# Patient Record
Sex: MALE | Race: WHITE | NOT HISPANIC OR LATINO | Employment: OTHER | URBAN - METROPOLITAN AREA
[De-identification: names, ages, dates, MRNs, and addresses within clinical notes are randomized per-mention and may not be internally consistent; named-entity substitution may affect disease eponyms.]

---

## 2017-01-09 ENCOUNTER — OFFICE VISIT (OUTPATIENT)
Dept: FAMILY MEDICINE | Facility: CLINIC | Age: 62
End: 2017-01-09
Payer: COMMERCIAL

## 2017-01-09 VITALS
WEIGHT: 152 LBS | HEART RATE: 71 BPM | TEMPERATURE: 96.6 F | HEIGHT: 69 IN | DIASTOLIC BLOOD PRESSURE: 85 MMHG | BODY MASS INDEX: 22.51 KG/M2 | SYSTOLIC BLOOD PRESSURE: 145 MMHG | OXYGEN SATURATION: 95 %

## 2017-01-09 DIAGNOSIS — R35.1 NOCTURIA: ICD-10-CM

## 2017-01-09 DIAGNOSIS — R73.9 ELEVATED BLOOD SUGAR: ICD-10-CM

## 2017-01-09 DIAGNOSIS — E06.3 CHRONIC LYMPHOCYTIC THYROIDITIS: ICD-10-CM

## 2017-01-09 DIAGNOSIS — Z13.6 CARDIOVASCULAR SCREENING; LDL GOAL LESS THAN 160: ICD-10-CM

## 2017-01-09 DIAGNOSIS — E06.3 HYPOTHYROIDISM DUE TO HASHIMOTO'S THYROIDITIS: ICD-10-CM

## 2017-01-09 DIAGNOSIS — Z11.59 NEED FOR HEPATITIS C SCREENING TEST: ICD-10-CM

## 2017-01-09 DIAGNOSIS — M72.0 DUPUYTREN'S CONTRACTURE OF BOTH HANDS: ICD-10-CM

## 2017-01-09 DIAGNOSIS — E06.3 HYPOTHYROIDISM DUE TO HASHIMOTO'S THYROIDITIS: Primary | ICD-10-CM

## 2017-01-09 DIAGNOSIS — Z00.00 ROUTINE HISTORY AND PHYSICAL EXAMINATION OF ADULT: Primary | ICD-10-CM

## 2017-01-09 LAB
ALBUMIN SERPL-MCNC: 3.9 G/DL (ref 3.4–5)
ALBUMIN UR-MCNC: NEGATIVE MG/DL
ANION GAP SERPL CALCULATED.3IONS-SCNC: 9 MMOL/L (ref 3–14)
APPEARANCE UR: CLEAR
BILIRUB UR QL STRIP: NEGATIVE
BUN SERPL-MCNC: 17 MG/DL (ref 7–30)
CALCIUM SERPL-MCNC: 9.4 MG/DL (ref 8.5–10.1)
CHLORIDE SERPL-SCNC: 103 MMOL/L (ref 94–109)
CHOLEST SERPL-MCNC: 207 MG/DL
CO2 SERPL-SCNC: 29 MMOL/L (ref 20–32)
COLOR UR AUTO: YELLOW
CREAT SERPL-MCNC: 1.07 MG/DL (ref 0.66–1.25)
CREAT UR-MCNC: 186 MG/DL
ERYTHROCYTE [DISTWIDTH] IN BLOOD BY AUTOMATED COUNT: 13.5 % (ref 10–15)
GFR SERPL CREATININE-BSD FRML MDRD: 70 ML/MIN/1.7M2
GLUCOSE SERPL-MCNC: 137 MG/DL (ref 70–99)
GLUCOSE UR STRIP-MCNC: NEGATIVE MG/DL
HBA1C MFR BLD: 5.9 % (ref 4.3–6)
HCT VFR BLD AUTO: 48 % (ref 40–53)
HCV AB SERPL QL IA: NORMAL
HDLC SERPL-MCNC: 63 MG/DL
HGB BLD-MCNC: 16 G/DL (ref 13.3–17.7)
HGB UR QL STRIP: ABNORMAL
KETONES UR STRIP-MCNC: NEGATIVE MG/DL
LDLC SERPL CALC-MCNC: 128 MG/DL
LEUKOCYTE ESTERASE UR QL STRIP: NEGATIVE
MCH RBC QN AUTO: 31.5 PG (ref 26.5–33)
MCHC RBC AUTO-ENTMCNC: 33.3 G/DL (ref 31.5–36.5)
MCV RBC AUTO: 95 FL (ref 78–100)
MICROALBUMIN UR-MCNC: 7 MG/L
MICROALBUMIN/CREAT UR: 3.51 MG/G CR (ref 0–17)
NITRATE UR QL: NEGATIVE
NONHDLC SERPL-MCNC: 144 MG/DL
PH UR STRIP: 6.5 PH (ref 5–7)
PHOSPHATE SERPL-MCNC: 2.7 MG/DL (ref 2.5–4.5)
PLATELET # BLD AUTO: 220 10E9/L (ref 150–450)
POTASSIUM SERPL-SCNC: 4.3 MMOL/L (ref 3.4–5.3)
PSA SERPL-ACNC: 1.12 UG/L (ref 0–4)
RBC # BLD AUTO: 5.08 10E12/L (ref 4.4–5.9)
RBC #/AREA URNS AUTO: NORMAL /HPF (ref 0–2)
SODIUM SERPL-SCNC: 141 MMOL/L (ref 133–144)
SP GR UR STRIP: 1.01 (ref 1–1.03)
T4 FREE SERPL-MCNC: 1.18 NG/DL (ref 0.76–1.46)
TRIGL SERPL-MCNC: 78 MG/DL
TSH SERPL DL<=0.005 MIU/L-ACNC: 6.03 MU/L (ref 0.4–4)
URN SPEC COLLECT METH UR: ABNORMAL
UROBILINOGEN UR STRIP-ACNC: 0.2 EU/DL (ref 0.2–1)
WBC # BLD AUTO: 8.7 10E9/L (ref 4–11)
WBC #/AREA URNS AUTO: NORMAL /HPF (ref 0–2)

## 2017-01-09 PROCEDURE — 84439 ASSAY OF FREE THYROXINE: CPT | Performed by: FAMILY MEDICINE

## 2017-01-09 PROCEDURE — 99000 SPECIMEN HANDLING OFFICE-LAB: CPT | Performed by: FAMILY MEDICINE

## 2017-01-09 PROCEDURE — 84443 ASSAY THYROID STIM HORMONE: CPT | Performed by: FAMILY MEDICINE

## 2017-01-09 PROCEDURE — 81001 URINALYSIS AUTO W/SCOPE: CPT | Performed by: FAMILY MEDICINE

## 2017-01-09 PROCEDURE — 80069 RENAL FUNCTION PANEL: CPT | Performed by: FAMILY MEDICINE

## 2017-01-09 PROCEDURE — 82043 UR ALBUMIN QUANTITATIVE: CPT | Performed by: FAMILY MEDICINE

## 2017-01-09 PROCEDURE — 80061 LIPID PANEL: CPT | Performed by: FAMILY MEDICINE

## 2017-01-09 PROCEDURE — 83036 HEMOGLOBIN GLYCOSYLATED A1C: CPT | Performed by: FAMILY MEDICINE

## 2017-01-09 PROCEDURE — 85027 COMPLETE CBC AUTOMATED: CPT | Performed by: FAMILY MEDICINE

## 2017-01-09 PROCEDURE — 36415 COLL VENOUS BLD VENIPUNCTURE: CPT | Performed by: FAMILY MEDICINE

## 2017-01-09 PROCEDURE — G0103 PSA SCREENING: HCPCS | Performed by: FAMILY MEDICINE

## 2017-01-09 PROCEDURE — 86803 HEPATITIS C AB TEST: CPT | Mod: 90 | Performed by: FAMILY MEDICINE

## 2017-01-09 PROCEDURE — 99396 PREV VISIT EST AGE 40-64: CPT | Performed by: FAMILY MEDICINE

## 2017-01-09 RX ORDER — LEVOTHYROXINE SODIUM 88 UG/1
88 TABLET ORAL DAILY
Qty: 90 TABLET | Refills: 3 | Status: SHIPPED | OUTPATIENT
Start: 2017-01-09 | End: 2018-01-21

## 2017-01-09 ASSESSMENT — PAIN SCALES - GENERAL: PAINLEVEL: NO PAIN (0)

## 2017-01-09 NOTE — MR AVS SNAPSHOT
After Visit Summary   1/9/2017    Robert Carolina    MRN: [id number]           Patient Information     Date Of Birth          1955        Visit Information        Provider Department      1/9/2017 8:20 AM Renee Byrd MD [facility]        Today's Diagnoses     Routine history and physical examination of adult    -  1     Chronic lymphocytic thyroiditis         Hypothyroidism due to Hashimoto's thyroiditis         Need for hepatitis C screening test         CARDIOVASCULAR SCREENING; LDL GOAL LESS THAN 160         Elevated blood sugar         Nocturia           Care Instructions      Preventive Health Recommendations  Male Ages 50 - 64    Yearly exam:             See your health care provider every year in order to  o   Review health changes.   o   Discuss preventive care.    o   Review your medicines if your doctor has prescribed any.     Have a cholesterol test every 5 years, or more frequently if you are at risk for high cholesterol/heart disease.     Have a diabetes test (fasting glucose) every three years. If you are at risk for diabetes, you should have this test more often.     Have a colonoscopy at age 50, or have a yearly FIT test (stool test). These exams will check for colon cancer.      Talk with your health care provider about whether or not a prostate cancer screening test (PSA) is right for you.    You should be tested each year for STDs (sexually transmitted diseases), if you re at risk.     Shots: Get a flu shot each year. Get a tetanus shot every 10 years.     Nutrition:    Eat at least 5 servings of fruits and vegetables daily.     Eat whole-grain bread, whole-wheat pasta and brown rice instead of white grains and rice.     Talk to your provider about Calcium and Vitamin D.     Lifestyle    Exercise for at least 150 minutes a week (30 minutes a day, 5 days a week). This will help you control your weight and prevent disease.     Limit alcohol to one  drink per day.     No smoking.     Wear sunscreen to prevent skin cancer.     See your dentist every six months for an exam and cleaning.     See your eye doctor every 1 to 2 years.      How to contact your care team: [phone] Pharmacy [phone]   EDENILSON MOTA MD KATYA GEORGIEV, PA-C CHRIS JONES, PA-C NAM HO, MD JONATHAN BATES, MD ARVIN VOCAL, MD    Clinic hours M-Th 7am-7pm Fri 7am-5pm.   Urgent care M-F 11am-9pm  Sat/Sun 9am-5pm.   Pharmacy   Mon-Th:  8:00am-8pm   Fri:  8:00am-6:00pm  Sat/Sun  8:00am-5:00 pm       Hypothyroidism       You have hypothyroidism. This means your thyroid gland is not making enough thyroid hormone. This hormone is vital to body growth and metabolism. If you don t make enough, many body processes slow down. This can cause symptoms throughout the body. Hypothyroidism can range from mild to severe. The most severe form is called myxedema.  There are a number of causes of hypothyroidism. A common cause is Hashimoto s disease. This disease causes the body s own immune system to attack the thyroid gland. When you have certain treatments, such as surgery to remove the thyroid gland, this can also cause hypothyroidism.  Symptoms of hypothyroidism can include:    Fatigue    Trouble concentrating or thinking clearly; forgetfulness    Dry skin    Hair loss    Weight gain    Low tolerance to cold    Constipation    Depression    Personality changes    Tingling or prickling of the hands or feet    Heavy, absent, or irregular periods (women only)  Older adults may sometimes have other symptoms. These can include:    Muscle aches and weakness    Confusion    Incontinence (unable to control urine or stool)    Trouble moving around    Falling  Treatment for hypothyroidism involves taking thyroid hormone pills daily. These pills replace the hormone your thyroid doesn t make. You will likely need to take a daily pill for the rest of your life. Tips for taking this  medicine are given below.  Home care  Tips for taking your medicine    Take your thyroid hormone pills as prescribed by your healthcare provider. This is most often 1 pill a day on an empty stomach. Use a pillbox labeled with the days of the week. This will help you remember to take your pill each day.    Don t take products that contain iron and calcium or antacids within 4 hours of taking your thyroid hormone pills.    Don t take other medicines with your thyroid hormone pill without checking with your provider first.    Tell your provider if you have any side effects from your medicines that bother you.    Never change the dosage or stop taking your thyroid pills without talking to your provider first.  General care    Always talk with your provider before trying other medicines or treatments for your thyroid problem.    If you see other healthcare providers, be sure to let them know about your thyroid problem.  Follow-up care  See your healthcare provider for checkups as advised. You may need regular tests to check the level of thyroid hormone in your blood.  When to seek medical advice  Call your healthcare provider right away if any of these occur:    New symptoms develop    Symptoms return, continue, or worsen even after treatment    Extreme fatigue    Puffy hands, face, or feet    Fast or irregular heartbeat    Confusion  Call 911  Call 911 right away if any of these occur:    Fainting    Chest pain    Shortness of breath or trouble breathing    [id number] [employer]. [address] [place] [address]. All rights reserved. This information is not intended as a substitute for professional medical care. Always follow your healthcare professional's instructions.              Follow-ups after your visit        Your next 10 appointments already scheduled     Aug 22, 2017  1:30 PM   [facility]     "[address] [facility] [address]   [phone]            Dec 15, 2017 10:45 AM   Return Visit with Alisha Blackwood MD   [facility]    [address] [place] [address] [phone]              Who to contact     If you have questions or need follow up information about today's clinic visit or your schedule please contact [facility] directly at [phone].  Normal or non-critical lab and imaging results will be communicated to you by Wordseyehart, letter or phone within 4 business days after the clinic has received the results. If you do not hear from us within 7 days, please contact the clinic through Audiamt or phone. If you have a critical or abnormal lab result, we will notify you by phone as soon as possible.  Submit refill requests through [employer] or call your pharmacy and they will forward the refill request to us. Please allow 3 business days for your refill to be completed.          Additional Information About Your Visit        MyChart Information     [employer] gives you secure access to your electronic health record. If you see a primary care provider, you can also send messages to your care team and make appointments. If you have questions, please call your primary care clinic.  If you do not have a primary care provider, please call [phone] and they will assist you.        Care EveryWhere ID     This is your Care EveryWhere ID. This could be used by other organizations to access your [place] medical records  [id number]        Your Vitals Were     Pulse Temperature Height BMI (Body Mass Index) Pulse Oximetry       71 96.6  F (35.9  C) (Oral) 5' 9.25\" (1.759 m) 22.28 kg/m2 95%        Blood Pressure from Last 3 Encounters:   01/09/17 145/85   01/11/16 146/82   11/23/15 135/86    Weight from Last 3 Encounters:   01/09/17 152 lb (68.947 kg)   11/22/16 150 lb (68.04 kg)   05/24/16 155 lb (70.308 kg)    "           We Performed the Following     Albumin Random Urine Quantitative     CBC with platelets     Hemoglobin A1c     Hepatitis C Screen Reflex to HCV RNA Quant and Genotype     Lipid panel reflex to direct LDL     PSA, screen     Renal panel     TSH with free T4 reflex     UA reflex to Microscopic and Culture          Where to get your medicines      These medications were sent to [id number]  [place] - [address] AT [facility] [address]  [address] [place] [address]     Phone:  [phone]    - levothyroxine 88 MCG tablet       Primary Care Provider Office Phone # Fax #    Renee Neeta Byrd [phone]       [facility] [address]  [facility] [place] [address]        Thank you!     Thank you for choosing [facility]  for your care. Our goal is always to provide you with excellent care. Hearing back from our patients is one way we can continue to improve our services. Please take a few minutes to complete the written survey that you may receive in the mail after your visit with us. Thank you!             Your Updated Medication List - Protect others around you: Learn how to safely use, store and throw away your medicines at www.disposemymeds.org.          This list is accurate as of: 1/9/17  8:50 AM.  Always use your most recent med list.                   Brand Name Dispense Instructions for use    ALAVERT PO      takes about 1-2 times per week as needed       BABY ASPIRIN PO          COMPOUND - PHARMACY TO MIX COMPOUNDED MEDICATION    CMPD RX     Spray in nostril 2 times daily Gent/Dex/Itra Nasal Irrigation       levothyroxine 88 MCG tablet    SYNTHROID/LEVOTHROID    90 tablet    Take 1 tablet (88 mcg) by mouth daily       OCUVITE PO      once daily       PEPTO-BISMOL PO      Take  by mouth as needed.       TYLENOL 325 MG tablet   Generic drug:  acetaminophen      as needed       ZANTAC 150 MG tablet   Generic  drug:  ranitidine      Take  by mouth. As neded.

## 2017-01-09 NOTE — NURSING NOTE
"Chief Complaint   Patient presents with     Physical     Fasting       Initial /85 mmHg  Pulse 71  Temp(Src) 96.6  F (35.9  C) (Oral)  Ht 5' 9.25\" (1.759 m)  Wt 152 lb (68.947 kg)  BMI 22.28 kg/m2  SpO2 95% Estimated body mass index is 22.28 kg/(m^2) as calculated from the following:    Height as of this encounter: 5' 9.25\" (1.759 m).    Weight as of this encounter: 152 lb (68.947 kg).  BP completed using cuff size: hermila Solo CMA    "

## 2017-01-09 NOTE — PROGRESS NOTES
SUBJECTIVE:     CC: Robert Carolina is an 61 year old male who presents for preventative health visit.     Healthy Habits:    Do you get at least three servings of calcium containing foods daily (dairy, green leafy vegetables, etc.)? yes    Amount of exercise or daily activities, outside of work: Active at part-time work    Problems taking medications regularly No    Medication side effects: No    Have you had an eye exam in the past two years? yes    Do you see a dentist twice per year? yes    Do you have sleep apnea, excessive snoring or daytime drowsiness? yes currently has sleep apnea        Hypothyroidism Follow-up      Since last visit, patient describes the following symptoms: Weight stable, no hair loss, no skin changes, no constipation, no loose stools       Today's PHQ-2 Score:   PHQ-2 ( 1999 [employer]) 1/9/2017 1/11/2016   Q1: Little interest or pleasure in doing things 0 0   Q2: Feeling down, depressed or hopeless 0 0   PHQ-2 Score 0 0       Abuse: Current or Past(Physical, Sexual or Emotional)- No  Do you feel safe in your environment - Yes    Social History   Substance Use Topics     Smoking status: Never Smoker      Smokeless tobacco: Never Used     Alcohol Use: Yes      Comment: 1-2 times a month     The patient does not drink >3 drinks per day nor >7 drinks per week.    Last PSA:   PSA   Date Value Ref Range Status   11/02/2014 1.36 0 - 4 ug/L Final       Recent Labs   Lab Test  08/21/15   0910  11/02/14   0949   CHOL  179  175   HDL  50  59   LDL  115  100   TRIG  70  81   CHOLHDLRATIO  3.6  3.0       Reviewed orders with patient. Reviewed health maintenance and updated orders accordingly - Yes    All Histories reviewed and updated in Epic.      ROS:  C: NEGATIVE for fever, chills, change in weight  I: NEGATIVE for worrisome rashes, moles or lesions  E: NEGATIVE for vision changes or irritation  ENT: NEGATIVE for ear, mouth and throat problems  R: NEGATIVE for significant cough or SOB  CV: NEGATIVE  for chest pain, palpitations or peripheral edema  GI: NEGATIVE for nausea, abdominal pain, heartburn, or change in bowel habits   male: negative for dysuria, hematuria, decreased urinary stream, erectile dysfunction, urethral discharge   male: decreased urinary stream and nocturia x 2  M: NEGATIVE for significant arthralgias or myalgia  N: NEGATIVE for weakness, dizziness or paresthesias  P: NEGATIVE for changes in mood or affect    Problem list, Medication list, Allergies, and Medical/Social/Surgical histories reviewed in [facility] and updated as appropriate.  Labs reviewed in EPIC  BP Readings from Last 3 Encounters:   01/09/17 145/85   01/11/16 146/82   11/23/15 135/86    Wt Readings from Last 3 Encounters:   01/09/17 152 lb (68.947 kg)   11/22/16 150 lb (68.04 kg)   05/24/16 155 lb (70.308 kg)                  Patient Active Problem List   Diagnosis     CARDIOVASCULAR SCREENING; LDL GOAL LESS THAN 160     Nasal polyposis     Chronic sinusitis     Advanced directives, counseling/discussion     SCOTT (obstructive sleep apnea)-Mild (AHI 6)     Hashimoto's thyroiditis     Nevus of multiple sites of trunk     Hypothyroidism due to Hashimoto's thyroiditis     Elevated blood sugar     Nocturia     Past Surgical History   Procedure Laterality Date     Arthroscopy knee rt/lt       LT - Meniscus injury, Dr. Miguel LITTLEJOHN nonspecific procedure       Stapled Stomach - due to ulcer     C oral surgery procedure       [place] teeth     Optical tracking system endoscopic sinus surgery  7/21/2011     Procedure:OPTICAL TRACKING SYSTEM ENDOSCOPIC SINUS SURGERY; image guided endoscopic septoplasty, bilateral ethmoidectomy, bilateral maxillary antrostomy, polypectomy,bilateral sphenoid sinusotomies, bilateral frontal sinusotomies; Surgeon:PAT HOLDER; Location: OR     Sinus surgery  2009       Social History   Substance Use Topics     Smoking status: Never Smoker      Smokeless tobacco: Never Used     Alcohol Use: Yes       "Comment: 1-2 times a month     Family History   Problem Relation Age of Onset     DIABETES Mother          Current Outpatient Prescriptions   Medication Sig Dispense Refill     levothyroxine (SYNTHROID/LEVOTHROID) 88 MCG tablet Take 1 tablet (88 mcg) by mouth daily 90 tablet 3     COMPOUND (CMPD RX) - PHARMACY TO MIX COMPOUNDED MEDICATION Spray in nostril 2 times daily Gent/Dex/Itra Nasal Irrigation       BABY ASPIRIN PO        Bismuth Subsalicylate (PEPTO-BISMOL PO) Take  by mouth as needed.       ZANTAC 150 MG PO TABS Take  by mouth. As neded.        ALAVERT OR takes about 1-2 times per week as needed       OCUVITE OR once daily       TYLENOL 325 MG PO TABS as needed       [DISCONTINUED] levothyroxine (SYNTHROID, LEVOTHROID) 88 MCG tablet Take 1 tablet (88 mcg) by mouth daily 90 tablet 3     Allergies   Allergen Reactions     Cefuroxime      Other reaction(s): Stomach Upset     Ceftin GI Disturbance     Oxycodone-Acetaminophen Rash     Recent Labs   Lab Test  01/11/16   1449  08/21/15   0910  11/02/14   0949   11/01/12   1716  06/14/09   1004   A1C  6.3*   --    --    --    --    --    LDL   --   115  100   --    --   111   HDL   --   50  59   --    --   41   TRIG   --   70  81   --    --   58   ALT   --    --    --    --   32   --    CR  0.92   --    --    --   0.79   --    GFRESTIMATED  83   --    --    --   >90   --    GFRESTBLACK  >90   GFR Calc     --    --    --   >90   --    POTASSIUM  4.2   --    --    --   4.0   --    TSH  3.33   --   3.09   < >  18.50*   --     < > = values in this interval not displayed.      OBJECTIVE:     /85 mmHg  Pulse 71  Temp(Src) 96.6  F (35.9  C) (Oral)  Ht 5' 9.25\" (1.759 m)  Wt 152 lb (68.947 kg)  BMI 22.28 kg/m2  SpO2 95%  EXAM:  GENERAL APPEARANCE: healthy, alert and no distress  EYES: Eyes grossly normal to inspection, PERRL and conjunctivae and sclerae normal  HENT: ear canals and TM's normal, nose and mouth without ulcers or lesions, oropharynx " clear and oral mucous membranes moist  NECK: no adenopathy, no asymmetry, masses, or scars and thyroid normal to palpation  RESP: lungs clear to auscultation - no rales, rhonchi or wheezes  CV: regular rates and rhythm, normal S1 S2, no S3 or S4, no murmur, click or rub, no peripheral edema and peripheral pulses strong  ABDOMEN: soft, nontender, no hepatosplenomegaly, no masses and bowel sounds normal  MS: no musculoskeletal defects are noted and gait is age appropriate without ataxia, heberden's nodes both hands with dupuytren's contractions with nodules both hands middle finger flexor tendons.  SKIN: no suspicious lesions or rashes  NEURO: Normal strength and tone, sensory exam grossly normal, mentation intact and speech normal  PSYCH: mentation appears normal and affect normal/bright     ASSESSMENT/PLAN:         ICD-10-CM    1. Routine history and physical examination of adult Z00.00 Chronic stable problems   2. Chronic lymphocytic thyroiditis E06.3 levothyroxine (SYNTHROID/LEVOTHROID) 88 MCG tablet   3. Hypothyroidism due to Hashimoto's thyroiditis E03.8 TSH with free T4 reflex    E06.3 CBC with platelets   4. Need for hepatitis C screening test Z11.59 Hepatitis C Screen Reflex to HCV RNA Quant and Genotype   5. CARDIOVASCULAR SCREENING; LDL GOAL LESS THAN 160 Z13.6 Lipid panel reflex to direct LDL   6. Elevated blood sugar R73.9 Hemoglobin A1c   7. Nocturia- referral to urology if symptoms worsen or treatment desired.  R35.1 Renal panel     UA reflex to Microscopic and Culture     Albumin Random Urine Quantitative     PSA, screen   8. Dupuytren's contracture of both hands M72.0 Stable and able to function without problems at this time.        COUNSELING:  Reviewed preventive health counseling, as reflected in patient instructions       Regular exercise       Healthy diet/nutrition       Vision screening       Hearing screening    BP Screening:   Last 3 BP Readings:    BP Readings from Last 3 Encounters:  "  01/09/17 145/85   01/11/16 146/82   11/23/15 135/86       The following was recommended to the patient:  Re-screen BP within a year and recommended lifestyle modifications       reports that he has never smoked. He has never used smokeless tobacco.    Estimated body mass index is 22.28 kg/(m^2) as calculated from the following:    Height as of this encounter: 5' 9.25\" (1.759 m).    Weight as of this encounter: 152 lb (68.947 kg).       Counseling Resources:  ATP IV Guidelines  Pooled Cohorts Equation Calculator  FRAX Risk Assessment  ICSI Preventive Guidelines  Dietary Guidelines for Americans, 2010  USDA's MyPlate  ASA Prophylaxis  Lung CA Screening    Renee Byrd MD  [facility]  "

## 2017-01-09 NOTE — Clinical Note
[facility]  [address] [place] [address]  [phone]      January 10, 2017      Robert Carolina  [address] [place] [address]              Dear Robert,      Your test results are attached. I am happy to let you know that they are stable and your medications can stay the same.    The blood sugar is normal and you do not have diabetes. The kidneys are healthy. The test for prostate cancer was normal and shows low risk.     The TSH is a little high but the Free T4 is normal. This is a borderline result and should be rechecked in 3 months. I will put in a future lab order for the thyroid and you can make a lab only appointment for the test.     Please call me if you have any questions about these test results or about your care.      Sincerely,      Renee Byrd MD      Results for orders placed or performed in visit on 01/09/17   Hepatitis C Screen Reflex to HCV RNA Quant and Genotype   Result Value Ref Range    Hepatitis C Antibody  NR     Nonreactive   Assay performance characteristics have not been established for newborns,   infants, and children     Renal panel   Result Value Ref Range    Sodium 141 133 - 144 mmol/L    Potassium 4.3 3.4 - 5.3 mmol/L    Chloride 103 94 - 109 mmol/L    Carbon Dioxide 29 20 - 32 mmol/L    Anion Gap 9 3 - 14 mmol/L    Glucose 137 (H) 70 - 99 mg/dL    Urea Nitrogen 17 7 - 30 mg/dL    Creatinine 1.07 0.66 - 1.25 mg/dL    GFR Estimate 70 >60 mL/min/1.7m2    GFR Estimate If Black 85 >60 mL/min/1.7m2    Calcium 9.4 8.5 - 10.1 mg/dL    Phosphorus 2.7 2.5 - 4.5 mg/dL    Albumin 3.9 3.4 - 5.0 g/dL   TSH with free T4 reflex   Result Value Ref Range    TSH 6.03 (H) 0.40 - 4.00 mU/L   Lipid panel reflex to direct LDL   Result Value Ref Range    Cholesterol 207 (H) <200 mg/dL    Triglycerides 78 <150 mg/dL    HDL Cholesterol 63 >39 mg/dL    LDL Cholesterol Calculated 128 (H) <100 mg/dL    Non HDL Cholesterol 144 (H) <130 mg/dL   Hemoglobin  A1c   Result Value Ref Range    Hemoglobin A1C 5.9 4.3 - 6.0 %   UA reflex to Microscopic and Culture   Result Value Ref Range    Color Urine Yellow     Appearance Urine Clear     Glucose Urine Negative NEG mg/dL    Bilirubin Urine Negative NEG    Ketones Urine Negative NEG mg/dL    Specific Gravity Urine 1.010 1.003 - 1.035    Blood Urine Trace (A) NEG    pH Urine 6.5 5.0 - 7.0 pH    Protein Albumin Urine Negative NEG mg/dL    Urobilinogen Urine 0.2 0.2 - 1.0 EU/dL    Nitrite Urine Negative NEG    Leukocyte Esterase Urine Negative NEG    Source Midstream Urine    Albumin Random Urine Quantitative   Result Value Ref Range    Creatinine Urine 186 mg/dL    Albumin Urine mg/L 7 mg/L    Albumin Urine mg/g Cr 3.51 0 - 17 mg/g Cr   CBC with platelets   Result Value Ref Range    WBC 8.7 4.0 - 11.0 10e9/L    RBC Count 5.08 4.4 - 5.9 10e12/L    Hemoglobin 16.0 13.3 - 17.7 g/dL    Hematocrit 48.0 40.0 - 53.0 %    MCV 95 78 - 100 fl    MCH 31.5 26.5 - 33.0 pg    MCHC 33.3 31.5 - 36.5 g/dL    RDW 13.5 10.0 - 15.0 %    Platelet Count 220 150 - 450 10e9/L   PSA, screen   Result Value Ref Range    PSA 1.12 0 - 4 ug/L   Urine Microscopic   Result Value Ref Range    WBC Urine O - 2 0 - 2 /HPF    RBC Urine O - 2 0 - 2 /HPF   T4 free   Result Value Ref Range    T4 Free 1.18 0.76 - 1.46 ng/dL

## 2017-01-09 NOTE — PATIENT INSTRUCTIONS
Preventive Health Recommendations  Male Ages 50 - 64    Yearly exam:             See your health care provider every year in order to  o   Review health changes.   o   Discuss preventive care.    o   Review your medicines if your doctor has prescribed any.     Have a cholesterol test every 5 years, or more frequently if you are at risk for high cholesterol/heart disease.     Have a diabetes test (fasting glucose) every three years. If you are at risk for diabetes, you should have this test more often.     Have a colonoscopy at age 50, or have a yearly FIT test (stool test). These exams will check for colon cancer.      Talk with your health care provider about whether or not a prostate cancer screening test (PSA) is right for you.    You should be tested each year for STDs (sexually transmitted diseases), if you re at risk.     Shots: Get a flu shot each year. Get a tetanus shot every 10 years.     Nutrition:    Eat at least 5 servings of fruits and vegetables daily.     Eat whole-grain bread, whole-wheat pasta and brown rice instead of white grains and rice.     Talk to your provider about Calcium and Vitamin D.     Lifestyle    Exercise for at least 150 minutes a week (30 minutes a day, 5 days a week). This will help you control your weight and prevent disease.     Limit alcohol to one drink per day.     No smoking.     Wear sunscreen to prevent skin cancer.     See your dentist every six months for an exam and cleaning.     See your eye doctor every 1 to 2 years.      How to contact your care team: [phone] Pharmacy [phone]   EDENILSON MOTA MD KATYA GEORGIEV, PA-C CHRIS JONES, PA-C NAM HO, MD JONATHAN BATES, MD ARVIN VOCAL, MD    Clinic hours M-Th 7am-7pm Fri 7am-5pm.   Urgent care M-F 11am-9pm  Sat/Sun 9am-5pm.   Pharmacy   Mon-Th:  8:00am-8pm   Fri:  8:00am-6:00pm  Sat/Sun  8:00am-5:00 pm       Hypothyroidism       You have hypothyroidism. This means your thyroid gland  is not making enough thyroid hormone. This hormone is vital to body growth and metabolism. If you don t make enough, many body processes slow down. This can cause symptoms throughout the body. Hypothyroidism can range from mild to severe. The most severe form is called myxedema.  There are a number of causes of hypothyroidism. A common cause is Hashimoto s disease. This disease causes the body s own immune system to attack the thyroid gland. When you have certain treatments, such as surgery to remove the thyroid gland, this can also cause hypothyroidism.  Symptoms of hypothyroidism can include:    Fatigue    Trouble concentrating or thinking clearly; forgetfulness    Dry skin    Hair loss    Weight gain    Low tolerance to cold    Constipation    Depression    Personality changes    Tingling or prickling of the hands or feet    Heavy, absent, or irregular periods (women only)  Older adults may sometimes have other symptoms. These can include:    Muscle aches and weakness    Confusion    Incontinence (unable to control urine or stool)    Trouble moving around    Falling  Treatment for hypothyroidism involves taking thyroid hormone pills daily. These pills replace the hormone your thyroid doesn t make. You will likely need to take a daily pill for the rest of your life. Tips for taking this medicine are given below.  Home care  Tips for taking your medicine    Take your thyroid hormone pills as prescribed by your healthcare provider. This is most often 1 pill a day on an empty stomach. Use a pillbox labeled with the days of the week. This will help you remember to take your pill each day.    Don t take products that contain iron and calcium or antacids within 4 hours of taking your thyroid hormone pills.    Don t take other medicines with your thyroid hormone pill without checking with your provider first.    Tell your provider if you have any side effects from your medicines that bother you.    Never change the  dosage or stop taking your thyroid pills without talking to your provider first.  General care    Always talk with your provider before trying other medicines or treatments for your thyroid problem.    If you see other healthcare providers, be sure to let them know about your thyroid problem.  Follow-up care  See your healthcare provider for checkups as advised. You may need regular tests to check the level of thyroid hormone in your blood.  When to seek medical advice  Call your healthcare provider right away if any of these occur:    New symptoms develop    Symptoms return, continue, or worsen even after treatment    Extreme fatigue    Puffy hands, face, or feet    Fast or irregular heartbeat    Confusion  Call 911  Call 911 right away if any of these occur:    Fainting    Chest pain    Shortness of breath or trouble breathing    [id number] [employer]. [address] [place] [address], [place] [address]. All rights reserved. This information is not intended as a substitute for professional medical care. Always follow your healthcare professional's instructions.

## 2017-01-10 NOTE — PROGRESS NOTES
Quick Note:    Dear Robert Carolina,    Your test results are attached. I am happy to let you know that they are stable and your medications can stay the same.    The blood sugar is normal and you do not have diabetes. The kidneys are healthy. The test for prostate cancer was normal and shows low risk. The TSH is a little high but the Free T4 is normal. This is a borderline result and should be rechecked in 3 months. I will put in a future lab order for the thyroid and you can make a lab only appointment for the test.     Please call me if you have any questions about these test results or about your care.    Sincerely,    Renee Byrd MD  ______

## 2017-08-22 ENCOUNTER — OFFICE VISIT (OUTPATIENT)
Dept: OTOLARYNGOLOGY | Facility: CLINIC | Age: 62
End: 2017-08-22
Payer: COMMERCIAL

## 2017-08-22 DIAGNOSIS — R05.3 CHRONIC COUGH: ICD-10-CM

## 2017-08-22 DIAGNOSIS — J32.4 CHRONIC PANSINUSITIS: Primary | ICD-10-CM

## 2017-08-22 DIAGNOSIS — J33.9 NASAL POLYPOSIS: ICD-10-CM

## 2017-08-22 PROCEDURE — 99214 OFFICE O/P EST MOD 30 MIN: CPT | Mod: 25 | Performed by: OTOLARYNGOLOGY

## 2017-08-22 PROCEDURE — 31231 NASAL ENDOSCOPY DX: CPT | Performed by: OTOLARYNGOLOGY

## 2017-08-22 RX ORDER — OMEPRAZOLE 40 MG/1
40 CAPSULE, DELAYED RELEASE ORAL DAILY
Qty: 90 CAPSULE | Refills: 1 | Status: SHIPPED | OUTPATIENT
Start: 2017-08-22 | End: 2018-01-18

## 2017-08-22 NOTE — MR AVS SNAPSHOT
"              After Visit Summary   8/22/2017    Robert Carolina    MRN: [id number]           Patient Information     Date Of Birth          1955        Visit Information        Provider Department      8/22/2017 1:30 PM Colton Wood MD [facility]        Today's Diagnoses     Chronic pansinusitis    -  1    Nasal polyposis        Chronic cough           Follow-ups after your visit        Your next 10 appointments already scheduled     Dec 15, 2017 10:45 AM CST   [facility]    [address] [place] [id number] [phone]            Dec 19, 2017  1:30 PM CST   [facility] Visit with Colton Wood MD   [facility]    [address] [facility] [address] [phone]              Who to contact     If you have questions or need follow up information about today's clinic visit or your schedule please contact [facility] directly at [phone].  Normal or non-critical lab and imaging results will be communicated to you by CrimeReportshart, letter or phone within 4 business days after the clinic has received the results. If you do not hear from us within 7 days, please contact the clinic through CrimeReportshart or phone. If you have a critical or abnormal lab result, we will notify you by phone as soon as possible.  Submit refill requests through Therapydia or call your pharmacy and they will forward the refill request to us. Please allow 3 business days for your refill to be completed.          Additional Information About Your Visit        CrimeReportshart Information     Therapydia lets you send messages to your doctor, view your test results, renew your prescriptions, schedule appointments and more. To sign up, go to [email or web address] . Click on \"Log in\" on the left side of the screen, which will take you to the " "Welcome page. Then click on \"Sign up Now\" on the right side of the page.     You will be asked to enter the access code listed below, as well as some personal information. Please follow the directions to create your username and password.     Your access code is: [email or web address]  Expires: 2017  2:11 PM     Your access code will  in 90 days. If you need help or a new code, please call your [facility] clinic or [phone].        Care EveryWhere ID     This is your Care EveryWhere ID. This could be used by other organizations to access your [facility] medical records  [id number]         Blood Pressure from Last 3 Encounters:   17 145/85   16 146/82   11/23/15 135/86    Weight from Last 3 Encounters:   17 68.9 kg (152 lb)   16 68 kg (150 lb)   16 70.3 kg (155 lb)              We Performed the Following     NASAL ENDOSCOPY, DIAGNOSTIC          Today's Medication Changes          These changes are accurate as of: 17  2:11 PM.  If you have any questions, ask your nurse or doctor.               Start taking these medicines.        Dose/Directions    omeprazole 40 MG capsule   Commonly known as:  priLOSEC   Used for:  Chronic cough, Chronic pansinusitis, Nasal polyposis        Dose:  40 mg   Take 1 capsule (40 mg) by mouth daily Take 30-60 minutes before a meal.   Quantity:  90 capsule   Refills:  1            Where to get your medicines      These medications were sent to [facility] [address] [place] [address] [address] AT [facility] [address]  [address] [address] [place] [address]     Phone:  [phone]     omeprazole 40 MG capsule                Primary Care Provider Office Phone # Fax #    Renee Byrd [phone] [address]  [place] [address]        Equal Access to Services     [facility] ROD AH: Hadii watson ku hadasho Soomaali, waaxda luqadaha, qaybta kaalmada adeegyada, january casey. " So [facility] [phone].    ATENCIÓN: Si petty alicia, tiene a montanez disposición servicios gratuitos de asistencia lingüística. Sd lyons [phone].    We comply with applicable federal civil rights laws and [place] laws. We do not discriminate on the basis of race, color, national origin, age, disability sex, sexual orientation or gender identity.            Thank you!     Thank you for choosing [facility]  for your care. Our goal is always to provide you with excellent care. Hearing back from our patients is one way we can continue to improve our services. Please take a few minutes to complete the written survey that you may receive in the mail after your visit with us. Thank you!             Your Updated Medication List - Protect others around you: Learn how to safely use, store and throw away your medicines at www.disposemymeds.org.          This list is accurate as of: 8/22/17  2:11 PM.  Always use your most recent med list.                   Brand Name Dispense Instructions for use Diagnosis    ALAVERT PO      takes about 1-2 times per week as needed        BABY ASPIRIN PO           COMPOUNDED NON-CONTROLLED SUBSTANCE - PHARMACY TO MIX COMPOUNDED MEDICATION    CMPD RX     Spray in nostril 2 times daily Gent/Dex/Itra Nasal Irrigation    Nasal polyposis       levothyroxine 88 MCG tablet    SYNTHROID/LEVOTHROID    90 tablet    Take 1 tablet (88 mcg) by mouth daily    Chronic lymphocytic thyroiditis       OCUVITE PO      once daily        omeprazole 40 MG capsule    priLOSEC    90 capsule    Take 1 capsule (40 mg) by mouth daily Take 30-60 minutes before a meal.    Chronic cough, Chronic pansinusitis, Nasal polyposis       PEPTO-BISMOL PO      Take  by mouth as needed.        TYLENOL 325 MG tablet   Generic drug:  acetaminophen      as needed        ZANTAC 150 MG tablet   Generic drug:  ranitidine      Take  by mouth. As neded.

## 2017-08-22 NOTE — PROGRESS NOTES
History of Present Illness - Robert Carolina is a 61 year old male last seen on 11/22/2016.    He is status post functinoal endoscopic sinus surgery for polyposis on 7/21/2011.    To review the past year, on exam at the 6/10/2013 visit, things looked good, with some residual disease on the LEFT.  I had him continue the rinses and at the visit on 9/16/13 visit there was still some polypoid disease on the LEFT side.    At the 12/16/13 visit things looked the best I had seen in a year, and I asked him to continue the same irrigation regimen, and also discussed adding surfactant.  He did not try the surfactant at that point, and tells me that unfortunately things had taken a slight turn for the worse at the beginning of 2014.  He could tell because he feels more congested and has a bit of a sore throat from increased post nasal drainage.  And at the 3/17/2014 visit there was clearly endoscopic evidence of purulent disease on the RIGHT side.  I added itraconazole to his Gent and Dex regimen, and also treated with 14 days of bactrim at the 3/17/14 visit.    At the 12/15/14 visit he told me that the only change he had made was adding a drop of baby shampoo to his sinus saline irrigations, and he is happy to report that he thinks it is helping.  His nose is still feeling great, much more open and clear, and there has been no facial pressure.    At the 6/23/2014 visit endoscopy showed his nose looked the best it had in years.    At 12/15/14 visit and the 4/13/15 visit, no changes, still using the rinses with baby shampoo in a saline, as well as medicated rinses 4x/Week.  Unfortunately at the 10/12/15 visit, there was a lot of purulent drainage seen on the LEFT.  SO we doubled the use of the compound irrigations and that got things under control.  So overall in 2015 and 2016, he has maintained himself on really nothing more than saline NeilMed irrigations with a bit of baby shampoo.    He tells me that things have been  steady.  Really no sinus infections, no change in any symptoms, but unfortunately no improvement in his poor sense of smell.  The other thing he brings up is that he seems to have a lot more throat clearing lately.  He does have reflux, and it is not treated.        Past Medical History -   Patient Active Problem List   Diagnosis     CARDIOVASCULAR SCREENING; LDL GOAL LESS THAN 160     Nasal polyposis     Chronic sinusitis     Advanced directives, counseling/discussion     SCOTT (obstructive sleep apnea)-Mild (AHI 6)     Hashimoto's thyroiditis     Nevus of multiple sites of trunk     Hypothyroidism due to Hashimoto's thyroiditis     Elevated blood sugar     Nocturia     Dupuytren's contracture of both hands       Current Medications -   Current Outpatient Prescriptions:      levothyroxine (SYNTHROID/LEVOTHROID) 88 MCG tablet, Take 1 tablet (88 mcg) by mouth daily, Disp: 90 tablet, Rfl: 3     COMPOUND (CMPD RX) - PHARMACY TO MIX COMPOUNDED MEDICATION, Spray in nostril 2 times daily Gent/Dex/Itra Nasal Irrigation, Disp: , Rfl:      BABY ASPIRIN PO, , Disp: , Rfl:      Bismuth Subsalicylate (PEPTO-BISMOL PO), Take  by mouth as needed., Disp: , Rfl:      ZANTAC 150 MG PO TABS, Take  by mouth. As neded. , Disp: , Rfl:      ALAVERT OR, takes about 1-2 times per week as needed, Disp: , Rfl:      OCUVITE OR, once daily, Disp: , Rfl:      TYLENOL 325 MG PO TABS, as needed, Disp: , Rfl:     Allergies -   Allergies   Allergen Reactions     Cefuroxime      Other reaction(s): Stomach Upset     Ceftin GI Disturbance     Oxycodone-Acetaminophen Rash       Social History -   Social History     Social History     Marital Status:      Spouse Name: N/A     Number of Children: N/A     Years of Education: N/A     Social History Main Topics     Smoking status: Never Smoker      Smokeless tobacco: Never Used     Alcohol Use: Yes      Comment: 1-2 times a month     Drug Use: No     Sexual Activity:     Partners: Female     Other  Topics Concern     Not on file     Social History Narrative       Family History -   Family History   Problem Relation Age of Onset     DIABETES Mother        Review of Systems - As per HPI and PMHx, otherwise 10+ system review of the head and neck, and general constitution is negative.    Physical Exam  There were no vitals taken for this visit.    General - The patient is well nourished and well developed, and appears to have good nutritional status.  Alert and oriented to person and place, answers questions and cooperates with examination appropriately.   Head and Face - Normocephalic and atraumatic, with no gross asymmetry noted of the contour of the facial features.  The facial nerve is intact, with strong symmetric movements.  Voice and Breathing - The patient was breathing comfortably without the use of accessory muscles. There was no wheezing, stridor, or stertor.  The patients voice was clear and strong, and had appropriate pitch and quality.  Ears - The tympanic membranes are normal in appearance, bony landmarks are intact.  No retraction, perforation, or masses.  Normal mobility on valsalva maneuver, with no reports of dizziness on insuflation.  No fluid or purulence was seen in the external canal or the middle ear. No evidence of infection of the middle ear or external canal, cerumen was normal in appearance.  Eyes - Extraocular movements intact, and the pupils were reactive to light.  Sclera were not icteric or injected, conjunctiva were pink and moist.  Mouth - Examination of the oral cavity showed pink, healthy oral mucosa. No lesions or ulcerations noted.  The tongue was mobile and midline, and the dentition were in good condition.    Throat - The walls of the oropharynx were smooth, pink, moist, symmetric, and had no lesions or ulcerations.  The tonsillar pillars and soft palate were symmetric.  The uvula was midline on elevation.    Neck - Normal midline excursion of the laryngotracheal complex  during swallowing.      To evaluate the nose in the postoperative state I performed rigid nasal endoscopy.  I first sprayed with lidocaine and neosynephrine.  I began with the LEFT side using a 2.7mm 30 degree rigid nasal endoscope, and color photographs were taken for the medical record.    The middle meatus was open, and I was able to pass the scope through.  Of note, I immediately noted a small 2mm polyp growing from the axilla fo the LEFT middle turbinate that was not there before.  The LEFT maxillary antrostomy is open (and of note is larger and more patent than the last visit), and looking down and into the LEFT maxillary sinus, the mucosa looks healthy, no abnormal secretions.  Going further back, the ethmoid roof is nicely re-mucosalized, and there is no abnormal secretions or polypoid degeneration.    The right side was then examined.  The middle meatus was open and I visualized the RIGHT antrostomy was open.  Looking down into the RIGHT maxillary sinus, the mucosa was flat and healthy in appearance.  Going further back the ethmoid system looks good.  The mucosa is healthy, and there were polyps or polypoid degenerations.        A/P - Robert Carolina is a 61 year old male  (J33.9) Nasal polyposis  (primary encounter diagnosis)  (J32.4) Chronic pansinusitis    I am very pleased, things look the best they have ever been, and at this point I am comfortable stretching out the follow up visits to annually. Of course, daniel can call me any time.    Continue the saline irrigations with a drop of baby shampoo.    Because of the nascent polyp found in the LEFT middle meatus, I will see him back before the end of the year for another endoscopy, to make sure it is not growing.    (R05) Chronic cough  Comment: The remainder of today's visit was spent discussing non-medical measures that can help in the management of acid reflux.  Specifically, avoidance of eating before bed, elevating the head of the bed, avoiding large  meals, minimizing fatty foods, minimal wine/beer/soda, and eating smaller meals spread out through the day.    Reflux medication will be started today with omeprazole, and we will discuss the results when he returns for the nasal endoscopy polyp follow up.  I have instructed the patient that if the symptoms are not significantly improved, we may also need to refer for an upper endoscopy, as well as changing the reflux medication, or adding a secondary medication such as sucralfate.

## 2017-10-18 ENCOUNTER — TELEPHONE (OUTPATIENT)
Dept: OTOLARYNGOLOGY | Facility: CLINIC | Age: 62
End: 2017-10-18

## 2017-10-18 DIAGNOSIS — J32.4 CHRONIC PANSINUSITIS: Primary | ICD-10-CM

## 2017-10-18 RX ORDER — CLARITHROMYCIN 500 MG
500 TABLET ORAL 2 TIMES DAILY
Qty: 20 TABLET | Refills: 0 | Status: SHIPPED | OUTPATIENT
Start: 2017-10-18 | End: 2018-01-18

## 2017-10-18 NOTE — TELEPHONE ENCOUNTER
Reason for Call:  Other call back    Detailed comments: Pt is experiencing a sinus infection. Pt stated was to call when this happened. Please call pt to advise.    Phone Number Patient can be reached at: Home number on file [phone] (home)    Best Time: any    Can we leave a detailed message on this number? YES    Call taken on 10/18/2017 at 9:16 AM by Katelynn Coon

## 2017-10-18 NOTE — TELEPHONE ENCOUNTER
Called and informed patient that his antibiotic is ready to be pickup from the pharmacy and that if he is not sensing any improvement by Monday to let us know.      Ari Orlando MA

## 2017-12-12 ENCOUNTER — OFFICE VISIT (OUTPATIENT)
Dept: OTOLARYNGOLOGY | Facility: CLINIC | Age: 62
End: 2017-12-12
Payer: COMMERCIAL

## 2017-12-12 VITALS — BODY MASS INDEX: 25.01 KG/M2 | RESPIRATION RATE: 12 BRPM | HEIGHT: 68 IN | WEIGHT: 165 LBS

## 2017-12-12 DIAGNOSIS — J32.4 CHRONIC PANSINUSITIS: ICD-10-CM

## 2017-12-12 DIAGNOSIS — J33.9 NASAL POLYPOSIS: Primary | ICD-10-CM

## 2017-12-12 DIAGNOSIS — R05.3 CHRONIC COUGH: ICD-10-CM

## 2017-12-12 PROCEDURE — 99214 OFFICE O/P EST MOD 30 MIN: CPT | Mod: 25 | Performed by: OTOLARYNGOLOGY

## 2017-12-12 PROCEDURE — 31231 NASAL ENDOSCOPY DX: CPT | Performed by: OTOLARYNGOLOGY

## 2017-12-12 RX ORDER — BUDESONIDE 0.5 MG/2ML
0.5 INHALANT ORAL DAILY
Qty: 30 AMPULE | Refills: 12 | Status: SHIPPED | OUTPATIENT
Start: 2017-12-12 | End: 2019-03-21

## 2017-12-12 NOTE — LETTER
12/12/2017         RE: Robert Carolina  [address] [place] [address]        Dear Colleague,    Thank you for referring your patient, Robert Carolina, to the [facility]. Please see a copy of my visit note below.    History of Present Illness - Robert Carolina is a 61 year old male last seen on 8/22/2017    He is status post functinoal endoscopic sinus surgery for polyposis on 7/21/2011.    To review the past year, on exam at the 6/10/2013 visit, things looked good, with some residual disease on the LEFT.  I had him continue the rinses and at the visit on 9/16/13 visit there was still some polypoid disease on the LEFT side.    At the 12/16/13 visit things looked the best I had seen in a year, and I asked him to continue the same irrigation regimen, and also discussed adding surfactant.  He did not try the surfactant at that point, and tells me that unfortunately things had taken a slight turn for the worse at the beginning of 2014.  He could tell because he feels more congested and has a bit of a sore throat from increased post nasal drainage.  And at the 3/17/2014 visit there was clearly endoscopic evidence of purulent disease on the RIGHT side.  I added itraconazole to his Gent and Dex regimen, and also treated with 14 days of bactrim at the 3/17/14 visit.    At the 12/15/14 visit he told me that the only change he had made was adding a drop of baby shampoo to his sinus saline irrigations, and he is happy to report that he thinks it is helping.  His nose is still feeling great, much more open and clear, and there has been no facial pressure.    At the 6/23/2014 visit endoscopy showed his nose looked the best it had in years.    At 12/15/14 visit and the 4/13/15 visit, no changes, still using the rinses with baby shampoo in a saline, as well as medicated rinses 4x/Week.  Unfortunately at the 10/12/15 visit, there was a lot of purulent drainage seen on the LEFT.  SO we doubled  the use of the compound irrigations and that got things under control.  So overall in 2015 and 2016, he has maintained himself on really nothing more than saline NeilMed irrigations with a bit of baby shampoo.    At the 8//22/2017 exam, I did find a small early polyp on the LEFT side.  We continued medications, but he did end up having a full blown sinusitis in October.      At the 8/22/17 visit, he also had issues with a chronic tickling dry cough.  It seemed to be laryngopharyngeal reflux< so I started reflux medication and he is also here to follow up on that issue as well.  He tells me that the reflux medicaiton didn't seem to help, so he is trying dietary modifications.        Past Medical History -   Patient Active Problem List   Diagnosis     CARDIOVASCULAR SCREENING; LDL GOAL LESS THAN 160     Nasal polyposis     Chronic sinusitis     Advanced directives, counseling/discussion     SCOTT (obstructive sleep apnea)-Mild (AHI 6)     Hashimoto's thyroiditis     Nevus of multiple sites of trunk     Hypothyroidism due to Hashimoto's thyroiditis     Elevated blood sugar     Nocturia     Dupuytren's contracture of both hands       Current Medications -   Current Outpatient Prescriptions:      clarithromycin (BIAXIN) 500 MG tablet, Take 1 tablet (500 mg) by mouth 2 times daily, Disp: 20 tablet, Rfl: 0     omeprazole (PRILOSEC) 40 MG capsule, Take 1 capsule (40 mg) by mouth daily Take 30-60 minutes before a meal., Disp: 90 capsule, Rfl: 1     levothyroxine (SYNTHROID/LEVOTHROID) 88 MCG tablet, Take 1 tablet (88 mcg) by mouth daily, Disp: 90 tablet, Rfl: 3     COMPOUND (CMPD RX) - PHARMACY TO MIX COMPOUNDED MEDICATION, Spray in nostril 2 times daily Gent/Dex/Itra Nasal Irrigation, Disp: , Rfl:      BABY ASPIRIN PO, , Disp: , Rfl:      Bismuth Subsalicylate (PEPTO-BISMOL PO), Take  by mouth as needed., Disp: , Rfl:      ZANTAC 150 MG PO TABS, Take  by mouth. As neded. , Disp: , Rfl:      ALAVERT OR, takes about 1-2 times  "per week as needed, Disp: , Rfl:      OCUVITE OR, once daily, Disp: , Rfl:      TYLENOL 325 MG PO TABS, as needed, Disp: , Rfl:     Allergies -   Allergies   Allergen Reactions     Cefuroxime      Other reaction(s): Stomach Upset     Ceftin GI Disturbance     Oxycodone-Acetaminophen Rash       Social History -   Social History     Social History     Marital Status:      Spouse Name: N/A     Number of Children: N/A     Years of Education: N/A     Social History Main Topics     Smoking status: Never Smoker      Smokeless tobacco: Never Used     Alcohol Use: Yes      Comment: 1-2 times a month     Drug Use: No     Sexual Activity:     Partners: Female     Other Topics Concern     Not on file     Social History Narrative       Family History -   Family History   Problem Relation Age of Onset     DIABETES Mother        Review of Systems - As per HPI and PMHx, otherwise 10+ system review of the head and neck, and general constitution is negative.    Physical Exam  Resp 12  Ht 1.727 m (5' 8\")  Wt 74.8 kg (165 lb)  BMI 25.09 kg/m2    General - The patient is well nourished and well developed, and appears to have good nutritional status.  Alert and oriented to person and place, answers questions and cooperates with examination appropriately.   Head and Face - Normocephalic and atraumatic, with no gross asymmetry noted of the contour of the facial features.  The facial nerve is intact, with strong symmetric movements.  Voice and Breathing - The patient was breathing comfortably without the use of accessory muscles. There was no wheezing, stridor, or stertor.  The patients voice was clear and strong, and had appropriate pitch and quality.  Ears - The tympanic membranes are normal in appearance, bony landmarks are intact.  No retraction, perforation, or masses.  Normal mobility on valsalva maneuver, with no reports of dizziness on insuflation.  No fluid or purulence was seen in the external canal or the middle ear. No " evidence of infection of the middle ear or external canal, cerumen was normal in appearance.  Eyes - Extraocular movements intact, and the pupils were reactive to light.  Sclera were not icteric or injected, conjunctiva were pink and moist.  Mouth - Examination of the oral cavity showed pink, healthy oral mucosa. No lesions or ulcerations noted.  The tongue was mobile and midline, and the dentition were in good condition.    Throat - The walls of the oropharynx were smooth, pink, moist, symmetric, and had no lesions or ulcerations.  The tonsillar pillars and soft palate were symmetric.  The uvula was midline on elevation.    Neck - Normal midline excursion of the laryngotracheal complex during swallowing.      To evaluate the nose in the postoperative state I performed rigid nasal endoscopy.  I first sprayed with lidocaine and neosynephrine.  I began with the LEFT side using a 2.7mm 30 degree rigid nasal endoscope, and color photographs were taken for the medical record.    The middle meatus was open, and I was able to pass the scope through.  Of note, I immediately noted a small 2mm polyp growing from the axilla fo the LEFT middle turbinate that was not there before.  The LEFT maxillary antrostomy is open (and of note is larger and more patent than the last visit), and looking down and into the LEFT maxillary sinus, the mucosa looks healthy, no abnormal secretions.  Going further back, the ethmoid roof is nicely re-mucosalized, and there is no abnormal secretions or polypoid degeneration.    The right side was then examined.  Of note, I immediately noted a small 2mm polyp growing from the axilla fo the RIGHT  middle turbinate that was not there before Looking down into the RIGHT maxillary sinus, the mucosa was flat and healthy in appearance.  Going further back the ethmoid system looks good.  The mucosa is healthy, and there were polyps or polypoid degenerations.        A/P - Robert Carolina is a 62 year old  male  (J33.9) Nasal polyposis  (primary encounter diagnosis)  (J32.4) Chronic pansinusitis    Unfortunately there is polyp growth on the LEFT but now also new on the RIGHT.  I am going to try budesonide rinses in saline, in addition to the Gent Dex.  Then see me back in 2 onths.    (R05) Chronic cough  Comment: The remainder of today's visit was spent reviewing non-medical measures that can help in the management of acid reflux.  Specifically, avoidance of eating before bed, elevating the head of the bed, avoiding large meals, minimizing fatty foods, minimal wine/beer/soda, and eating smaller meals spread out through the day.          Again, thank you for allowing me to participate in the care of your patient.        Sincerely,        Colton Wood MD

## 2017-12-12 NOTE — MR AVS SNAPSHOT
"              After Visit Summary   12/12/2017    Robert Carolina    MRN: [id number]           Patient Information     Date Of Birth          1955        Visit Information        Provider Department      12/12/2017 1:30 PM Colton Wood MD [facility]        Today's Diagnoses     Nasal polyposis    -  1    Chronic pansinusitis        Chronic cough           Follow-ups after your visit        Your next 10 appointments already scheduled     Dec 15, 2017 10:45 AM CST   [facility] with [facility]wood [facility]   [facility]    [id number] [place] [id number] [phone]              Who to contact     If you have questions or need follow up information about today's clinic visit or your schedule please contact [facility] directly at [phone].  Normal or non-critical lab and imaging results will be communicated to you by MyChart, letter or phone within 4 business days after the clinic has received the results. If you do not hear from us within 7 days, please contact the clinic through MyChart or phone. If you have a critical or abnormal lab result, we will notify you by phone as soon as possible.  Submit refill requests through [employer] or call your pharmacy and they will forward the refill request to us. Please allow 3 business days for your refill to be completed.          Additional Information About Your Visit        MyChart Information     [employer] lets you send messages to your doctor, view your test results, renew your prescriptions, schedule appointments and more. To sign up, go to [email or web address] . Click on \"Log in\" on the left side of the screen, which will take you to the Welcome page. Then click on \"Sign up Now\" on the right side of the page.     You will be asked to enter the access code listed below, as well as some personal information. Please follow the directions to create your " "username and password.     Your access code is: [email or web address]  Expires: 3/12/2018  1:55 PM     Your access code will  in 90 days. If you need help or a new code, please call your [facility] or [phone].        Care EveryWhere ID     This is your Care EveryWhere ID. This could be used by other organizations to access your [facility] medical records  [id number]        Your Vitals Were     Respirations Height BMI (Body Mass Index)             12 1.727 m (5' 8\") 25.09 kg/m2          Blood Pressure from Last 3 Encounters:   17 145/85   16 146/82   11/23/15 135/86    Weight from Last 3 Encounters:   17 74.8 kg (165 lb)   17 68.9 kg (152 lb)   16 68 kg (150 lb)              We Performed the Following     Nasal Endoscopy, Diag          Today's Medication Changes          These changes are accurate as of: 17  1:55 PM.  If you have any questions, ask your nurse or doctor.               Start taking these medicines.        Dose/Directions    budesonide 0.5 MG/2ML neb solution   Commonly known as:  PULMICORT   Used for:  Nasal polyposis, Chronic pansinusitis, Chronic cough   Started by:  Colton Wood MD        Dose:  0.5 mg   Spray 2 mLs (0.5 mg) in nostril daily   Quantity:  30 ampule   Refills:  12         These medicines have changed or have updated prescriptions.        Dose/Directions    COMPOUNDED NON-CONTROLLED SUBSTANCE - PHARMACY TO MIX COMPOUNDED MEDICATION   Commonly known as:  CMPD RX   This may have changed:  how much to take   Used for:  Nasal polyposis   Changed by:  Colton Wood MD        Dose:  20 mL   Spray 20 mLs in nostril 2 times daily Gent/Dex/Itra Nasal Irrigation   Quantity:  2000 mL   Refills:  11            Where to get your medicines      These medications were sent to [facility] [address] [facility]  [address] [facility] [address]     Phone:  [phone]     COMPOUNDED NON-CONTROLLED SUBSTANCE - PHARMACY TO " MIX COMPOUNDED MEDICATION         These medications were sent to [facility] [place] - [address] [place] AT [facility] [address]  [address] [place] [address]     Phone:  [phone]     budesonide 0.5 MG/2ML neb solution                Primary Care Provider Office Phone # Fax #    Renee Neeta Byrd [phone] [address] [place]  [place] [facility] [address]        Equal Access to Services     [facility]: Hadii aad ku hadasho Soomaali, waaxda luqadaha, qaybta kaalmada adeegyada, waxay idiin hayaan adeeg kharash la'aacarmelita . So [facility] [phone].    ATENCIÓN: Si habla español, tiene a montanez disposición servicios gratuitos de asistencia lingüística. [facility] [phone].    We comply with applicable federal civil rights laws and [place] laws. We do not discriminate on the basis of race, color, national origin, age, disability, sex, sexual orientation, or gender identity.            Thank you!     Thank you for choosing [facility]  for your care. Our goal is always to provide you with excellent care. Hearing back from our patients is one way we can continue to improve our services. Please take a few minutes to complete the written survey that you may receive in the mail after your visit with us. Thank you!             Your Updated Medication List - Protect others around you: Learn how to safely use, store and throw away your medicines at www.disposemymeds.org.          This list is accurate as of: 12/12/17  1:55 PM.  Always use your most recent med list.                   Brand Name Dispense Instructions for use Diagnosis    ALAVERT PO      takes about 1-2 times per week as needed        BABY ASPIRIN PO           budesonide 0.5 MG/2ML neb solution    PULMICORT    30 ampule    Spray 2 mLs (0.5 mg) in nostril daily    Nasal polyposis, Chronic pansinusitis, Chronic cough       clarithromycin 500 MG tablet    BIAXIN    20 tablet    Take 1  tablet (500 mg) by mouth 2 times daily    Chronic pansinusitis       COMPOUNDED NON-CONTROLLED SUBSTANCE - PHARMACY TO MIX COMPOUNDED MEDICATION    CMPD RX    2000 mL    Spray 20 mLs in nostril 2 times daily Gent/Dex/Itra Nasal Irrigation    Nasal polyposis       levothyroxine 88 MCG tablet    SYNTHROID/LEVOTHROID    90 tablet    Take 1 tablet (88 mcg) by mouth daily    Chronic lymphocytic thyroiditis       OCUVITE PO      once daily        omeprazole 40 MG capsule    priLOSEC    90 capsule    Take 1 capsule (40 mg) by mouth daily Take 30-60 minutes before a meal.    Chronic cough, Chronic pansinusitis, Nasal polyposis       PEPTO-BISMOL PO      Take  by mouth as needed.        TYLENOL 325 MG tablet   Generic drug:  acetaminophen      as needed        ZANTAC 150 MG tablet   Generic drug:  ranitidine      Take  by mouth. As neded.

## 2017-12-12 NOTE — NURSING NOTE
"Chief Complaint   Patient presents with     RECHECK     sinus follow up       Initial Resp 12  Ht 1.727 m (5' 8\")  Wt 74.8 kg (165 lb)  BMI 25.09 kg/m2 Estimated body mass index is 25.09 kg/(m^2) as calculated from the following:    Height as of this encounter: 1.727 m (5' 8\").    Weight as of this encounter: 74.8 kg (165 lb).  Medication Reconciliation: complete     Matt Ann CMA      "

## 2017-12-12 NOTE — PROGRESS NOTES
History of Present Illness - Robert Carolina is a 61 year old male last seen on 8/22/2017    He is status post functinoal endoscopic sinus surgery for polyposis on 7/21/2011.    To review the past year, on exam at the 6/10/2013 visit, things looked good, with some residual disease on the LEFT.  I had him continue the rinses and at the visit on 9/16/13 visit there was still some polypoid disease on the LEFT side.    At the 12/16/13 visit things looked the best I had seen in a year, and I asked him to continue the same irrigation regimen, and also discussed adding surfactant.  He did not try the surfactant at that point, and tells me that unfortunately things had taken a slight turn for the worse at the beginning of 2014.  He could tell because he feels more congested and has a bit of a sore throat from increased post nasal drainage.  And at the 3/17/2014 visit there was clearly endoscopic evidence of purulent disease on the RIGHT side.  I added itraconazole to his Gent and Dex regimen, and also treated with 14 days of bactrim at the 3/17/14 visit.    At the 12/15/14 visit he told me that the only change he had made was adding a drop of baby shampoo to his sinus saline irrigations, and he is happy to report that he thinks it is helping.  His nose is still feeling great, much more open and clear, and there has been no facial pressure.    At the 6/23/2014 visit endoscopy showed his nose looked the best it had in years.    At 12/15/14 visit and the 4/13/15 visit, no changes, still using the rinses with baby shampoo in a saline, as well as medicated rinses 4x/Week.  Unfortunately at the 10/12/15 visit, there was a lot of purulent drainage seen on the LEFT.  SO we doubled the use of the compound irrigations and that got things under control.  So overall in 2015 and 2016, he has maintained himself on really nothing more than saline NeilMed irrigations with a bit of baby shampoo.    At the 8//22/2017 exam, I did find a small  early polyp on the LEFT side.  We continued medications, but he did end up having a full blown sinusitis in October.      At the 8/22/17 visit, he also had issues with a chronic tickling dry cough.  It seemed to be laryngopharyngeal reflux< so I started reflux medication and he is also here to follow up on that issue as well.  He tells me that the reflux medicaiton didn't seem to help, so he is trying dietary modifications.        Past Medical History -   Patient Active Problem List   Diagnosis     CARDIOVASCULAR SCREENING; LDL GOAL LESS THAN 160     Nasal polyposis     Chronic sinusitis     Advanced directives, counseling/discussion     SCOTT (obstructive sleep apnea)-Mild (AHI 6)     Hashimoto's thyroiditis     Nevus of multiple sites of trunk     Hypothyroidism due to Hashimoto's thyroiditis     Elevated blood sugar     Nocturia     Dupuytren's contracture of both hands       Current Medications -   Current Outpatient Prescriptions:      clarithromycin (BIAXIN) 500 MG tablet, Take 1 tablet (500 mg) by mouth 2 times daily, Disp: 20 tablet, Rfl: 0     omeprazole (PRILOSEC) 40 MG capsule, Take 1 capsule (40 mg) by mouth daily Take 30-60 minutes before a meal., Disp: 90 capsule, Rfl: 1     levothyroxine (SYNTHROID/LEVOTHROID) 88 MCG tablet, Take 1 tablet (88 mcg) by mouth daily, Disp: 90 tablet, Rfl: 3     COMPOUND (CMPD RX) - PHARMACY TO MIX COMPOUNDED MEDICATION, Spray in nostril 2 times daily Gent/Dex/Itra Nasal Irrigation, Disp: , Rfl:      BABY ASPIRIN PO, , Disp: , Rfl:      Bismuth Subsalicylate (PEPTO-BISMOL PO), Take  by mouth as needed., Disp: , Rfl:      ZANTAC 150 MG PO TABS, Take  by mouth. As neded. , Disp: , Rfl:      ALAVERT OR, takes about 1-2 times per week as needed, Disp: , Rfl:      OCUVITE OR, once daily, Disp: , Rfl:      TYLENOL 325 MG PO TABS, as needed, Disp: , Rfl:     Allergies -   Allergies   Allergen Reactions     Cefuroxime      Other reaction(s): Stomach Upset     Ceftin GI Disturbance  "    Oxycodone-Acetaminophen Rash       Social History -   Social History     Social History     Marital Status:      Spouse Name: N/A     Number of Children: N/A     Years of Education: N/A     Social History Main Topics     Smoking status: Never Smoker      Smokeless tobacco: Never Used     Alcohol Use: Yes      Comment: 1-2 times a month     Drug Use: No     Sexual Activity:     Partners: Female     Other Topics Concern     Not on file     Social History Narrative       Family History -   Family History   Problem Relation Age of Onset     DIABETES Mother        Review of Systems - As per HPI and PMHx, otherwise 10+ system review of the head and neck, and general constitution is negative.    Physical Exam  Resp 12  Ht 1.727 m (5' 8\")  Wt 74.8 kg (165 lb)  BMI 25.09 kg/m2    General - The patient is well nourished and well developed, and appears to have good nutritional status.  Alert and oriented to person and place, answers questions and cooperates with examination appropriately.   Head and Face - Normocephalic and atraumatic, with no gross asymmetry noted of the contour of the facial features.  The facial nerve is intact, with strong symmetric movements.  Voice and Breathing - The patient was breathing comfortably without the use of accessory muscles. There was no wheezing, stridor, or stertor.  The patients voice was clear and strong, and had appropriate pitch and quality.  Ears - The tympanic membranes are normal in appearance, bony landmarks are intact.  No retraction, perforation, or masses.  Normal mobility on valsalva maneuver, with no reports of dizziness on insuflation.  No fluid or purulence was seen in the external canal or the middle ear. No evidence of infection of the middle ear or external canal, cerumen was normal in appearance.  Eyes - Extraocular movements intact, and the pupils were reactive to light.  Sclera were not icteric or injected, conjunctiva were pink and moist.  Mouth - " Examination of the oral cavity showed pink, healthy oral mucosa. No lesions or ulcerations noted.  The tongue was mobile and midline, and the dentition were in good condition.    Throat - The walls of the oropharynx were smooth, pink, moist, symmetric, and had no lesions or ulcerations.  The tonsillar pillars and soft palate were symmetric.  The uvula was midline on elevation.    Neck - Normal midline excursion of the laryngotracheal complex during swallowing.      To evaluate the nose in the postoperative state I performed rigid nasal endoscopy.  I first sprayed with lidocaine and neosynephrine.  I began with the LEFT side using a 2.7mm 30 degree rigid nasal endoscope, and color photographs were taken for the medical record.    The middle meatus was open, and I was able to pass the scope through.  Of note, I immediately noted a small 2mm polyp growing from the axilla fo the LEFT middle turbinate that was not there before.  The LEFT maxillary antrostomy is open (and of note is larger and more patent than the last visit), and looking down and into the LEFT maxillary sinus, the mucosa looks healthy, no abnormal secretions.  Going further back, the ethmoid roof is nicely re-mucosalized, and there is no abnormal secretions or polypoid degeneration.    The right side was then examined.  Of note, I immediately noted a small 2mm polyp growing from the axilla fo the RIGHT  middle turbinate that was not there before Looking down into the RIGHT maxillary sinus, the mucosa was flat and healthy in appearance.  Going further back the ethmoid system looks good.  The mucosa is healthy, and there were polyps or polypoid degenerations.        A/P - Robert Carolina is a 62 year old male  (J33.9) Nasal polyposis  (primary encounter diagnosis)  (J32.4) Chronic pansinusitis    Unfortunately there is polyp growth on the LEFT but now also new on the RIGHT.  I am going to try budesonide rinses in saline, in addition to the Gent Dex.  Then  see me back in 2 [facility].    (R05) Chronic cough  Comment: The remainder of today's visit was spent reviewing non-medical measures that can help in the management of acid reflux.  Specifically, avoidance of eating before bed, elevating the head of the bed, avoiding large meals, minimizing fatty foods, minimal wine/beer/soda, and eating smaller meals spread out through the day.

## 2017-12-13 ENCOUNTER — TELEPHONE (OUTPATIENT)
Dept: OTOLARYNGOLOGY | Facility: CLINIC | Age: 62
End: 2017-12-13

## 2017-12-13 NOTE — TELEPHONE ENCOUNTER
Reason for call:  Other   Patient called regarding (reason for call): prescription  Additional comments: Pharmacy calling for clarification of ingredients in compounded medication. Please call back to clarify.      Phone number to reach patient:  Other phone number:  [phone] ext [phone]*    Best Time:  ASAP    Can we leave a detailed message on this number?  Not Applicable

## 2017-12-15 ENCOUNTER — OFFICE VISIT (OUTPATIENT)
Dept: DERMATOLOGY | Facility: CLINIC | Age: 62
End: 2017-12-15
Payer: COMMERCIAL

## 2017-12-15 DIAGNOSIS — D48.5 NEOPLASM OF UNCERTAIN BEHAVIOR OF SKIN: ICD-10-CM

## 2017-12-15 DIAGNOSIS — Z86.018 HISTORY OF DYSPLASTIC NEVUS: Primary | ICD-10-CM

## 2017-12-15 DIAGNOSIS — D22.9 MULTIPLE BENIGN NEVI: ICD-10-CM

## 2017-12-15 PROCEDURE — 99213 OFFICE O/P EST LOW 20 MIN: CPT | Mod: 25 | Performed by: DERMATOLOGY

## 2017-12-15 PROCEDURE — 11100 HC BIOPSY SKIN/SUBQ/MUC MEM, SINGLE LESION: CPT | Performed by: DERMATOLOGY

## 2017-12-15 PROCEDURE — 88305 TISSUE EXAM BY PATHOLOGIST: CPT | Performed by: DERMATOLOGY

## 2017-12-15 NOTE — LETTER
12/15/2017       RE: Robert Carolina  [address] [place] [address]     Dear Colleague,    Thank you for referring your patient, Robert Carolina, to the [facility] at [facility]. Please see a copy of my visit note below.    Formerly [facility] Dermatology Note      Dermatology Problem List:  1. Compound nevus with moderate dysplasia, right abdomen  -s/p biospy 8/20/2015  2. NUB, lower back, s/p bx 12/15/17     Encounter Date: Dec 15, 2017    CC:  Chief Complaint   Patient presents with     RECHECK     1 year skin check - Hx of Dysplastic Nevus - spot of concern behind left knee         History of Present Illness:  Mr. Robert Carolina is a 62 year old male who presents as a follow-up for history of dysplastic nevus. The patient was last seen 12/16/2015 when a biopsy was performed on the left chest.  This resulted as a benign nevus. Today, the patient reports a lesion on the back of his left knee he would like evaluated. The patient reports no spots that are bleeding crusting or changing.     No other new or changing lesions reported.     The patient reports no other lesions of concern.      Past Medical History:   Patient Active Problem List   Diagnosis     CARDIOVASCULAR SCREENING; LDL GOAL LESS THAN 160     Nasal polyposis     Chronic sinusitis     Advanced directives, counseling/discussion     SCOTT (obstructive sleep apnea)-Mild (AHI 6)     Hashimoto's thyroiditis     Nevus of multiple sites of trunk     Hypothyroidism due to Hashimoto's thyroiditis     Elevated blood sugar     Nocturia     Dupuytren's contracture of both hands     Past Medical History:   Diagnosis Date     Bleeding ulcer 2008    Duodenal      Chronic sinusitis      Seasonal allergies      Past Surgical History:   Procedure Laterality Date     ARTHROSCOPY KNEE RT/LT      LT - Meniscus injury, Dr. 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 Is working part time at [employer]. He has a 1 child and a granddaughter.     Family History:  The patient reports a family history of nonmelanoma skin cancer in his father.    Medications:  Current Outpatient Prescriptions   Medication Sig Dispense Refill     budesonide (PULMICORT) 0.5 MG/2ML neb solution Spray 2 mLs (0.5 mg) in nostril daily 30 ampule 12     COMPOUNDED NON-CONTROLLED SUBSTANCE (CMPD RX) - PHARMACY TO MIX COMPOUNDED MEDICATION Spray 20 mLs in nostril 2 times daily Gent/Dex/Itra Nasal Irrigation 2000 mL 11     levothyroxine (SYNTHROID/LEVOTHROID) 88 MCG tablet Take 1 tablet (88 mcg) by mouth daily 90 tablet 3     BABY ASPIRIN PO        Bismuth Subsalicylate (PEPTO-BISMOL PO) Take  by mouth as needed.       ZANTAC 150 MG PO TABS Take  by mouth. As neded.        ALAVERT OR takes about 1-2 times per week as needed       OCUVITE OR once daily       TYLENOL 325 MG PO TABS as needed       clarithromycin (BIAXIN) 500 MG tablet Take 1 tablet (500 mg) by mouth 2 times daily (Patient not taking: Reported on 12/15/2017) 20 tablet 0     omeprazole (PRILOSEC) 40 MG capsule Take 1 capsule (40 mg) by mouth daily Take 30-60 minutes before a meal. (Patient not taking: Reported on 12/15/2017) 90 capsule 1     Allergies   Allergen Reactions     Cefuroxime      Other reaction(s): Stomach Upset     Ceftin GI Disturbance     Oxycodone-Acetaminophen Rash     Review of Systems:  -Const: Denies fevers, chills or changes in  weight. Denies changes in medical history.   -Skin: As above in HPI. No additional skin concerns.    Physical exam:  There were no vitals taken for this visit.  GEN: This is a well developed, well-nourished male in no acute distress, in a pleasant mood.    SKIN: Total skin excluding the undergarment areas was performed. The exam included the head/face, neck, both arms, chest, back, abdomen, both legs, digits and/or nails.   - 5 mm stuck on papule lower back   -There is a well healed surgical scar without erythema, nodularity or telangiectasias on the right abdomen.     - There are waxy stuck on tan to brown papules on the trunk and extremities.  - Multiple regular brown pigmented macules and papules are identified on the trunk and extremities.   -No other lesions of concern on areas examined.     Impression/Plan:  1. History of dysplastic nevus, no clincial evidence of recurrence:    ABCDs of melanoma were discussed and self skin checks were advised.     Sun precaution was advised including the use of sun screens of SPF 30 or higher, sun protective clothing, and avoidance of tanning beds.  2. Multiple clinically benign nevi on the trunk and extremities:    No further intervention required. Patient to report changes.   3. Seborrheic keratosis, non irritated, trunk and extremities including left knee:    No further intervention required. Patient to report changes.   4. Stuck on papule on the lower back.  Neoplasm of unknown behavior. Differential diagnosis SK vs other    Shave biopsy:  After discussion of benefits and risks including but not limited to bleeding/bruising, pain/swelling, infection, scar, incomplete removal, nerve damage/numbness, recurrence, and non-diagnostic biopsy, written consent, verbal consent and photographs were obtained. Time-out was performed. The area was cleaned with isopropyl alcohol.  was injected to obtain adequate anesthesia of the lesion on the lower back. 0.5 ml of 1% lidocaine with  epi was injected to obtain adequate anesthesia. A  shave biopsy was performed. Hemostasis was achieved with aluminium chloride. Vaseline and a sterile dressing were applied. The patient tolerated the procedure and no complications were noted. The patient was provided with verbal and written post care instructions.      Follow up in 6 months, earlier for new or changing lesions.     Staff Involved:  Scribe/Staff     Scribe Disclosure:   I, Carmencita Palumbo, am serving as a scribe to document services personally performed by Dr. Alisha Blackwood, based on data collection and the provider's statements to me.       Provider Disclosure:   The documentation recorded by the scribe accurately reflects the services I personally performed and the decisions made by me.    Alisha Blackwood MD    Department of Dermatology  [facility]: Phone: [phone], Fax:[phone]  [facility]: Phone: [phone], Fax: [phone]        Again, thank you for allowing me to participate in the care of your patient.      Sincerely,    Alisha Blackwood MD

## 2017-12-15 NOTE — PROGRESS NOTES
[facility] Dermatology Note      Dermatology Problem List:  1. Compound nevus with moderate dysplasia, right abdomen  -s/p biospy 8/20/2015  2. NUB, lower back, s/p bx 12/15/17     Encounter Date: Dec 15, 2017    CC:  Chief Complaint   Patient presents with     RECHECK     1 year skin check - Hx of Dysplastic Nevus - spot of concern behind left knee         History of Present Illness:  Mr. Robert Carolina is a 62 year old male who presents as a follow-up for history of dysplastic nevus. The patient was last seen 12/16/2015 when a biopsy was performed on the left chest.  This resulted as a benign nevus. Today, the patient reports a lesion on the back of his left knee he would like evaluated. The patient reports no spots that are bleeding crusting or changing.     No other new or changing lesions reported.     The patient reports no other lesions of concern.      Past Medical History:   Patient Active Problem List   Diagnosis     CARDIOVASCULAR SCREENING; LDL GOAL LESS THAN 160     Nasal polyposis     Chronic sinusitis     Advanced directives, counseling/discussion     SCOTT (obstructive sleep apnea)-Mild (AHI 6)     Hashimoto's thyroiditis     Nevus of multiple sites of trunk     Hypothyroidism due to Hashimoto's thyroiditis     Elevated blood sugar     Nocturia     Dupuytren's contracture of both hands     Past Medical History:   Diagnosis Date     Bleeding ulcer 2008    Duodenal      Chronic sinusitis      Seasonal allergies      Past Surgical History:   Procedure Laterality Date     ARTHROSCOPY KNEE RT/LT      LT - Meniscus injury, Dr. 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 He has a 1 child and a granddaughter.     Family History:  The patient reports a family history of nonmelanoma skin cancer in his father.    Medications:  Current Outpatient Prescriptions   Medication Sig Dispense Refill     budesonide (PULMICORT) 0.5 MG/2ML neb solution Spray 2 mLs (0.5 mg) in nostril daily 30 ampule 12     COMPOUNDED NON-CONTROLLED SUBSTANCE (CMPD RX) - PHARMACY TO MIX COMPOUNDED MEDICATION Spray 20 mLs in nostril 2 times daily Gent/Dex/Itra Nasal Irrigation 2000 mL 11     levothyroxine (SYNTHROID/LEVOTHROID) 88 MCG tablet Take 1 tablet (88 mcg) by mouth daily 90 tablet 3     BABY ASPIRIN PO        Bismuth Subsalicylate (PEPTO-BISMOL PO) Take  by mouth as needed.       ZANTAC 150 MG PO TABS Take  by mouth. As neded.        ALAVERT OR takes about 1-2 times per week as needed       OCUVITE OR once daily       TYLENOL 325 MG PO TABS as needed       clarithromycin (BIAXIN) 500 MG tablet Take 1 tablet (500 mg) by mouth 2 times daily (Patient not taking: Reported on 12/15/2017) 20 tablet 0     omeprazole (PRILOSEC) 40 MG capsule Take 1 capsule (40 mg) by mouth daily Take 30-60 minutes before a meal. (Patient not taking: Reported on 12/15/2017) 90 capsule 1     Allergies   Allergen Reactions     Cefuroxime      Other reaction(s): Stomach Upset     Ceftin GI Disturbance     Oxycodone-Acetaminophen Rash     Review of Systems:  -Const: Denies fevers, chills or changes in weight. Denies changes in medical history.   -Skin: As above in HPI. No additional skin concerns.    Physical exam:  There were no vitals taken for this visit.  GEN: This is a well developed, well-nourished male in no acute distress, in a pleasant mood.    SKIN: Total skin excluding the  undergarment areas was performed. The exam included the head/face, neck, both arms, chest, back, abdomen, both legs, digits and/or nails.   - 5 mm stuck on papule lower back   -There is a well healed surgical scar without erythema, nodularity or telangiectasias on the right abdomen.     - There are waxy stuck on tan to brown papules on the trunk and extremities.  - Multiple regular brown pigmented macules and papules are identified on the trunk and extremities.   -No other lesions of concern on areas examined.     Impression/Plan:  1. History of dysplastic nevus, no clincial evidence of recurrence:    ABCDs of melanoma were discussed and self skin checks were advised.     Sun precaution was advised including the use of sun screens of SPF 30 or higher, sun protective clothing, and avoidance of tanning beds.  2. Multiple clinically benign nevi on the trunk and extremities:    No further intervention required. Patient to report changes.   3. Seborrheic keratosis, non irritated, trunk and extremities including left knee:    No further intervention required. Patient to report changes.   4. Stuck on papule on the lower back.  Neoplasm of unknown behavior. Differential diagnosis SK vs other    Shave biopsy:  After discussion of benefits and risks including but not limited to bleeding/bruising, pain/swelling, infection, scar, incomplete removal, nerve damage/numbness, recurrence, and non-diagnostic biopsy, written consent, verbal consent and photographs were obtained. Time-out was performed. The area was cleaned with isopropyl alcohol.  was injected to obtain adequate anesthesia of the lesion on the lower back. 0.5 ml of 1% lidocaine with epi was injected to obtain adequate anesthesia. A  shave biopsy was performed. Hemostasis was achieved with aluminium chloride. Vaseline and a sterile dressing were applied. The patient tolerated the procedure and no complications were noted. The patient was provided with verbal and  written post care instructions.      Follow up in 6 months, earlier for new or changing lesions.     Staff Involved:  Scribe/Staff     Scribe Disclosure:   I, Carmencita Palumbo, am serving as a scribe to document services personally performed by Dr. Alisha Blackwood, based on data collection and the provider's statements to me.       Provider Disclosure:   The documentation recorded by the scribe accurately reflects the services I personally performed and the decisions made by me.    Alisha Blackwood MD    Department of Dermatology  [facility]: Phone: [phone], Fax:[phone]  [facility]: Phone: [phone], Fax: [phone]

## 2017-12-15 NOTE — PATIENT INSTRUCTIONS

## 2017-12-15 NOTE — MR AVS SNAPSHOT
After Visit Summary   12/15/2017    Robert Carolina    MRN: [id number]           Patient Information     Date Of Birth          1955        Visit Information        Provider Department      12/15/2017 10:45 AM Alisha Blackwood MD [facility]        Today's Diagnoses     History of dysplastic nevus    -  1    Multiple benign nevi        Neoplasm of uncertain behavior of skin          Care Instructions            Wound Care After a Biopsy    What is a skin biopsy?  A skin biopsy allows the doctor to examine a very small piece of tissue under the microscope to determine the diagnosis and the best treatment for the skin condition. A local anesthetic (numbing medicine)  is injected with a very small needle into the skin area to be tested. A small piece of skin is taken from the area. Sometimes a suture (stitch) is used.     What are the risks of a skin biopsy?  I will experience scar, bleeding, swelling, pain, crusting and redness. I may experience incomplete removal or recurrence. Risks of this procedure are excessive bleeding, bruising, infection, nerve damage, numbness, thick (hypertrophic or keloidal) scar and non-diagnostic biopsy.    How should I care for my wound for the first 24 hours?    Keep the wound dry and covered for 24 hours    If it bleeds, hold direct pressure on the area for 15 minutes. If bleeding does not stop then go to the emergency room    Avoid strenuous exercise the first 1-2 days or as your doctor instructs you    How should I care for the wound after 24 hours?    After 24 hours, remove the bandage    You may bathe or shower as normal    If you had a scalp biopsy, you can shampoo as usual and can use shower water to clean the biopsy site daily    Clean the wound twice a day with gentle soap and water    Do not scrub, be gentle    Apply white petroleum/Vaseline after cleaning the wound with a cotton swab or a clean finger, and keep the site covered with a  Bandaid /bandage. Bandages are not necessary with a scalp biopsy    If you are unable to cover the site with a Bandaid /bandage, re-apply ointment 2-3 times a day to keep the site moist. Moisture will help with healing    Avoid strenuous activity for first 1-2 days    Avoid lakes, rivers, pools, and oceans until the stitches are removed or the site is healed    How do I clean my wound?    Wash hands thoroughly with soap or use hand  before all wound care    Clean the wound with gentle soap and water    Apply white petroleum/Vaseline  to wound after it is clean    Replace the Bandaid /bandage to keep the wound covered for the first few days or as instructed by your doctor    If you had a scalp biopsy, warm shower water to the area on a daily basis should suffice    What should I use to clean my wound?     Cotton-tipped applicators (Qtips )    White petroleum jelly (Vaseline ). Use a clean new container and use Q-tips to apply.    Bandaids   as needed    Gentle soap     How should I care for my wound long term?    Do not get your wound dirty    Keep up with wound care for one week or until the area is healed.    A small scab will form and fall off by itself when the area is completely healed. The area will be red and will become pink in color as it heals. Sun protection is very important for how your scar will turn out. Sunscreen with an SPF 30 or greater is recommended once the area is healed.    You should have some soreness but it should be mild and slowly go away over several days. Talk to your doctor about using tylenol for pain,    When should I call my doctor?  If you have increased:     Pain or swelling    Pus or drainage (clear or slightly yellow drainage is ok)    Temperature over 100F    Spreading redness or warmth around wound    When will I hear about my results?  The biopsy results can take 2-3 weeks to come back. The clinic will call you with the results, send you a SR[facility] message, or have you  schedule a follow-up clinic or phone time to discuss the results. Contact our clinics if you do not hear from us in 3 weeks.     Who should I call with questions?    [facility]: [phone]     [facility]: [phone]    For urgent needs outside of business hours call the [facility] at [phone] and ask for the dermatology resident on call              Follow-ups after your visit        Follow-up notes from your care team     Return in about 1 year (around 12/15/2018).      Your next 10 appointments already scheduled     Feb 13, 2018  1:00 PM CST   Return Visit with Colton Wood MD   [facility] ([facility])    [address] [address] [phone]            Dec 11, 2018 11:00 AM CST   Return Visit with Alisha Blackwood MD   [facility]    [address] [place] [address] [phone]              Who to contact     If you have questions or need follow up information about today's clinic visit or your schedule please contact [facility] directly at [phone].  Normal or non-critical lab and imaging results will be communicated to you by MyChart, letter or phone within 4 business days after the clinic has received the results. If you do not hear from us within 7 days, please contact the clinic through MyChart or phone. If you have a critical or abnormal lab result, we will notify you by phone as soon as possible.  Submit refill requests through [employer] or call your pharmacy and they will forward the refill request to us. Please allow 3 business days for your refill to be completed.          Additional Information About Your Visit        Social[employer] Information     [employer] is an electronic gateway that provides easy, online access to your medical records. With [employer], you can request a  clinic appointment, read your test results, renew a prescription or communicate with your care team.     To sign up for [employer] visit the website at [email or web address]   You will be asked to enter the access code listed below, as well as some personal information. Please follow the directions to create your username and password.     Your access code is: [email or web address]  Expires: 3/12/2018  1:55 PM     Your access code will  in 90 days. If you need help or a new code, please contact your [facility] or call [phone] for assistance.        Care EveryWhere ID     This is your Care EveryWhere ID. This could be used by other organizations to access your [facility] medical records  [id number]         Blood Pressure from Last 3 Encounters:   17 145/85   16 146/82   11/23/15 135/86    Weight from Last 3 Encounters:   17 74.8 kg (165 lb)   17 68.9 kg (152 lb)   16 68 kg (150 lb)              We Performed the Following     BIOPSY SKIN/SUBQ/MUC MEM, SINGLE LESION     Surgical pathology exam        Primary Care Provider Office Phone # Fax #    Renee Neeta Byrd [phone]       [address] [facility] [address]        Equal Access to Services     EDUARDO VELASCO : Hadii aad ku hadasho Soomaali, waaxda luqadaha, qaybta kaalmada adeegyada, waxay gt webber . So [facility] [phone].    ATENCIÓN: Si habla español, tiene a montanez disposición servicios gratuitos de asistencia lingüística. Llame al [phone].    We comply with applicable federal civil rights laws and [place] laws. We do not discriminate on the basis of race, color, national origin, age, disability, sex, sexual orientation, or gender identity.            Thank you!     Thank you for choosing [facility]  for your care. Our goal is always to provide you with excellent care. Hearing back from our patients is one way we can continue  to improve our services. Please take a few minutes to complete the written survey that you may receive in the mail after your visit with us. Thank you!             Your Updated Medication List - Protect others around you: Learn how to safely use, store and throw away your medicines at www.disposemymeds.org.          This list is accurate as of: 12/15/17 11:16 AM.  Always use your most recent med list.                   Brand Name Dispense Instructions for use Diagnosis    ALAVERT PO      takes about 1-2 times per week as needed        BABY ASPIRIN PO           budesonide 0.5 MG/2ML neb solution    PULMICORT    30 ampule    Spray 2 mLs (0.5 mg) in nostril daily    Nasal polyposis, Chronic pansinusitis, Chronic cough       clarithromycin 500 MG tablet    BIAXIN    20 tablet    Take 1 tablet (500 mg) by mouth 2 times daily    Chronic pansinusitis       COMPOUNDED NON-CONTROLLED SUBSTANCE - PHARMACY TO MIX COMPOUNDED MEDICATION    CMPD RX    2000 mL    Spray 20 mLs in nostril 2 times daily Gent/Dex/Itra Nasal Irrigation    Nasal polyposis       levothyroxine 88 MCG tablet    SYNTHROID/LEVOTHROID    90 tablet    Take 1 tablet (88 mcg) by mouth daily    Chronic lymphocytic thyroiditis       OCUVITE PO      once daily        omeprazole 40 MG capsule    priLOSEC    90 capsule    Take 1 capsule (40 mg) by mouth daily Take 30-60 minutes before a meal.    Chronic cough, Chronic pansinusitis, Nasal polyposis       PEPTO-BISMOL PO      Take  by mouth as needed.        TYLENOL 325 MG tablet   Generic drug:  acetaminophen      as needed        ZANTAC 150 MG tablet   Generic drug:  ranitidine      Take  by mouth. As neded.

## 2017-12-15 NOTE — NURSING NOTE
Dermatology Rooming Note    Robert Carolina's goals for this visit include:   Chief Complaint   Patient presents with     RECHECK     1 year skin check - Hx of Dysplastic Nevus - spot of concern behind left knee       Is a scribe okay for this visit: YES    Are records needed for this visit(If yes, obtain release of information): NO     Vitals: There were no vitals taken for this visit.    Referring Provider:  No referring provider defined for this encounter.    Althea Antonio, CMA

## 2017-12-19 LAB — COPATH REPORT: NORMAL

## 2018-01-18 ENCOUNTER — OFFICE VISIT (OUTPATIENT)
Dept: FAMILY MEDICINE | Facility: CLINIC | Age: 63
End: 2018-01-18
Payer: COMMERCIAL

## 2018-01-18 VITALS
HEIGHT: 69 IN | DIASTOLIC BLOOD PRESSURE: 93 MMHG | BODY MASS INDEX: 23.55 KG/M2 | SYSTOLIC BLOOD PRESSURE: 158 MMHG | WEIGHT: 159 LBS | TEMPERATURE: 98 F | HEART RATE: 69 BPM | RESPIRATION RATE: 18 BRPM | OXYGEN SATURATION: 97 %

## 2018-01-18 DIAGNOSIS — N40.1 NOCTURIA ASSOCIATED WITH BENIGN PROSTATIC HYPERPLASIA: ICD-10-CM

## 2018-01-18 DIAGNOSIS — Z87.11 PERSONAL HISTORY OF GASTRIC ULCER: ICD-10-CM

## 2018-01-18 DIAGNOSIS — R03.0 ELEVATED BLOOD PRESSURE READING WITHOUT DIAGNOSIS OF HYPERTENSION: ICD-10-CM

## 2018-01-18 DIAGNOSIS — Z12.5 SCREENING FOR PROSTATE CANCER: ICD-10-CM

## 2018-01-18 DIAGNOSIS — I10 HYPERTENSION, GOAL BELOW 140/90: ICD-10-CM

## 2018-01-18 DIAGNOSIS — R35.1 NOCTURIA ASSOCIATED WITH BENIGN PROSTATIC HYPERPLASIA: ICD-10-CM

## 2018-01-18 DIAGNOSIS — Z12.11 SPECIAL SCREENING FOR MALIGNANT NEOPLASMS, COLON: ICD-10-CM

## 2018-01-18 DIAGNOSIS — E06.3 CHRONIC LYMPHOCYTIC THYROIDITIS: Primary | ICD-10-CM

## 2018-01-18 DIAGNOSIS — R73.9 ELEVATED BLOOD SUGAR: ICD-10-CM

## 2018-01-18 LAB
ALBUMIN SERPL-MCNC: 3.8 G/DL (ref 3.4–5)
ANION GAP SERPL CALCULATED.3IONS-SCNC: 7 MMOL/L (ref 3–14)
BUN SERPL-MCNC: 20 MG/DL (ref 7–30)
CALCIUM SERPL-MCNC: 9.1 MG/DL (ref 8.5–10.1)
CHLORIDE SERPL-SCNC: 102 MMOL/L (ref 94–109)
CO2 SERPL-SCNC: 30 MMOL/L (ref 20–32)
CREAT SERPL-MCNC: 0.88 MG/DL (ref 0.66–1.25)
ERYTHROCYTE [DISTWIDTH] IN BLOOD BY AUTOMATED COUNT: 13.3 % (ref 10–15)
GFR SERPL CREATININE-BSD FRML MDRD: 88 ML/MIN/1.7M2
GLUCOSE SERPL-MCNC: 126 MG/DL (ref 70–99)
HBA1C MFR BLD: 6 % (ref 4.3–6)
HCT VFR BLD AUTO: 45.2 % (ref 40–53)
HGB BLD-MCNC: 15.2 G/DL (ref 13.3–17.7)
MCH RBC QN AUTO: 32.1 PG (ref 26.5–33)
MCHC RBC AUTO-ENTMCNC: 33.6 G/DL (ref 31.5–36.5)
MCV RBC AUTO: 95 FL (ref 78–100)
PHOSPHATE SERPL-MCNC: 2.6 MG/DL (ref 2.5–4.5)
PLATELET # BLD AUTO: 206 10E9/L (ref 150–450)
POTASSIUM SERPL-SCNC: 3.7 MMOL/L (ref 3.4–5.3)
PSA SERPL-ACNC: 1.25 UG/L (ref 0–4)
RBC # BLD AUTO: 4.74 10E12/L (ref 4.4–5.9)
SODIUM SERPL-SCNC: 139 MMOL/L (ref 133–144)
T4 FREE SERPL-MCNC: 1.19 NG/DL (ref 0.76–1.46)
TSH SERPL DL<=0.005 MIU/L-ACNC: 6.7 MU/L (ref 0.4–4)
WBC # BLD AUTO: 8 10E9/L (ref 4–11)

## 2018-01-18 PROCEDURE — 84443 ASSAY THYROID STIM HORMONE: CPT | Performed by: FAMILY MEDICINE

## 2018-01-18 PROCEDURE — 99213 OFFICE O/P EST LOW 20 MIN: CPT | Mod: 25 | Performed by: FAMILY MEDICINE

## 2018-01-18 PROCEDURE — 83036 HEMOGLOBIN GLYCOSYLATED A1C: CPT | Performed by: FAMILY MEDICINE

## 2018-01-18 PROCEDURE — 80069 RENAL FUNCTION PANEL: CPT | Performed by: FAMILY MEDICINE

## 2018-01-18 PROCEDURE — 85027 COMPLETE CBC AUTOMATED: CPT | Performed by: FAMILY MEDICINE

## 2018-01-18 PROCEDURE — G0103 PSA SCREENING: HCPCS | Performed by: FAMILY MEDICINE

## 2018-01-18 PROCEDURE — 99396 PREV VISIT EST AGE 40-64: CPT | Performed by: FAMILY MEDICINE

## 2018-01-18 PROCEDURE — 36415 COLL VENOUS BLD VENIPUNCTURE: CPT | Performed by: FAMILY MEDICINE

## 2018-01-18 PROCEDURE — 84439 ASSAY OF FREE THYROXINE: CPT | Performed by: FAMILY MEDICINE

## 2018-01-18 RX ORDER — GENTAMICIN 40 MG/ML
INJECTION, SOLUTION INTRAMUSCULAR; INTRAVENOUS
COMMUNITY
Start: 2017-12-19 | End: 2018-01-18

## 2018-01-18 RX ORDER — TERAZOSIN 1 MG/1
CAPSULE ORAL
Qty: 70 CAPSULE | Refills: 1 | Status: SHIPPED | OUTPATIENT
Start: 2018-01-18 | End: 2018-03-12

## 2018-01-18 RX ORDER — LEVOTHYROXINE SODIUM 88 UG/1
88 TABLET ORAL DAILY
Qty: 90 TABLET | Refills: 3 | Status: CANCELLED | OUTPATIENT
Start: 2018-01-18

## 2018-01-18 ASSESSMENT — PAIN SCALES - GENERAL: PAINLEVEL: NO PAIN (0)

## 2018-01-18 NOTE — MR AVS SNAPSHOT
After Visit Summary   1/18/2018    Robert Carolina    MRN: [id number]           Patient Information     Date Of Birth          1955        Visit Information        Provider Department      1/18/2018 7:00 AM Renee Byrd MD [facility]        Today's Diagnoses     Chronic lymphocytic thyroiditis    -  1    Nasal polyposis        Chronic pansinusitis        Chronic cough        Elevated blood sugar        Personal history of gastric ulcer        Screening for prostate cancer        Elevated blood pressure reading without diagnosis of hypertension        Special screening for malignant neoplasms, colon        Nocturia associated with benign prostatic hyperplasia          Care Instructions    At [facility], we strive to deliver an exceptional experience to you, every time we see you.  If you receive a survey in the mail, please send us back your thoughts. We really do value your feedback.    Based on your medical history, these are the current health maintenance/preventive care services that you are due for (some may have been done at this visit.)  Health Maintenance Due   Topic Date Due     INFLUENZA VACCINE (SYSTEM ASSIGNED)  09/01/2017     ADVANCE DIRECTIVE PLANNING Q5 YRS  11/01/2017         Suggested websites for health information:  Www.[employer] : Up to date and easily searchable information on multiple topics.  Www.medlineplus.gov : medication info, interactive tutorials, watch real surgeries online  Www.familydoctor.org : good info from the Academy of Family Physicians  Www.cdc.gov : public health info, travel advisories, epidemics (H1N1)  Www.aap.org : children's health info, normal development, vaccinations  Www.health.[facility].[place].us : [place] dept of health, public health issues in [place], N1N1    Your care team:                            Family Medicine Internal Medicine   MD Kadeem Pruitt MD Shantel Branch-Fleming, MD Katya  Milton Leger MD Pediatrics   EDENILSON Landin, MD Margie Flores CNP, MD Deborah Mielke, MD Kim Thein, APRCARMELITA [facility]      Clinic hours: Monday - Thursday 7 am-7 pm; Fridays 7 am-5 pm.   Urgent care: Monday - Friday 11 am-9 pm; Saturday and Sunday 9 am-5 pm.  Pharmacy : Monday -Thursday 8 am-8 pm; Friday 8 am-6 pm; Saturday and Sunday 9 am-5 pm.     Clinic: [phone]   Pharmacy: [phone]    Preventive Health Recommendations  Male Ages 50 - 64    Yearly exam:             See your health care provider every year in order to  o   Review health changes.   o   Discuss preventive care.    o   Review your medicines if your doctor has prescribed any.     Have a cholesterol test every 5 years, or more frequently if you are at risk for high cholesterol/heart disease.     Have a diabetes test (fasting glucose) every three years. If you are at risk for diabetes, you should have this test more often.     Have a colonoscopy at age 50, or have a yearly FIT test (stool test). These exams will check for colon cancer.      Talk with your health care provider about whether or not a prostate cancer screening test (PSA) is right for you.    You should be tested each year for STDs (sexually transmitted diseases), if you re at risk.     Shots: Get a flu shot each year. Get a tetanus shot every 10 years.     Nutrition:    Eat at least 5 servings of fruits and vegetables daily.     Eat whole-grain bread, whole-wheat pasta and brown rice instead of white grains and rice.     Talk to your provider about Calcium and Vitamin D.     Lifestyle    Exercise for at least 150 minutes a week (30 minutes a day, 5 days a week). This will help you control your weight and prevent disease.     Limit alcohol to one drink per day.     No smoking.     Wear sunscreen to prevent skin cancer.     See your dentist every six months for an exam and cleaning.     See your eye doctor  every 1 to 2 years.    Prostate-Specific Antigen (PSA)  Does this test have other names?  PSA  What is this test?  This test measures the level of prostate-specific antigen (PSA) in your blood.  The cells of the prostate gland make the protein called PSA. Men normally have low levels of PSA. If your PSA levels start to rise, it could mean you have prostate cancer, benign prostate conditions, inflammation, or an infection.  PSA testing is controversial because the U.S. Preventative Services Task Force discourages men who don't have any symptoms of prostate cancer from being screened. The task force says that PSA test results can lead to treating small cancers that would never become life-threatening.  But the American Cancer Society believes men should be told the risks and benefits of PSA testing and allowed to make their own decision about if and when to be screened.  The American Urologic Society says that PSA screening is most important between the ages of 55 and 69. If you have a brother or father with prostate cancer or you are , you should start testing at age 40.   Why do I need this test?  You may have this test if you are 50 or older and your healthcare provider wants to screen you for prostate cancer. Some providers recommend screening at age 40 or 45 if you have a family history of prostate cancer or other risk factors.  You may also have this test if you have already been diagnosed with prostate cancer, so that your healthcare provider can monitor your treatment and see whether your cancer has come back.  What other tests might I have along with this test?  Your healthcare provider may also do a digital rectal exam (SUZETTE). A SUZETTE is a physical exam of the prostate, not a lab test. For the exam, your provider will place a gloved finger in your rectum and feel the prostate to check for any bumps or abnormal areas. The FDA recommends that a SUZETTE be done along with a PSA test.  What do my test  results mean?  Many things may affect your lab test results. These include the method each lab uses to do the test. Even if your test results are different from the normal value, you may not have a problem. To learn what the results mean for you, talk with your healthcare provider.  Results are given in nanograms per milliliter ng/mL. Normal results are below 4.0 ng/mL. A rising PSA may mean that you have cancer. But the PSA results alone won't tell your healthcare provider whether it's cancer or a benign prostate condition. If your provider suspects cancer, he or she will likely suggest that you have a biopsy of the prostate to make the diagnosis.  How is this test done?  The test requires a blood sample, which is drawn through a needle from a vein in your arm.  Does this test pose any risks?  Taking a blood sample with a needle carries risks that include bleeding, infection, bruising, or feeling dizzy. When the needle pricks your arm, you may feel a slight stinging sensation or pain. Afterward, the site may be slightly sore.  What might affect my test results?  An infection can affect your results, as can certain medicines. Riding a bicycle and ejaculation may also affect your results.  How do I get ready for this test?  You may need to abstain from sex and not ride a bicycle within 1 to 2 days of the test. In addition, be sure your healthcare provider knows about all medicines, herbs, vitamins, and supplements you are taking. This includes medicines that don't need a prescription and any illicit drugs you may use.       [id number] The [employer]. [address] [place], [place] [address]. All rights reserved. This information is not intended as a substitute for professional medical care. Always follow your healthcare professional's instructions.        BPH (Enlarged Prostate)  The prostate is a gland at the base of the bladder. As some men get older, the prostate may get bigger in size. This problem is  called benign prostatic hyperplasia (BPH). BPH puts pressure on the urethra. This is the tube that carries urine from the bladder to the penis. It may interfere with the flow of urine. It may also keep the bladder from emptying fully.    Symptoms of BPH include trouble starting urination and feeling as though the bladder isn t emptying all the way. It also includes a weak urine stream, dribbling and leaking of urine, and frequent and urgent urination (especially at night). BPH can increase the risk of urinary infections. It can also block off urine flow completely. If this occurs, a thin tube (catheter) may be passed into the bladder to help drain urine.  If symptoms are mild, no treatment may be needed right now. If symptoms are more severe, treatment is likely needed. The goal of treatment is to improve urine flow and reduce symptoms. Treatments can include medicine and procedures. Your healthcare provider will discuss treatment options with you as needed.  Home care  The following guidelines will help you care for yourself at home:    Urinate as soon as you feel the urge. Don't try to hold your urine.    Don't limit your fluid intake during the day. Drink 6 to 8 glasses of water or liquids a day. This prevents bacteria from building up in the bladder.    Avoid drinking fluids after dinner to help reduce urination during the night.    Avoid medicines that can worsen your symptoms. These include certain cold and allergy medicines and antidepressants. Diuretics used for high blood pressure can also worsen symptoms. Talk to your doctor about the medicines you take. Other choices may work better for you.  Prostate cancer screening  BPH does not increase the risk of prostate cancer. But because prostate cancer is a common cancer in men, screening is sometimes recommended. This may help detect the cancer in its early stages when treatment is most effective. Factors that can increase the risk of prostate cancer include  being -American or having a father or brother who had prostate cancer. A high-fat diet may also increase the risk of prostate cancer. Talk to your healthcare provider to see whether you should be screened for prostate cancer.  Follow-up care  Follow up with your healthcare provider, or as advised  To learn more, go to:    National Kidney & Urologic Diseases Information Clearinghouse  kidney.niddk.nih.gov, 904.687.2106  When to seek medical advice  Call your healthcare provider right away if any of these occur:    Fever of 100.4 F (38.0 C) or higher, or as advised    Unable to pass urine for 8 hours    Increasing pressure or pain in your bladder (lower abdomen)    Blood in the urine    Increasing low back pain, not related to injury    Symptoms of urinary infection (increased urge to urinate, burning when passing urine, foul-smelling urine)  Date Last Reviewed: 7/1/2016 2000-2017 [employer]. [address] [place] [address]. All rights reserved. This information is not intended as a substitute for professional medical care. Always follow your healthcare professional's instructions.                Follow-ups after your visit        Additional Services     UROLOGY ADULT REFERRAL       Your provider has referred you to: [facility]: [facility] [phone]   [email or web address]    Please be aware that coverage of these services is subject to the terms and limitations of your health insurance plan.  Call member services at your health plan with any benefit or coverage questions.      Please bring the following with you to your appointment:    (1) Any X-Rays, CTs or MRIs which have been performed.  Contact the facility where they were done to arrange for  prior to your scheduled appointment.    (2) List of current medications  (3) This referral request   (4) Any documents/labs given to you for this referral                 "  Follow-up notes from your care team     Return in about 4 weeks (around 2/15/2018) for BP Recheck, recheck.      Your next 10 appointments already scheduled     Feb 13, 2018  1:00 PM CST   [facility]    [address] [address]   [phone]            Dec 11, 2018 11:00 AM CST   [facility] Visit with Alisha Blackwood MD   [facility]    [address] [place] [id number] [phone]              Future tests that were ordered for you today     Open Future Orders        Priority Expected Expires Ordered    Fecal colorectal cancer screen (FIT) Routine 2/8/2018 4/12/2018 1/18/2018            Who to contact     If you have questions or need follow up information about today's clinic visit or your schedule please contact [facility] directly at [phone].  Normal or non-critical lab and imaging results will be communicated to you by Moneyspyderhart, letter or phone within 4 business days after the clinic has received the results. If you do not hear from us within 7 days, please contact the clinic through Moneyspyderhart or phone. If you have a critical or abnormal lab result, we will notify you by phone as soon as possible.  Submit refill requests through Tradeshift or call your pharmacy and they will forward the refill request to us. Please allow 3 business days for your refill to be completed.          Additional Information About Your Visit        [facility] Information     Tradeshift lets you send messages to your doctor, view your test results, renew your prescriptions, schedule appointments and more. To sign up, go to [email or web address] . Click on \"Log in\" on the left side of the screen, which will take you to the Welcome page. Then click on \"Sign up Now\" on the right side of the page.     You will be asked to enter the access code " "listed below, as well as some personal information. Please follow the directions to create your username and password.     Your access code is: [email or web address]  Expires: 3/12/2018  1:55 PM     Your access code will  in 90 days. If you need help or a new code, please call your [facility] or [phone].        Care EveryWhere ID     This is your Care EveryWhere ID. This could be used by other organizations to access your [facility] medical records  [id number]        Your Vitals Were     Pulse Temperature Respirations Height Pulse Oximetry BMI (Body Mass Index)    69 98  F (36.7  C) (Oral) 18 5' 9.25\" (1.759 m) 97% 23.31 kg/m2       Blood Pressure from Last 3 Encounters:   18 (!) 158/93   17 145/85   16 146/82    Weight from Last 3 Encounters:   18 159 lb (72.1 kg)   17 165 lb (74.8 kg)   17 152 lb (68.9 kg)              We Performed the Following     CBC with platelets     Hemoglobin A1c     PSA, screen     Renal panel     TSH with free T4 reflex     UROLOGY ADULT REFERRAL          Today's Medication Changes          These changes are accurate as of: 18  8:07 AM.  If you have any questions, ask your nurse or doctor.               Start taking these medicines.        Dose/Directions    terazosin 1 MG capsule   Commonly known as:  HYTRIN   Used for:  Nocturia associated with benign prostatic hyperplasia   Started by:  Renee Bryd MD        Start with 1 tab (1 mg) daily for 1 week, 2 tabs (2 mg) daily for 1 week, 3 tabs (3 mg) daily for 1 week, then 4 tabs (4 mg) daily   Quantity:  70 capsule   Refills:  1            Where to get your medicines      These medications were sent to [id number]  [address]  [address] AT [facility] [address] [place] [address]     Phone:  [phone]     terazosin 1 MG capsule                Primary Care Provider Office Phone # Fax #    Renee Byrd [phone] " [phone]       [address]  [place] [facility] [address]        Equal Access to Services     ROGER VELASCO : Hadii aad ku hadcriso Somitchellali, waaxda luqadaha, qaybta kaalmada adequanda, january gt daisymandie badilloariel devonpeggyshira casey. So [facility] [phone].    ATENCIÓN: Si habla español, tiene a montanez disposición servicios gratuitos de asistencia lingüística. Llame al [phone].    We comply with applicable federal civil rights laws and [place] laws. We do not discriminate on the basis of race, color, national origin, age, disability, sex, sexual orientation, or gender identity.            Thank you!     Thank you for choosing [facility]  for your care. Our goal is always to provide you with excellent care. Hearing back from our patients is one way we can continue to improve our services. Please take a few minutes to complete the written survey that you may receive in the mail after your visit with us. Thank you!             Your Updated Medication List - Protect others around you: Learn how to safely use, store and throw away your medicines at www.disposemymeds.org.          This list is accurate as of: 1/18/18  8:07 AM.  Always use your most recent med list.                   Brand Name Dispense Instructions for use Diagnosis    ALAVERT PO      takes about 1-2 times per week as needed        BABY ASPIRIN PO      Take 81 mg by mouth daily        budesonide 0.5 MG/2ML neb solution    PULMICORT    30 ampule    Spray 2 mLs (0.5 mg) in nostril daily    Nasal polyposis, Chronic pansinusitis, Chronic cough       COMPOUNDED NON-CONTROLLED SUBSTANCE - PHARMACY TO MIX COMPOUNDED MEDICATION    CMPD RX    2000 mL    Spray 20 mLs in nostril 2 times daily Gent/Dex/Itra Nasal Irrigation    Nasal polyposis       levothyroxine 88 MCG tablet    SYNTHROID/LEVOTHROID    90 tablet    Take 1 tablet (88 mcg) by mouth daily    Chronic lymphocytic thyroiditis       OCUVITE PO      once daily        PEPTO-BISMOL PO      Take   by mouth as needed.        terazosin 1 MG capsule    HYTRIN    70 capsule    Start with 1 tab (1 mg) daily for 1 week, 2 tabs (2 mg) daily for 1 week, 3 tabs (3 mg) daily for 1 week, then 4 tabs (4 mg) daily    Nocturia associated with benign prostatic hyperplasia       TYLENOL 325 MG tablet   Generic drug:  acetaminophen      4 time per week

## 2018-01-18 NOTE — PATIENT INSTRUCTIONS
At [facility], we strive to deliver an exceptional experience to you, every time we see you.  If you receive a survey in the mail, please send us back your thoughts. We really do value your feedback.    Based on your medical history, these are the current health maintenance/preventive care services that you are due for (some may have been done at this visit.)  Health Maintenance Due   Topic Date Due     INFLUENZA VACCINE (SYSTEM ASSIGNED)  09/01/2017     ADVANCE DIRECTIVE PLANNING Q5 YRS  11/01/2017         Suggested websites for health information:  Www.Patient Education Systems.Intent HQ : Up to date and easily searchable information on multiple topics.  Www.ModoPayments.gov : medication info, interactive tutorials, watch real surgeries online  Www.familydoctor.org : good info from the Academy of Family Physicians  Www.cdc.gov : public health info, travel advisories, epidemics (H1N1)  Www.aap.org : children's health info, normal development, vaccinations  Www.health.[facility].[place].us : [place] dept of health, public health issues in [place], N1N1    Your care team:                            Family Medicine Internal Medicine   MD Kadeem Pruitt MD Shantel Branch-Fleming, MD Katya Georgiev PA-C Nam Ho, MD Pediatrics   EDENILSON Landin, CNP Gisell Miranda APRN CNP   MD Margie Merritt MD Deborah Mielke, MD Kim Thein, APRN CNP      Clinic hours: Monday - Thursday 7 am-7 pm; Fridays 7 am-5 pm.   Urgent care: Monday - Friday 11 am-9 pm; Saturday and Sunday 9 am-5 pm.  Pharmacy : Monday -Thursday 8 am-8 pm; Friday 8 am-6 pm; Saturday and Sunday 9 am-5 pm.     Clinic: [phone]   Pharmacy: [phone]    Preventive Health Recommendations  Male Ages 50 - 64    Yearly exam:             See your health care provider every year in order to  o   Review health changes.   o   Discuss preventive care.    o   Review your medicines if your doctor has prescribed any.     Have a  cholesterol test every 5 years, or more frequently if you are at risk for high cholesterol/heart disease.     Have a diabetes test (fasting glucose) every three years. If you are at risk for diabetes, you should have this test more often.     Have a colonoscopy at age 50, or have a yearly FIT test (stool test). These exams will check for colon cancer.      Talk with your health care provider about whether or not a prostate cancer screening test (PSA) is right for you.    You should be tested each year for STDs (sexually transmitted diseases), if you re at risk.     Shots: Get a flu shot each year. Get a tetanus shot every 10 years.     Nutrition:    Eat at least 5 servings of fruits and vegetables daily.     Eat whole-grain bread, whole-wheat pasta and brown rice instead of white grains and rice.     Talk to your provider about Calcium and Vitamin D.     Lifestyle    Exercise for at least 150 minutes a week (30 minutes a day, 5 days a week). This will help you control your weight and prevent disease.     Limit alcohol to one drink per day.     No smoking.     Wear sunscreen to prevent skin cancer.     See your dentist every six months for an exam and cleaning.     See your eye doctor every 1 to 2 years.    Prostate-Specific Antigen (PSA)  Does this test have other names?  PSA  What is this test?  This test measures the level of prostate-specific antigen (PSA) in your blood.  The cells of the prostate gland make the protein called PSA. Men normally have low levels of PSA. If your PSA levels start to rise, it could mean you have prostate cancer, benign prostate conditions, inflammation, or an infection.  PSA testing is controversial because the U.S. Preventative Services Task Force discourages men who don't have any symptoms of prostate cancer from being screened. The task force says that PSA test results can lead to treating small cancers that would never become life-threatening.  But the American Cancer Society  believes men should be told the risks and benefits of PSA testing and allowed to make their own decision about if and when to be screened.  The American Urologic Society says that PSA screening is most important between the ages of 55 and 69. If you have a brother or father with prostate cancer or you are , you should start testing at age 40.   Why do I need this test?  You may have this test if you are 50 or older and your healthcare provider wants to screen you for prostate cancer. Some providers recommend screening at age 40 or 45 if you have a family history of prostate cancer or other risk factors.  You may also have this test if you have already been diagnosed with prostate cancer, so that your healthcare provider can monitor your treatment and see whether your cancer has come back.  What other tests might I have along with this test?  Your healthcare provider may also do a digital rectal exam (SUZETTE). A SUZETTE is a physical exam of the prostate, not a lab test. For the exam, your provider will place a gloved finger in your rectum and feel the prostate to check for any bumps or abnormal areas. The FDA recommends that a SUZETTE be done along with a PSA test.  What do my test results mean?  Many things may affect your lab test results. These include the method each lab uses to do the test. Even if your test results are different from the normal value, you may not have a problem. To learn what the results mean for you, talk with your healthcare provider.  Results are given in nanograms per milliliter ng/mL. Normal results are below 4.0 ng/mL. A rising PSA may mean that you have cancer. But the PSA results alone won't tell your healthcare provider whether it's cancer or a benign prostate condition. If your provider suspects cancer, he or she will likely suggest that you have a biopsy of the prostate to make the diagnosis.  How is this test done?  The test requires a blood sample, which is drawn through a  needle from a vein in your arm.  Does this test pose any risks?  Taking a blood sample with a needle carries risks that include bleeding, infection, bruising, or feeling dizzy. When the needle pricks your arm, you may feel a slight stinging sensation or pain. Afterward, the site may be slightly sore.  What might affect my test results?  An infection can affect your results, as can certain medicines. Riding a bicycle and ejaculation may also affect your results.  How do I get ready for this test?  You may need to abstain from sex and not ride a bicycle within 1 to 2 days of the test. In addition, be sure your healthcare provider knows about all medicines, herbs, vitamins, and supplements you are taking. This includes medicines that don't need a prescription and any illicit drugs you may use.       0426-0640 [employer]. [address] [place] [address]. All rights reserved. This information is not intended as a substitute for professional medical care. Always follow your healthcare professional's instructions.        BPH (Enlarged Prostate)  The prostate is a gland at the base of the bladder. As some men get older, the prostate may get bigger in size. This problem is called benign prostatic hyperplasia (BPH). BPH puts pressure on the urethra. This is the tube that carries urine from the bladder to the penis. It may interfere with the flow of urine. It may also keep the bladder from emptying fully.    Symptoms of BPH include trouble starting urination and feeling as though the bladder isn t emptying all the way. It also includes a weak urine stream, dribbling and leaking of urine, and frequent and urgent urination (especially at night). BPH can increase the risk of urinary infections. It can also block off urine flow completely. If this occurs, a thin tube (catheter) may be passed into the bladder to help drain urine.  If symptoms are mild, no treatment may be needed right now. If symptoms are  more severe, treatment is likely needed. The goal of treatment is to improve urine flow and reduce symptoms. Treatments can include medicine and procedures. Your healthcare provider will discuss treatment options with you as needed.  Home care  The following guidelines will help you care for yourself at home:    Urinate as soon as you feel the urge. Don't try to hold your urine.    Don't limit your fluid intake during the day. Drink 6 to 8 glasses of water or liquids a day. This prevents bacteria from building up in the bladder.    Avoid drinking fluids after dinner to help reduce urination during the night.    Avoid medicines that can worsen your symptoms. These include certain cold and allergy medicines and antidepressants. Diuretics used for high blood pressure can also worsen symptoms. Talk to your doctor about the medicines you take. Other choices may work better for you.  Prostate cancer screening  BPH does not increase the risk of prostate cancer. But because prostate cancer is a common cancer in men, screening is sometimes recommended. This may help detect the cancer in its early stages when treatment is most effective. Factors that can increase the risk of prostate cancer include being -American or having a father or brother who had prostate cancer. A high-fat diet may also increase the risk of prostate cancer. Talk to your healthcare provider to see whether you should be screened for prostate cancer.  Follow-up care  Follow up with your healthcare provider, or as advised  To learn more, go to:    National Kidney & Urologic Diseases Information Clearinghouse  kidney.niddk.nih.gov, 632.615.1474  When to seek medical advice  Call your healthcare provider right away if any of these occur:    Fever of 100.4 F (38.0 C) or higher, or as advised    Unable to pass urine for 8 hours    Increasing pressure or pain in your bladder (lower abdomen)    Blood in the urine    Increasing low back pain, not related to  injury    Symptoms of urinary infection (increased urge to urinate, burning when passing urine, foul-smelling urine)  Date Last Reviewed: 7/1/2016 2000-2017 The [employer]. [address] [place] [address], [place] [address]. All rights reserved. This information is not intended as a substitute for professional medical care. Always follow your healthcare professional's instructions.

## 2018-01-21 DIAGNOSIS — E06.3 CHRONIC LYMPHOCYTIC THYROIDITIS: ICD-10-CM

## 2018-01-21 RX ORDER — LEVOTHYROXINE SODIUM 100 UG/1
100 TABLET ORAL DAILY
Qty: 90 TABLET | Refills: 3 | Status: SHIPPED | OUTPATIENT
Start: 2018-01-21 | End: 2019-01-11

## 2018-01-21 NOTE — PROGRESS NOTES
Dear Robert    Your test results are attached. I am happy to let you know that they are stable.    The TSH is still high and this is your thyroid wanting the thyroid medication to be higher. I sent in a prescription for 100 mcg dose to see if this helps keep your level more normal.     The blood sugar is normal and you do not have diabetes. The test for prostate cancer was normal and shows low risk.     Recheck the thyroid test in 1-2 months. Future lab order placed and you can make a lab only appointment for this.     Please contact me by CEED Techt if you have any questions about your labs or management.    Renee Byrd MD

## 2018-01-23 DIAGNOSIS — Z12.11 SPECIAL SCREENING FOR MALIGNANT NEOPLASMS, COLON: ICD-10-CM

## 2018-01-23 LAB — HEMOCCULT STL QL IA: NEGATIVE

## 2018-01-23 PROCEDURE — 82274 ASSAY TEST FOR BLOOD FECAL: CPT | Performed by: FAMILY MEDICINE

## 2018-01-23 NOTE — LETTER
January 24, 2018      Robert Carolina  [address] [place] [address]        Dear ,    We are writing to inform you of your test results.    The lab results from the most recent visit are all normal or in a good range.     There was no blood in the stool. Recheck in 1 year.     Resulted Orders   Fecal colorectal cancer screen (FIT)   Result Value Ref Range    Occult Blood Scn FIT Negative NEG^Negative       If you have any questions or concerns, please call the clinic at the number listed above.       Sincerely,    Dr. Byrd

## 2018-01-24 NOTE — PROGRESS NOTES
Dear Robert Carolina,    The lab results from the most recent visit are all normal or in a good range.     There was no blood in the stool. Recheck in 1 year.    Please call me if you have any questions about your condition or care.     Sincerely,    Renee Byrd MD

## 2018-02-13 ENCOUNTER — OFFICE VISIT (OUTPATIENT)
Dept: OTOLARYNGOLOGY | Facility: CLINIC | Age: 63
End: 2018-02-13
Payer: COMMERCIAL

## 2018-02-13 VITALS — TEMPERATURE: 97 F | BODY MASS INDEX: 23.7 KG/M2 | HEIGHT: 69 IN | WEIGHT: 160 LBS

## 2018-02-13 DIAGNOSIS — J32.4 CHRONIC PANSINUSITIS: Primary | ICD-10-CM

## 2018-02-13 DIAGNOSIS — J33.9 NASAL POLYPOSIS: ICD-10-CM

## 2018-02-13 PROCEDURE — 99214 OFFICE O/P EST MOD 30 MIN: CPT | Mod: 25 | Performed by: OTOLARYNGOLOGY

## 2018-02-13 PROCEDURE — 31231 NASAL ENDOSCOPY DX: CPT | Performed by: OTOLARYNGOLOGY

## 2018-02-13 NOTE — MR AVS SNAPSHOT
After Visit Summary   2/13/2018    Robert Carolina    MRN: [id number]           Patient Information     Date Of Birth          1955        Visit Information        Provider Department      2/13/2018 1:00 PM Colton Wood MD [facility]        Today's Diagnoses     Chronic pansinusitis    -  1    Nasal polyposis          Care Instructions    Scheduling Information  To schedule your CT/MRI scan, please contact [facility] at [phone] OR [facility] at [phone]    To schedule your Surgery, please contact our Specialty Schedulers at [phone]      ENT Clinic Locations Clinic Hours Telephone Number     [facility] [place]  [address]  [place] [address]   Monday:           1:00pm -- 5:00pm    Friday:              8:00am - 12:00pm   To schedule/reschedule an appointment with   Dr. Wood,   please contact our   Specialty Scheduling Department at:     [phone]       [facility]  [address] [place] [address] Tuesday:          8:00am -- 2:00pm         Urgent Care Locations Clinic Hours Telephone Numbers     [facility]  [address] [place] [address]     Monday-Friday:     11:00am - 9:00pm    Saturday-Sunday:  9:00am - 5:00pm   [phone]     [facility]  [address] [place] [address]     Monday-Friday:      5:00pm - 9:00pm     Saturday-Sunday:  9:00am - 5:00pm   [phone]                 Follow-ups after your visit        Your next 10 appointments already scheduled     Mar 12, 2018  1:00 PM CDT   Office Visit with Renee Byrd MD   [facility] ([facility])    [address] [id number] [phone]           Bring a current list of meds and any records pertaining to this visit. For Physicals, please bring immunization records and any forms needing to be filled out. Please arrive 10 minutes early to  "complete paperwork.            Aug 14, 2018  1:00 PM CDT   [facility]   [facility]    [address] [address] [phone]            Dec 11, 2018 11:00 AM CST   Return Visit with Alisha Blackwood MD   [facility]    [address] [place] [id number] [phone]              Who to contact     If you have questions or need follow up information about today's clinic visit or your schedule please contact [facility] directly at [phone].  Normal or non-critical lab and imaging results will be communicated to you by MyChart, letter or phone within 4 business days after the clinic has received the results. If you do not hear from us within 7 days, please contact the clinic through MyChart or phone. If you have a critical or abnormal lab result, we will notify you by phone as soon as possible.  Submit refill requests through [employer] or call your pharmacy and they will forward the refill request to us. Please allow 3 business days for your refill to be completed.          Additional Information About Your Visit        Prior [employer] Information     [employer] gives you secure access to your electronic health record. If you see a primary care provider, you can also send messages to your care team and make appointments. If you have questions, please call your primary care clinic.  If you do not have a primary care provider, please call [phone] and they will assist you.        Care EveryWhere ID     This is your Care EveryWhere ID. This could be used by other organizations to access your [place] medical records  [id number]        Your Vitals Were     Temperature Height BMI (Body Mass Index)             97  F (36.1  C) (Tympanic) 1.753 m (5' 9\") 23.63 kg/m2          Blood Pressure from Last 3 Encounters:   01/18/18 (!) 158/93 "   01/09/17 145/85   01/11/16 146/82    Weight from Last 3 Encounters:   02/13/18 72.6 kg (160 lb)   01/18/18 72.1 kg (159 lb)   12/12/17 74.8 kg (165 lb)              We Performed the Following     Nasal Endoscopy, Diag        Primary Care Provider Office Phone # Fax #    Renee Neeta Byrd [phone]       [address]  [facility] [address]        Equal Access to Services     EDUARDO VELASCO : Hadii aad ku hadasho Soomaali, waaxda luqadaha, qaybta kaalmada adeegyada, waxay idiin hayaan adeeg kharash akbar . So [facility] [phone].    ATENCIÓN: Si habla español, tiene a montanez disposición servicios gratuitos de asistencia lingüística. [facility] [phone].    We comply with applicable federal civil rights laws and [place] laws. We do not discriminate on the basis of race, color, national origin, age, disability, sex, sexual orientation, or gender identity.            Thank you!     Thank you for choosing [facility]  for your care. Our goal is always to provide you with excellent care. Hearing back from our patients is one way we can continue to improve our services. Please take a few minutes to complete the written survey that you may receive in the mail after your visit with us. Thank you!             Your Updated Medication List - Protect others around you: Learn how to safely use, store and throw away your medicines at www.disposemymeds.org.          This list is accurate as of 2/13/18  1:18 PM.  Always use your most recent med list.                   Brand Name Dispense Instructions for use Diagnosis    ALAVERT PO      takes about 1-2 times per week as needed        BABY ASPIRIN PO      Take 81 mg by mouth daily        budesonide 0.5 MG/2ML neb solution    PULMICORT    30 ampule    Spray 2 mLs (0.5 mg) in nostril daily    Nasal polyposis, Chronic pansinusitis, Chronic cough       COMPOUNDED NON-CONTROLLED SUBSTANCE - PHARMACY TO MIX COMPOUNDED MEDICATION    CMPD RX     2000 mL    Spray 20 mLs in nostril 2 times daily Gent/Dex/Itra Nasal Irrigation    Nasal polyposis       levothyroxine 100 MCG tablet    SYNTHROID/LEVOTHROID    90 tablet    Take 1 tablet (100 mcg) by mouth daily    Chronic lymphocytic thyroiditis       OCUVITE PO      once daily        PEPTO-BISMOL PO      Take  by mouth as needed.        terazosin 1 MG capsule    HYTRIN    70 capsule    Start with 1 tab (1 mg) daily for 1 week, 2 tabs (2 mg) daily for 1 week, 3 tabs (3 mg) daily for 1 week, then 4 tabs (4 mg) daily    Nocturia associated with benign prostatic hyperplasia       TYLENOL 325 MG tablet   Generic drug:  acetaminophen      4 time per week

## 2018-02-13 NOTE — PROGRESS NOTES
History of Present Illness - Robert Carolina is a 61 year old male last seen on 12/12/2017    He is status post functinoal endoscopic sinus surgery for polyposis on 7/21/2011.    To review the past year, on exam at the 6/10/2013 visit, things looked good, with some residual disease on the LEFT.  I had him continue the rinses and at the visit on 9/16/13 visit there was still some polypoid disease on the LEFT side.    At the 12/16/13 visit things looked the best I had seen in a year, and I asked him to continue the same irrigation regimen, and also discussed adding surfactant.  He did not try the surfactant at that point, and tells me that unfortunately things had taken a slight turn for the worse at the beginning of 2014.  He could tell because he feels more congested and has a bit of a sore throat from increased post nasal drainage.  And at the 3/17/2014 visit there was clearly endoscopic evidence of purulent disease on the RIGHT side.  I added itraconazole to his Gent and Dex regimen, and also treated with 14 days of bactrim at the 3/17/14 visit.    At the 12/15/14 visit he told me that the only change he had made was adding a drop of baby shampoo to his sinus saline irrigations, and he is happy to report that he thinks it is helping.  His nose is still feeling great, much more open and clear, and there has been no facial pressure.    At the 6/23/2014 visit endoscopy showed his nose looked the best it had in years.    At 12/15/14 visit and the 4/13/15 visit, no changes, still using the rinses with baby shampoo in a saline, as well as medicated rinses 4x/Week.  Unfortunately at the 10/12/15 visit, there was a lot of purulent drainage seen on the LEFT.  SO we doubled the use of the compound irrigations and that got things under control.  So overall in 2015 and 2016, he has maintained himself on really nothing more than saline NeilMed irrigations with a bit of baby shampoo.    At the 8/22/17 visit, he also had issues  with a chronic tickling dry cough.  It seemed to be laryngopharyngeal reflux< so I started reflux medication and he is also here to follow up on that issue as well.  He tells me that the reflux medicaiton didn't seem to help, so he is trying dietary modifications.    At the 8//22/2017 exam, I did find a small early polyp on the LEFT side.  We continued medications, but he did end up having a full blown sinusitis in October.  Since that time, and also at the visit on 12/12/17, there was persistent polyp growth on the LEFT, and then again on the RIGHT.  Treatment with medicated irrigations, but there was still recurrent polyposis, and so at the end of the 12/12 visit I added Budesonide in NeilMed irrigations.      Past Medical History -   Patient Active Problem List   Diagnosis     CARDIOVASCULAR SCREENING; LDL GOAL LESS THAN 160     Nasal polyposis     Chronic sinusitis     Advanced directives, counseling/discussion     SCOTT (obstructive sleep apnea)-Mild (AHI 6)     Nevus of multiple sites of trunk     Hypothyroidism due to Hashimoto's thyroiditis     Elevated blood sugar     Nocturia     Dupuytren's contracture of both hands     Personal history of gastric ulcer     Nocturia associated with benign prostatic hyperplasia       Current Medications -   Current Outpatient Prescriptions:      levothyroxine (SYNTHROID/LEVOTHROID) 100 MCG tablet, Take 1 tablet (100 mcg) by mouth daily, Disp: 90 tablet, Rfl: 3     terazosin (HYTRIN) 1 MG capsule, Start with 1 tab (1 mg) daily for 1 week, 2 tabs (2 mg) daily for 1 week, 3 tabs (3 mg) daily for 1 week, then 4 tabs (4 mg) daily, Disp: 70 capsule, Rfl: 1     budesonide (PULMICORT) 0.5 MG/2ML neb solution, Spray 2 mLs (0.5 mg) in nostril daily (Patient not taking: Reported on 1/18/2018), Disp: 30 ampule, Rfl: 12     COMPOUNDED NON-CONTROLLED SUBSTANCE (CMPD RX) - PHARMACY TO MIX COMPOUNDED MEDICATION, Spray 20 mLs in nostril 2 times daily Gent/Dex/Itra Nasal Irrigation, Disp:  "2000 mL, Rfl: 11     BABY ASPIRIN PO, Take 81 mg by mouth daily , Disp: , Rfl:      Bismuth Subsalicylate (PEPTO-BISMOL PO), Take  by mouth as needed., Disp: , Rfl:      ALAVERT OR, takes about 1-2 times per week as needed, Disp: , Rfl:      OCUVITE OR, once daily, Disp: , Rfl:      TYLENOL 325 MG PO TABS, 4 time per week, Disp: , Rfl:     Allergies -   Allergies   Allergen Reactions     Cefuroxime      Other reaction(s): Stomach Upset     Ceftin GI Disturbance     Oxycodone-Acetaminophen Rash       Social History -   Social History     Social History     Marital Status:      Spouse Name: N/A     Number of Children: N/A     Years of Education: N/A     Social History Main Topics     Smoking status: Never Smoker      Smokeless tobacco: Never Used     Alcohol Use: Yes      Comment: 1-2 times a month     Drug Use: No     Sexual Activity:     Partners: Female     Other Topics Concern     Not on file     Social History Narrative       Family History -   Family History   Problem Relation Age of Onset     DIABETES Mother      Prostate Cancer Brother        Review of Systems - As per HPI and PMHx, otherwise 10+ system review of the head and neck, and general constitution is negative.    Physical Exam  Temp 97  F (36.1  C) (Tympanic)  Ht 1.753 m (5' 9\")  Wt 72.6 kg (160 lb)  BMI 23.63 kg/m2    General - The patient is well nourished and well developed, and appears to have good nutritional status.  Alert and oriented to person and place, answers questions and cooperates with examination appropriately.   Head and Face - Normocephalic and atraumatic, with no gross asymmetry noted of the contour of the facial features.  The facial nerve is intact, with strong symmetric movements.  Voice and Breathing - The patient was breathing comfortably without the use of accessory muscles. There was no wheezing, stridor, or stertor.  The patients voice was clear and strong, and had appropriate pitch and quality.  Ears - The tympanic " membranes are normal in appearance, bony landmarks are intact.  No retraction, perforation, or masses.  Normal mobility on valsalva maneuver, with no reports of dizziness on insuflation.  No fluid or purulence was seen in the external canal or the middle ear. No evidence of infection of the middle ear or external canal, cerumen was normal in appearance.  Eyes - Extraocular movements intact, and the pupils were reactive to light.  Sclera were not icteric or injected, conjunctiva were pink and moist.  Mouth - Examination of the oral cavity showed pink, healthy oral mucosa. No lesions or ulcerations noted.  The tongue was mobile and midline, and the dentition were in good condition.    Throat - The walls of the oropharynx were smooth, pink, moist, symmetric, and had no lesions or ulcerations.  The tonsillar pillars and soft palate were symmetric.  The uvula was midline on elevation.    Neck - Normal midline excursion of the laryngotracheal complex during swallowing.      To evaluate the nose in the postoperative state I performed rigid nasal endoscopy.  I first sprayed with lidocaine and neosynephrine.  I began with the LEFT side using a 2.7mm 30 degree rigid nasal endoscope, and color photographs were taken for the medical record.    The middle meatus was open, and I was able to pass the scope through.  The LEFT maxillary antrostomy is open, and looking down and into the LEFT maxillary sinus, the mucosa looks healthy, no abnormal secretions.  Going further back, the ethmoid roof is nicely re-mucosalized, and there is no abnormal secretions or polypoid degeneration.    The right side was then examined.  The middle meatus was open and I visualized the RIGHT antrostomy was open.  Looking down into the RIGHT maxillary sinus, the mucosa was flat and healthy in appearance.  Going further back the ethmoid system looks good.  The mucosa is healthy, and there were polyps or polypoid degenerations.        JACQUI/CHRISTIAN - Robert Carolina  is a 62 year old male  (J33.9) Nasal polyposis  (primary encounter diagnosis)  (J32.4) Chronic pansinusitis    The polyps have totally resolved, very impressive.    If things don't change, then follow up with me in 6 months.  Resume previous treatment regimen.

## 2018-02-13 NOTE — PATIENT INSTRUCTIONS
Scheduling Information  To schedule your CT/MRI scan, please contact [facility] at [phone] OR [facility] at [phone]    To schedule your Surgery, please contact our Specialty Schedulers at [phone]      ENT Clinic Locations Clinic Hours Telephone Number     [facility]  [address] [place] [address]   Monday:           1:00pm -- 5:00pm    Friday:              8:00am - 12:00pm   To schedule/reschedule an appointment with   Dr. Wood,   please contact our   Specialty Scheduling Department at:     [phone]       [facility]  [address] [place] [address] Tuesday:          8:00am -- 2:00pm         Urgent Care Locations Clinic Hours Telephone Numbers     [facility]  [address] [place] [place] [address]     Monday-Friday:     11:00am - 9:00pm    Saturday-Sunday:  9:00am - 5:00pm   [phone]     [facility]  [address] [address]     Monday-Friday:      5:00pm - 9:00pm     Saturday-Sunday:  9:00am - 5:00pm   [phone]

## 2018-02-13 NOTE — LETTER
2/13/2018         RE: Robert Carolina  [address] [place] [address]        Dear Colleague,    Thank you for referring your patient, Robert Carolina, to the [facility]. Please see a copy of my visit note below.    History of Present Illness - Robert Carolina is a 61 year old male last seen on 12/12/2017    He is status post functinoal endoscopic sinus surgery for polyposis on 7/21/2011.    To review the past year, on exam at the 6/10/2013 visit, things looked good, with some residual disease on the LEFT.  I had him continue the rinses and at the visit on 9/16/13 visit there was still some polypoid disease on the LEFT side.    At the 12/16/13 visit things looked the best I had seen in a year, and I asked him to continue the same irrigation regimen, and also discussed adding surfactant.  He did not try the surfactant at that point, and tells me that unfortunately things had taken a slight turn for the worse at the beginning of 2014.  He could tell because he feels more congested and has a bit of a sore throat from increased post nasal drainage.  And at the 3/17/2014 visit there was clearly endoscopic evidence of purulent disease on the RIGHT side.  I added itraconazole to his Gent and Dex regimen, and also treated with 14 days of bactrim at the 3/17/14 visit.    At the 12/15/14 visit he told me that the only change he had made was adding a drop of baby shampoo to his sinus saline irrigations, and he is happy to report that he thinks it is helping.  His nose is still feeling great, much more open and clear, and there has been no facial pressure.    At the 6/23/2014 visit endoscopy showed his nose looked the best it had in years.    At 12/15/14 visit and the 4/13/15 visit, no changes, still using the rinses with baby shampoo in a saline, as well as medicated rinses 4x/Week.  Unfortunately at the 10/12/15 visit, there was a lot of purulent drainage seen on the LEFT.  SO we doubled  the use of the compound irrigations and that got things under control.  So overall in 2015 and 2016, he has maintained himself on really nothing more than saline NeilMed irrigations with a bit of baby shampoo.    At the 8/22/17 visit, he also had issues with a chronic tickling dry cough.  It seemed to be laryngopharyngeal reflux< so I started reflux medication and he is also here to follow up on that issue as well.  He tells me that the reflux medicaiton didn't seem to help, so he is trying dietary modifications.    At the 8//22/2017 exam, I did find a small early polyp on the LEFT side.  We continued medications, but he did end up having a full blown sinusitis in October.  Since that time, and also at the visit on 12/12/17, there was persistent polyp growth on the LEFT, and then again on the RIGHT.  Treatment with medicated irrigations, but there was still recurrent polyposis, and so at the end of the 12/12 visit I added Budesonide in NeilMed irrigations.      Past Medical History -   Patient Active Problem List   Diagnosis     CARDIOVASCULAR SCREENING; LDL GOAL LESS THAN 160     Nasal polyposis     Chronic sinusitis     Advanced directives, counseling/discussion     SCOTT (obstructive sleep apnea)-Mild (AHI 6)     Nevus of multiple sites of trunk     Hypothyroidism due to Hashimoto's thyroiditis     Elevated blood sugar     Nocturia     Dupuytren's contracture of both hands     Personal history of gastric ulcer     Nocturia associated with benign prostatic hyperplasia       Current Medications -   Current Outpatient Prescriptions:      levothyroxine (SYNTHROID/LEVOTHROID) 100 MCG tablet, Take 1 tablet (100 mcg) by mouth daily, Disp: 90 tablet, Rfl: 3     terazosin (HYTRIN) 1 MG capsule, Start with 1 tab (1 mg) daily for 1 week, 2 tabs (2 mg) daily for 1 week, 3 tabs (3 mg) daily for 1 week, then 4 tabs (4 mg) daily, Disp: 70 capsule, Rfl: 1     budesonide (PULMICORT) 0.5 MG/2ML neb solution, Spray 2 mLs (0.5 mg) in  "nostril daily (Patient not taking: Reported on 1/18/2018), Disp: 30 ampule, Rfl: 12     COMPOUNDED NON-CONTROLLED SUBSTANCE (CMPD RX) - PHARMACY TO MIX COMPOUNDED MEDICATION, Spray 20 mLs in nostril 2 times daily Gent/Dex/Itra Nasal Irrigation, Disp: 2000 mL, Rfl: 11     BABY ASPIRIN PO, Take 81 mg by mouth daily , Disp: , Rfl:      Bismuth Subsalicylate (PEPTO-BISMOL PO), Take  by mouth as needed., Disp: , Rfl:      ALAVERT OR, takes about 1-2 times per week as needed, Disp: , Rfl:      OCUVITE OR, once daily, Disp: , Rfl:      TYLENOL 325 MG PO TABS, 4 time per week, Disp: , Rfl:     Allergies -   Allergies   Allergen Reactions     Cefuroxime      Other reaction(s): Stomach Upset     Ceftin GI Disturbance     Oxycodone-Acetaminophen Rash       Social History -   Social History     Social History     Marital Status:      Spouse Name: N/A     Number of Children: N/A     Years of Education: N/A     Social History Main Topics     Smoking status: Never Smoker      Smokeless tobacco: Never Used     Alcohol Use: Yes      Comment: 1-2 times a month     Drug Use: No     Sexual Activity:     Partners: Female     Other Topics Concern     Not on file     Social History Narrative       Family History -   Family History   Problem Relation Age of Onset     DIABETES Mother      Prostate Cancer Brother        Review of Systems - As per HPI and PMHx, otherwise 10+ system review of the head and neck, and general constitution is negative.    Physical Exam  Temp 97  F (36.1  C) (Tympanic)  Ht 1.753 m (5' 9\")  Wt 72.6 kg (160 lb)  BMI 23.63 kg/m2    General - The patient is well nourished and well developed, and appears to have good nutritional status.  Alert and oriented to person and place, answers questions and cooperates with examination appropriately.   Head and Face - Normocephalic and atraumatic, with no gross asymmetry noted of the contour of the facial features.  The facial nerve is intact, with strong symmetric " movements.  Voice and Breathing - The patient was breathing comfortably without the use of accessory muscles. There was no wheezing, stridor, or stertor.  The patients voice was clear and strong, and had appropriate pitch and quality.  Ears - The tympanic membranes are normal in appearance, bony landmarks are intact.  No retraction, perforation, or masses.  Normal mobility on valsalva maneuver, with no reports of dizziness on insuflation.  No fluid or purulence was seen in the external canal or the middle ear. No evidence of infection of the middle ear or external canal, cerumen was normal in appearance.  Eyes - Extraocular movements intact, and the pupils were reactive to light.  Sclera were not icteric or injected, conjunctiva were pink and moist.  Mouth - Examination of the oral cavity showed pink, healthy oral mucosa. No lesions or ulcerations noted.  The tongue was mobile and midline, and the dentition were in good condition.    Throat - The walls of the oropharynx were smooth, pink, moist, symmetric, and had no lesions or ulcerations.  The tonsillar pillars and soft palate were symmetric.  The uvula was midline on elevation.    Neck - Normal midline excursion of the laryngotracheal complex during swallowing.      To evaluate the nose in the postoperative state I performed rigid nasal endoscopy.  I first sprayed with lidocaine and neosynephrine.  I began with the LEFT side using a 2.7mm 30 degree rigid nasal endoscope, and color photographs were taken for the medical record.    The middle meatus was open, and I was able to pass the scope through.  The LEFT maxillary antrostomy is open, and looking down and into the LEFT maxillary sinus, the mucosa looks healthy, no abnormal secretions.  Going further back, the ethmoid roof is nicely re-mucosalized, and there is no abnormal secretions or polypoid degeneration.    The right side was then examined.  The middle meatus was open and I visualized the RIGHT antrostomy  was open.  Looking down into the RIGHT maxillary sinus, the mucosa was flat and healthy in appearance.  Going further back the ethmoid system looks good.  The mucosa is healthy, and there were polyps or polypoid degenerations.        A/P - Robert Carolina is a 62 year old male  (J33.9) Nasal polyposis  (primary encounter diagnosis)  (J32.4) Chronic pansinusitis    The polyps have totally resolved, very impressive.    If things don't change, then follow up with me in 6 months.  Resume previous treatment regimen.        Again, thank you for allowing me to participate in the care of your patient.        Sincerely,        Colton Wood MD

## 2018-03-14 ENCOUNTER — ALLIED HEALTH/NURSE VISIT (OUTPATIENT)
Dept: NURSING | Facility: CLINIC | Age: 63
End: 2018-03-14
Payer: COMMERCIAL

## 2018-03-14 VITALS — DIASTOLIC BLOOD PRESSURE: 70 MMHG | HEART RATE: 63 BPM | SYSTOLIC BLOOD PRESSURE: 122 MMHG

## 2018-03-14 DIAGNOSIS — Z01.30 BLOOD PRESSURE CHECK: Primary | ICD-10-CM

## 2018-03-14 DIAGNOSIS — E06.3 CHRONIC LYMPHOCYTIC THYROIDITIS: ICD-10-CM

## 2018-03-14 LAB — TSH SERPL DL<=0.005 MIU/L-ACNC: 2.38 MU/L (ref 0.4–4)

## 2018-03-14 PROCEDURE — 36415 COLL VENOUS BLD VENIPUNCTURE: CPT | Performed by: FAMILY MEDICINE

## 2018-03-14 PROCEDURE — 99207 ZZC NO CHARGE NURSE ONLY: CPT

## 2018-03-14 PROCEDURE — 84443 ASSAY THYROID STIM HORMONE: CPT | Performed by: FAMILY MEDICINE

## 2018-03-14 NOTE — PROGRESS NOTES
Ancillary BP check    HTN Guidelines:  Age 18-59 BP range:  Less than 140/90  Age 60-85 with Diabetes:  Less than 140/90  Age 60-85 without Diabetes:  less than 150/90        Robert Carolina is a 62 year old male who comes in today for a Blood Pressure check.    Patient is taking medication as prescribed  Patient is tolerating medications well.  Patient is not monitoring Blood Pressure at home.    BP goal: < 140/90mmHg (patient 18+ years of age with or without diabetes)    BP reading today: Passed     BP Readings from Last 1 Encounters:   03/14/18 122/70     A large cuff was used.  Pulse: 70    Vitals reviewed     Each reading recorded in vital signs section of chart: yes    Symptoms: none    Need for urgent appointment, based on criteria in ancillary BP workflow (elevated blood pressure and any of the following: dizziness, blurry vision, lightheadedness, severe shortness of breath, chest pain or visual disturbance): No       Plan: At goal follow up as directed by provider.      Completed by: Monse Nicole  [facility]

## 2018-03-14 NOTE — MR AVS SNAPSHOT
After Visit Summary   3/14/2018    Robert Carolina    MRN: [id number]           Patient Information     Date Of Birth          1955        Visit Information        Provider Department      3/14/2018 9:20 AM [facility] ANCILLARY [facility]        Today's Diagnoses     Blood pressure check    -  1       Follow-ups after your visit        Follow-up notes from your care team     Return for BP Recheck.      Your next 10 appointments already scheduled     Mar 23, 2018  7:20 AM CDT   Nurse Only with [facility] ANCILLARY   [facility]    [address] [address]   [phone]            Aug 14, 2018  1:00 PM CDT   Return Visit with Colton Wood MD   [facility]    [address] [place] [address]   [phone]            Dec 11, 2018 11:00 AM CST   Return Visit with Alisha Blackwood MD   [facility] ([facility])    [address] [place] [id number] [phone]              Who to contact     If you have questions or need follow up information about today's clinic visit or your schedule please contact [facility] directly at [phone].  Normal or non-critical lab and imaging results will be communicated to you by MyChart, letter or phone within 4 business days after the clinic has received the results. If you do not hear from us within 7 days, please contact the clinic through MyChart or phone. If you have a critical or abnormal lab result, we will notify you by phone as soon as possible.  Submit refill requests through [facility] or call your pharmacy and they will forward the refill request to us. Please allow 3 business days for your refill to be completed.          Additional Information About Your Visit        MyChart Information     [facility] gives you secure access to your  electronic health record. If you see a primary care provider, you can also send messages to your care team and make appointments. If you have questions, please call your primary care clinic.  If you do not have a primary care provider, please call [phone] and they will assist you.        Care EveryWhere ID     This is your Care EveryWhere ID. This could be used by other organizations to access your [facility] medical records  [id number]        Your Vitals Were     Pulse                   63            Blood Pressure from Last 3 Encounters:   03/14/18 122/70   01/18/18 (!) 158/93   01/09/17 145/85    Weight from Last 3 Encounters:   02/13/18 160 lb (72.6 kg)   01/18/18 159 lb (72.1 kg)   12/12/17 165 lb (74.8 kg)              Today, you had the following     No orders found for display       Primary Care Provider Office Phone # Fax #    Renee Neeta Byrd -[phone]       [address]  [facility] [address]        Equal Access to Services     [facility]: Hadii aad ku hadasho Soomaali, waaxda luqadaha, qaybta kaalmada adeegyada, waxay idiin haysumit howell khrosalind webber . So [facility] [phone].    ATENCIÓN: Si habla español, tiene a montanez disposición servicios gratuitos de asistencia lingüística. Llame al [phone].    We comply with applicable federal civil rights laws and [place] laws. We do not discriminate on the basis of race, color, national origin, age, disability, sex, sexual orientation, or gender identity.            Thank you!     Thank you for choosing [facility]  for your care. Our goal is always to provide you with excellent care. Hearing back from our patients is one way we can continue to improve our services. Please take a few minutes to complete the written survey that you may receive in the mail after your visit with us. Thank you!             Your Updated Medication List - Protect others around you: Learn how to safely use, store and throw away  your medicines at www.disposemymeds.org.          This list is accurate as of 3/14/18  9:45 AM.  Always use your most recent med list.                   Brand Name Dispense Instructions for use Diagnosis    ALAVERT PO      takes about 1-2 times per week as needed        BABY ASPIRIN PO      Take 81 mg by mouth daily        budesonide 0.5 MG/2ML neb solution    PULMICORT    30 ampule    Spray 2 mLs (0.5 mg) in nostril daily    Nasal polyposis, Chronic pansinusitis, Chronic cough       COMPOUNDED NON-CONTROLLED SUBSTANCE - PHARMACY TO MIX COMPOUNDED MEDICATION    CMPD RX    2000 mL    Spray 20 mLs in nostril 2 times daily Gent/Dex/Itra Nasal Irrigation    Nasal polyposis       levothyroxine 100 MCG tablet    SYNTHROID/LEVOTHROID    90 tablet    Take 1 tablet (100 mcg) by mouth daily    Chronic lymphocytic thyroiditis       OCUVITE PO      once daily        PEPTO-BISMOL PO      Take  by mouth as needed.        terazosin 1 MG capsule    HYTRIN    120 capsule    Take 4 capsules (4 mg) by mouth At Bedtime    Nocturia associated with benign prostatic hyperplasia       TYLENOL 325 MG tablet   Generic drug:  acetaminophen      4 time per week

## 2018-03-14 NOTE — PROGRESS NOTES
Dear Robert    Your test results are attached. I am happy to let you know that they are stable.    The thyroid test is normal. We can recheck labs in 6 months.     Please contact me by [employer] if you have any questions about your labs or management.    Renee Byrd MD

## 2018-03-15 ENCOUNTER — MYC MEDICAL ADVICE (OUTPATIENT)
Dept: FAMILY MEDICINE | Facility: CLINIC | Age: 63
End: 2018-03-15

## 2018-03-15 DIAGNOSIS — R35.1 NOCTURIA ASSOCIATED WITH BENIGN PROSTATIC HYPERPLASIA: ICD-10-CM

## 2018-03-15 DIAGNOSIS — N40.1 NOCTURIA ASSOCIATED WITH BENIGN PROSTATIC HYPERPLASIA: ICD-10-CM

## 2018-03-15 RX ORDER — TERAZOSIN 2 MG/1
2 CAPSULE ORAL AT BEDTIME
Qty: 90 CAPSULE | Refills: 3 | Status: SHIPPED | OUTPATIENT
Start: 2018-03-15 | End: 2019-03-21

## 2018-03-22 ENCOUNTER — TELEPHONE (OUTPATIENT)
Dept: OTOLARYNGOLOGY | Facility: CLINIC | Age: 63
End: 2018-03-22

## 2018-03-22 DIAGNOSIS — J32.4 CHRONIC PANSINUSITIS: Primary | ICD-10-CM

## 2018-03-22 NOTE — TELEPHONE ENCOUNTER
Prior Authorization Retail Medication Request    Medication/Dose: Compound Medication Gent/Dex/Itra Nasal Irrigation  ICD code (if different than what is on RX):    Previously Tried and Failed:    Rationale:      Insurance Name:  [employer]  Insurance ID:  [id number]       Pharmacy Information (if different than what is on RX)  Name:   Phone:

## 2018-03-22 NOTE — TELEPHONE ENCOUNTER
Reason for Call:  Other call back    Detailed comments: Patient needs a prior auth for his COMPOUNDED NON-CONTROLLED SUBSTANCE (CMPD RX) - PHARMACY TO MIX COMPOUNDED MEDICATION Gent/Dex/Itra Nasal Irrigation back dated to 01/01/2018. Patient states needs to be sent to [employer] he did not have that information    Phone Number Patient can be reached at: Home number on file [phone] (home)    Best Time: Any    Can we leave a detailed message on this number? YES    Call taken on 3/22/2018 at 10:45 AM by Cathryn Frances

## 2018-03-26 NOTE — TELEPHONE ENCOUNTER
PA Initiation    Medication:   Insurance Company: [employer] - Phone [phone] Fax [phone]  Pharmacy Filling the Rx: [facility] - [place], [place] - [address]  Filling Pharmacy Phone: [phone]  Filling Pharmacy Fax:    Start Date: 3/26/2018    THIS HAS BEEN SUBMITTED BY THE PRIOR-AUTHORIZATION TEAM. ANY QUESTIONS PLEASE CALL [phone]. THANK YOU

## 2018-03-28 NOTE — TELEPHONE ENCOUNTER
CALLED [facility] AND THEY ARE FAXING ME THE BREAKDOWN OF THE COMPOUNDED MEDICATION.  THEY DO NOT PROCESS ANYTHING THROUGH INSURANCE SO THEY WERE UNABLE TO PROCESS A CLAIM.  THEY GIVE PATIENTS A UNIVERSAL CLAIM FORM TO TURN IN TO [employer]. I CALLED PATIENT AND HE ALREADY DID THAT.  AWAITING FAX FROM [facility]

## 2018-03-28 NOTE — TELEPHONE ENCOUNTER
Prior Authorization Not Needed per Insurance    Medication:   Insurance Company: [email or web address] - Phone [phone] Fax [phone]  Expected CoPay:      Pharmacy Filling the Rx: [facility] - [place], [place] - [address]  Pharmacy Notified:  YES   Patient Notified: YES    SEE DOCUMENTATION BELOW.  PATIENT NEEDS TO SUBMIT WHAT HE PAID FOR MEDICATION TO BE REIMBURSED BY [employer]

## 2018-03-28 NOTE — TELEPHONE ENCOUNTER
..Reason for Call:    adding to previous message from today    Detailed comments: Patient is requesting the PA be back dated 01-    Phone Number Patient can be reached at: Home number on file [phone] (home)    Best Time: anytime    Can we leave a detailed message on this number? YES    Call taken on 3/28/2018 at 8:33 AM by Suyapa Collins

## 2018-03-28 NOTE — TELEPHONE ENCOUNTER
Insurance needs very specific details to approve this prior auth  They need the exact measurement of each of the 3 compounds  specific directions for usage    Can that be resent    Call patient if any questions  [phone]

## 2018-03-28 NOTE — TELEPHONE ENCOUNTER
PER [employer] ANY CLAIM UNDER $200 THAT PATIENT SUBMITS DOES NOT NEED A PA.  PER [employer] PATIENT HAS BEEN SUBMITTING HIS CLAIMS (THEY WERE UNDER $100). BECAUSE PHARMACY DOES NOT PROCESS CLAIMS THROUGH INSURANCE WE CANNOT DO A PA.   PATIENT WILL FOLLOW UP WITH [employer] TOMORROW.  GAVE HIM MY DIRECT EXTENSION [phone] IF ANY QUESTIONS ARISE.

## 2018-04-06 ENCOUNTER — TELEPHONE (OUTPATIENT)
Dept: OTOLARYNGOLOGY | Facility: CLINIC | Age: 63
End: 2018-04-06

## 2018-04-06 DIAGNOSIS — J33.9 NASAL POLYPOSIS: ICD-10-CM

## 2018-04-06 NOTE — TELEPHONE ENCOUNTER
Reason for Call:  Other call back    Detailed comments: [facility] calling to have a refill of patients Gentamicin/Dexamethasone 160/120mg/liter sent to [facility] pharmacy phone [phone]. Please call Emelia with any questions    Phone Number Patient can be reached at: Other phone number: [phone]     Best Time: Any    Can we leave a detailed message on this number? YES    Call taken on 4/6/2018 at 3:36 PM by Cathryn Frances

## 2018-04-09 ENCOUNTER — TELEPHONE (OUTPATIENT)
Dept: OTOLARYNGOLOGY | Facility: CLINIC | Age: 63
End: 2018-04-09

## 2018-04-09 NOTE — TELEPHONE ENCOUNTER
Contacted Margie at [facility] and notified her that patient is using nasal irrigation in both nostrils.    Pepe Brito RN....4/9/2018 3:23 PM

## 2018-04-09 NOTE — TELEPHONE ENCOUNTER
Called patient to clarify which side he was using nasal irrigations on. Then contacted [facility] pharmacy and left message to call back [phone] for med clarification.    Pepe Brito RN....4/9/2018 12:36 PM

## 2018-04-09 NOTE — TELEPHONE ENCOUNTER
Reason for Call:  Other call back    Detailed comments: Margie from [facility]  has questions regarding your order of nasal spray she is wanting to know if it is to be used in both sides of the nose. Please call and advise Thank you.    Phone Number Patient can be reached at: Other phone number:  [phone]    Best Time: any    Can we leave a detailed message on this number? YES    Call taken on 4/9/2018 at 12:25 PM by Nereyda Sin

## 2018-05-03 NOTE — TELEPHONE ENCOUNTER
Reason for Call:  Other prescription    Detailed comments: Please contact patient in regards to COMPOUNDED NON-CONTROLLED SUBSTANCE (CMPD RX) - PHARMACY TO MIX COMPOUNDED MEDICATION; he is confused about the prescriptions and ingredients.    Phone Number Patient can be reached at: Home number on file [phone] (home)    Best Time: ASAP    Can we leave a detailed message on this number? YES    Call taken on 5/3/2018 at 8:34 AM by Henny Cantu

## 2018-05-03 NOTE — TELEPHONE ENCOUNTER
Called and spoke with patient.  Previously, Vikki in [place] was able to mix the Gent/Dex with an itraconazole, all in one.  But with [facility] compounding the Gent/Dex and Itracon are separate solutions.  I have sent in a script for Itraconazole, and he will do both at the same time.  Call with any questions.

## 2018-05-15 ENCOUNTER — TELEPHONE (OUTPATIENT)
Dept: FAMILY MEDICINE | Facility: CLINIC | Age: 63
End: 2018-05-15

## 2018-05-15 NOTE — TELEPHONE ENCOUNTER
A prior authorization is needed for the following medication prescribed.  Please complete a prior authorization with the information included below.    Medication:FV-Itraconazole 0.01% Nasal Irrigation (compounded)  Ingredients: Sodium Chloride 0.9% Soln [address]-77, Propylene Glycol Soln [address]-81, Sporanox 10mg/ml Soln [address]-15, Sodium Hydroxide [facility] 68253-333-16  RX #: [id number]  Reason for Rejection:PA REQUIRED [phone]    Pharmacy Insurance plan: [employer]  BIN #: [id number]  ID #: [id number]  PCN #: [id number]  Phone #: [phone]      Pharmacy NPI:[id number]      Please advise the pharmacy when the prior authorization is approved or denied.     Thank you for your time.    [facility]  [phone]

## 2018-05-21 NOTE — TELEPHONE ENCOUNTER
Central Prior Authorization Team   Phone: [phone]      PA Initiation    Medication: FV-Itraconazole 0.01% Nasal Irrigation (compounded)  Insurance Company: [employer] - Phone [phone] Fax [phone]  Pharmacy Filling the Rx: [facility] - [place], [place] - [address]  Filling Pharmacy Phone: [phone]  Filling Pharmacy Fax: [phone]  Start Date: 5/21/2018

## 2018-05-22 NOTE — TELEPHONE ENCOUNTER
Prior Authorization Approval    Authorization Effective Date: 4/21/2018  Authorization Expiration Date: 5/19/2019  Medication: FV-Itraconazole 0.01% Nasal Irrigation (compounded) - Approved   Approved Dose/Quantity:    Reference #: [id number]   Insurance Company: [employer] ([employer]: [place]) - Phone [phone] Fax [phone]  Expected CoPay:       CoPay Card Available:      Foundation Assistance Needed:    Which Pharmacy is filling the prescription (Not needed for infusion/clinic administered): [facility] - [place], [place] - [address]  Pharmacy Notified: Yes  Patient Notified: Yes

## 2018-08-21 ENCOUNTER — OFFICE VISIT (OUTPATIENT)
Dept: OTOLARYNGOLOGY | Facility: CLINIC | Age: 63
End: 2018-08-21
Payer: COMMERCIAL

## 2018-08-21 VITALS
DIASTOLIC BLOOD PRESSURE: 79 MMHG | HEIGHT: 69 IN | WEIGHT: 160 LBS | RESPIRATION RATE: 12 BRPM | BODY MASS INDEX: 23.7 KG/M2 | OXYGEN SATURATION: 98 % | HEART RATE: 78 BPM | SYSTOLIC BLOOD PRESSURE: 145 MMHG

## 2018-08-21 DIAGNOSIS — J32.4 CHRONIC PANSINUSITIS: ICD-10-CM

## 2018-08-21 DIAGNOSIS — J33.9 NASAL POLYPOSIS: Primary | ICD-10-CM

## 2018-08-21 PROCEDURE — 31231 NASAL ENDOSCOPY DX: CPT | Performed by: OTOLARYNGOLOGY

## 2018-08-21 PROCEDURE — 99214 OFFICE O/P EST MOD 30 MIN: CPT | Mod: 25 | Performed by: OTOLARYNGOLOGY

## 2018-08-21 RX ORDER — PREDNISONE 10 MG/1
10 TABLET ORAL DAILY
Qty: 7 TABLET | Refills: 1 | Status: SHIPPED | OUTPATIENT
Start: 2018-08-21 | End: 2019-10-28

## 2018-08-21 RX ORDER — CLARITHROMYCIN 500 MG
500 TABLET ORAL 2 TIMES DAILY
Qty: 20 TABLET | Refills: 1 | Status: SHIPPED | OUTPATIENT
Start: 2018-08-21 | End: 2019-03-21

## 2018-08-21 NOTE — PATIENT INSTRUCTIONS
Scheduling Information  To schedule your CT/MRI scan, please contact [facility] at [phone] OR [facility] at [phone]    To schedule your Surgery, please contact our Specialty Schedulers at [phone]      ENT Clinic Locations Clinic Hours Telephone Number     [facility]  [address] [place] [address]   Monday:           1:00pm -- 5:00pm    Friday:              8:00am - 12:00pm   To schedule/reschedule an appointment with   Dr. Wood,   please contact our   Specialty Scheduling Department at:     [phone]       [facility]  [address] [place] [address] Tuesday:          8:00am -- 2:00pm         Urgent Care Locations Clinic Hours Telephone Numbers     [facility]  [address] [place] [place] [address]     Monday-Friday:     11:00am - 9:00pm    Saturday-Sunday:  9:00am - 5:00pm   [phone]     [facility]  [address] [place] [address]     Monday-Friday:      5:00pm - 9:00pm     Saturday-Sunday:  9:00am - 5:00pm   [phone]

## 2018-08-21 NOTE — LETTER
8/21/2018         RE: Robert Carolina  [address] [address]        Dear Colleague,    Thank you for referring your patient, Robert Carolina, to the [facility]. Please see a copy of my visit note below.    History of Present Illness - Robert Carolina is a 61 year old male last seen on 2/13/18    He is status post functinoal endoscopic sinus surgery for polyposis on 7/21/2011.    To review the past year, on exam at the 6/10/2013 visit, things looked good, with some residual disease on the LEFT.  I had him continue the rinses and at the visit on 9/16/13 visit there was still some polypoid disease on the LEFT side.    At the 12/16/13 visit things looked the best I had seen in a year, and I asked him to continue the same irrigation regimen, and also discussed adding surfactant.  He did not try the surfactant at that point, and tells me that unfortunately things had taken a slight turn for the worse at the beginning of 2014.  He could tell because he feels more congested and has a bit of a sore throat from increased post nasal drainage.  And at the 3/17/2014 visit there was clearly endoscopic evidence of purulent disease on the RIGHT side.  I added itraconazole to his Gent and Dex regimen, and also treated with 14 days of bactrim at the 3/17/14 visit.    At the 12/15/14 visit he told me that the only change he had made was adding a drop of baby shampoo to his sinus saline irrigations, and he is happy to report that he thinks it is helping.  His nose is still feeling great, much more open and clear, and there has been no facial pressure.    At the 6/23/2014 visit endoscopy showed his nose looked the best it had in years.    At 12/15/14 visit and the 4/13/15 visit, no changes, still using the rinses with baby shampoo in a saline, as well as medicated rinses 4x/Week.  Unfortunately at the 10/12/15 visit, there was a lot of purulent drainage seen on the LEFT.  SO we  doubled the use of the compound irrigations and that got things under control.  So overall in 2015 and 2016, he has maintained himself on really nothing more than saline NeilMed irrigations with a bit of baby shampoo.    At the 8/22/17 visit, he also had issues with a chronic tickling dry cough.  It seemed to be laryngopharyngeal reflux< so I started reflux medication and he is also here to follow up on that issue as well.  He tells me that the reflux medicaiton didn't seem to help, so he is trying dietary modifications.    At the 8//22/2017 exam, I did find a small early polyp on the LEFT side.  We continued medications, but he did end up having a full blown sinusitis in October.  Since that time, and also at the visit on 12/12/17, there was persistent polyp growth on the LEFT, and then again on the RIGHT.  Treatment with medicated irrigations, but there was still recurrent polyposis, and so at the end of the 12/12 visit I added Budesonide in NeilMed irrigations.    But at this point, he is only on Gent Dex irrigations.    He is currently fighting off a URI and is congested, and it started several weeks ago.    Past Medical History -   Patient Active Problem List   Diagnosis     CARDIOVASCULAR SCREENING; LDL GOAL LESS THAN 160     Nasal polyposis     Chronic sinusitis     Advanced directives, counseling/discussion     SCOTT (obstructive sleep apnea)-Mild (AHI 6)     Nevus of multiple sites of trunk     Hypothyroidism due to Hashimoto's thyroiditis     Elevated blood sugar     Nocturia     Dupuytren's contracture of both hands     Personal history of gastric ulcer     Nocturia associated with benign prostatic hyperplasia       Current Medications -   Current Outpatient Prescriptions:      ALAVERT OR, takes about 1-2 times per week as needed, Disp: , Rfl:      BABY ASPIRIN PO, Take 81 mg by mouth daily , Disp: , Rfl:      Bismuth Subsalicylate (PEPTO-BISMOL PO), Take  by mouth as needed., Disp: , Rfl:      budesonide  "(PULMICORT) 0.5 MG/2ML neb solution, Spray 2 mLs (0.5 mg) in nostril daily, Disp: 30 ampule, Rfl: 12     COMPOUNDED NON-CONTROLLED SUBSTANCE (CMPD RX) - PHARMACY TO MIX COMPOUNDED MEDICATION, Apply 20 mLs into each nare 2 times daily Itraconazole 0.01% solution, Disp: 2000 mL, Rfl: 6     COMPOUNDED NON-CONTROLLED SUBSTANCE (CMPD RX) - PHARMACY TO MIX COMPOUNDED MEDICATION, Spray 20 mLs in nostril 2 times daily Gent/Dex Nasal Irrigation, Disp: 2000 mL, Rfl: 11     levothyroxine (SYNTHROID/LEVOTHROID) 100 MCG tablet, Take 1 tablet (100 mcg) by mouth daily, Disp: 90 tablet, Rfl: 3     OCUVITE OR, once daily, Disp: , Rfl:      terazosin (HYTRIN) 2 MG capsule, Take 1 capsule (2 mg) by mouth At Bedtime, Disp: 90 capsule, Rfl: 3     TYLENOL 325 MG PO TABS, 4 time per week, Disp: , Rfl:     Allergies -   Allergies   Allergen Reactions     Cefuroxime      Other reaction(s): Stomach Upset     Ceftin GI Disturbance     Oxycodone-Acetaminophen Rash       Social History -   Social History     Social History     Marital Status:      Spouse Name: N/A     Number of Children: N/A     Years of Education: N/A     Social History Main Topics     Smoking status: Never Smoker      Smokeless tobacco: Never Used     Alcohol Use: Yes      Comment: 1-2 times a month     Drug Use: No     Sexual Activity:     Partners: Female     Other Topics Concern     Not on file     Social History Narrative       Family History -   Family History   Problem Relation Age of Onset     Diabetes Mother      Prostate Cancer Brother        Review of Systems - As per HPI and PMHx, otherwise 10+ system review of the head and neck, and general constitution is negative.    Physical Exam  /79  Pulse 78  Resp 12  Ht 1.753 m (5' 9\")  Wt 72.6 kg (160 lb)  SpO2 98%  BMI 23.63 kg/m2    General - The patient is well nourished and well developed, and appears to have good nutritional status.  Alert and oriented to person and place, answers questions and " cooperates with examination appropriately.   Head and Face - Normocephalic and atraumatic, with no gross asymmetry noted of the contour of the facial features.  The facial nerve is intact, with strong symmetric movements.  Voice and Breathing - The patient was breathing comfortably without the use of accessory muscles. There was no wheezing, stridor, or stertor.  The patients voice was clear and strong, and had appropriate pitch and quality.  Ears - The tympanic membranes are normal in appearance, bony landmarks are intact.  No retraction, perforation, or masses.  Normal mobility on valsalva maneuver, with no reports of dizziness on insuflation.  No fluid or purulence was seen in the external canal or the middle ear. No evidence of infection of the middle ear or external canal, cerumen was normal in appearance.  Eyes - Extraocular movements intact, and the pupils were reactive to light.  Sclera were not icteric or injected, conjunctiva were pink and moist.  Mouth - Examination of the oral cavity showed pink, healthy oral mucosa. No lesions or ulcerations noted.  The tongue was mobile and midline, and the dentition were in good condition.    Throat - The walls of the oropharynx were smooth, pink, moist, symmetric, and had no lesions or ulcerations.  The tonsillar pillars and soft palate were symmetric.  The uvula was midline on elevation.    Neck - Normal midline excursion of the laryngotracheal complex during swallowing.      To evaluate the nose in the postoperative state I performed rigid nasal endoscopy.  I first sprayed with lidocaine and neosynephrine.  I began with the LEFT side using a 2.7mm 30 degree rigid nasal endoscope, and color photographs were taken for the medical record.    The middle meatus was open, and I was able to pass the scope through.  The LEFT maxillary antrostomy is open, and looking down and into the LEFT maxillary sinus, the mucosa looks healthy, no abnormal secretions.  Going further  back, the ethmoid roof is nicely re-mucosalized, and there is no abnormal secretions or polypoid degeneration.    The right side was then examined.  The middle meatus was open and I visualized the RIGHT antrostomy was open.  Looking down into the RIGHT maxillary sinus, the mucosa was flat and healthy in appearance.  Going further back the ethmoid system looks good.  The mucosa is healthy, and there were polyps or polypoid degenerations.        A/P - Robert Carolina is a 62 year old male  (J33.9) Nasal polyposis  (primary encounter diagnosis)  (J32.4) Chronic pansinusitis    Although there is definitely a sinusitis, fortunately I see no signs of recurrent polyposis.    I will treat with 14 days of biaxin, and a burst of prednsione.  Assuming things go back to baseline, follow up annually, and contue the Gent Dex alone.  He asked about any possible issues with no longer using the itraconazole, and I think we can do without for now.      Again, thank you for allowing me to participate in the care of your patient.        Sincerely,        Colton Wood MD

## 2018-08-21 NOTE — PROGRESS NOTES
History of Present Illness - Robert Carolina is a 61 year old male last seen on 2/13/18    He is status post functinoal endoscopic sinus surgery for polyposis on 7/21/2011.    To review the past year, on exam at the 6/10/2013 visit, things looked good, with some residual disease on the LEFT.  I had him continue the rinses and at the visit on 9/16/13 visit there was still some polypoid disease on the LEFT side.    At the 12/16/13 visit things looked the best I had seen in a year, and I asked him to continue the same irrigation regimen, and also discussed adding surfactant.  He did not try the surfactant at that point, and tells me that unfortunately things had taken a slight turn for the worse at the beginning of 2014.  He could tell because he feels more congested and has a bit of a sore throat from increased post nasal drainage.  And at the 3/17/2014 visit there was clearly endoscopic evidence of purulent disease on the RIGHT side.  I added itraconazole to his Gent and Dex regimen, and also treated with 14 days of bactrim at the 3/17/14 visit.    At the 12/15/14 visit he told me that the only change he had made was adding a drop of baby shampoo to his sinus saline irrigations, and he is happy to report that he thinks it is helping.  His nose is still feeling great, much more open and clear, and there has been no facial pressure.    At the 6/23/2014 visit endoscopy showed his nose looked the best it had in years.    At 12/15/14 visit and the 4/13/15 visit, no changes, still using the rinses with baby shampoo in a saline, as well as medicated rinses 4x/Week.  Unfortunately at the 10/12/15 visit, there was a lot of purulent drainage seen on the LEFT.  SO we doubled the use of the compound irrigations and that got things under control.  So overall in 2015 and 2016, he has maintained himself on really nothing more than saline NeilMed irrigations with a bit of baby shampoo.    At the 8/22/17 visit, he also had issues  with a chronic tickling dry cough.  It seemed to be laryngopharyngeal reflux< so I started reflux medication and he is also here to follow up on that issue as well.  He tells me that the reflux medicaiton didn't seem to help, so he is trying dietary modifications.    At the 8//22/2017 exam, I did find a small early polyp on the LEFT side.  We continued medications, but he did end up having a full blown sinusitis in October.  Since that time, and also at the visit on 12/12/17, there was persistent polyp growth on the LEFT, and then again on the RIGHT.  Treatment with medicated irrigations, but there was still recurrent polyposis, and so at the end of the 12/12 visit I added Budesonide in NeilMed irrigations.    But at this point, he is only on Gent Dex irrigations.    He is currently fighting off a URI and is congested, and it started several weeks ago.    Past Medical History -   Patient Active Problem List   Diagnosis     CARDIOVASCULAR SCREENING; LDL GOAL LESS THAN 160     Nasal polyposis     Chronic sinusitis     Advanced directives, counseling/discussion     SCOTT (obstructive sleep apnea)-Mild (AHI 6)     Nevus of multiple sites of trunk     Hypothyroidism due to Hashimoto's thyroiditis     Elevated blood sugar     Nocturia     Dupuytren's contracture of both hands     Personal history of gastric ulcer     Nocturia associated with benign prostatic hyperplasia       Current Medications -   Current Outpatient Prescriptions:      ALAVERT OR, takes about 1-2 times per week as needed, Disp: , Rfl:      BABY ASPIRIN PO, Take 81 mg by mouth daily , Disp: , Rfl:      Bismuth Subsalicylate (PEPTO-BISMOL PO), Take  by mouth as needed., Disp: , Rfl:      budesonide (PULMICORT) 0.5 MG/2ML neb solution, Spray 2 mLs (0.5 mg) in nostril daily, Disp: 30 ampule, Rfl: 12     COMPOUNDED NON-CONTROLLED SUBSTANCE (CMPD RX) - PHARMACY TO MIX COMPOUNDED MEDICATION, Apply 20 mLs into each nare 2 times daily Itraconazole 0.01% solution,  "Disp: 2000 mL, Rfl: 6     COMPOUNDED NON-CONTROLLED SUBSTANCE (CMPD RX) - PHARMACY TO MIX COMPOUNDED MEDICATION, Spray 20 mLs in nostril 2 times daily Gent/Dex Nasal Irrigation, Disp: 2000 mL, Rfl: 11     levothyroxine (SYNTHROID/LEVOTHROID) 100 MCG tablet, Take 1 tablet (100 mcg) by mouth daily, Disp: 90 tablet, Rfl: 3     OCUVITE OR, once daily, Disp: , Rfl:      terazosin (HYTRIN) 2 MG capsule, Take 1 capsule (2 mg) by mouth At Bedtime, Disp: 90 capsule, Rfl: 3     TYLENOL 325 MG PO TABS, 4 time per week, Disp: , Rfl:     Allergies -   Allergies   Allergen Reactions     Cefuroxime      Other reaction(s): Stomach Upset     Ceftin GI Disturbance     Oxycodone-Acetaminophen Rash       Social History -   Social History     Social History     Marital Status:      Spouse Name: N/A     Number of Children: N/A     Years of Education: N/A     Social History Main Topics     Smoking status: Never Smoker      Smokeless tobacco: Never Used     Alcohol Use: Yes      Comment: 1-2 times a month     Drug Use: No     Sexual Activity:     Partners: Female     Other Topics Concern     Not on file     Social History Narrative       Family History -   Family History   Problem Relation Age of Onset     Diabetes Mother      Prostate Cancer Brother        Review of Systems - As per HPI and PMHx, otherwise 10+ system review of the head and neck, and general constitution is negative.    Physical Exam  /79  Pulse 78  Resp 12  Ht 1.753 m (5' 9\")  Wt 72.6 kg (160 lb)  SpO2 98%  BMI 23.63 kg/m2    General - The patient is well nourished and well developed, and appears to have good nutritional status.  Alert and oriented to person and place, answers questions and cooperates with examination appropriately.   Head and Face - Normocephalic and atraumatic, with no gross asymmetry noted of the contour of the facial features.  The facial nerve is intact, with strong symmetric movements.  Voice and Breathing - The patient was " breathing comfortably without the use of accessory muscles. There was no wheezing, stridor, or stertor.  The patients voice was clear and strong, and had appropriate pitch and quality.  Ears - The tympanic membranes are normal in appearance, bony landmarks are intact.  No retraction, perforation, or masses.  Normal mobility on valsalva maneuver, with no reports of dizziness on insuflation.  No fluid or purulence was seen in the external canal or the middle ear. No evidence of infection of the middle ear or external canal, cerumen was normal in appearance.  Eyes - Extraocular movements intact, and the pupils were reactive to light.  Sclera were not icteric or injected, conjunctiva were pink and moist.  Mouth - Examination of the oral cavity showed pink, healthy oral mucosa. No lesions or ulcerations noted.  The tongue was mobile and midline, and the dentition were in good condition.    Throat - The walls of the oropharynx were smooth, pink, moist, symmetric, and had no lesions or ulcerations.  The tonsillar pillars and soft palate were symmetric.  The uvula was midline on elevation.    Neck - Normal midline excursion of the laryngotracheal complex during swallowing.      To evaluate the nose in the postoperative state I performed rigid nasal endoscopy.  I first sprayed with lidocaine and neosynephrine.  I began with the LEFT side using a 2.7mm 30 degree rigid nasal endoscope, and color photographs were taken for the medical record.    The middle meatus was open, and I was able to pass the scope through.  The LEFT maxillary antrostomy is open, and looking down and into the LEFT maxillary sinus, the mucosa looks healthy, no abnormal secretions.  Going further back, the ethmoid roof is nicely re-mucosalized, and there is no abnormal secretions or polypoid degeneration.    The right side was then examined.  The middle meatus was open and I visualized the RIGHT antrostomy was open.  Looking down into the RIGHT maxillary  sinus, the mucosa was flat and healthy in appearance.  Going further back the ethmoid system looks good.  The mucosa is healthy, and there were polyps or polypoid degenerations.        A/P - Robert Carolina is a 62 year old male  (J33.9) Nasal polyposis  (primary encounter diagnosis)  (J32.4) Chronic pansinusitis    Although there is definitely a sinusitis, fortunately I see no signs of recurrent polyposis.    I will treat with 14 days of biaxin, and a burst of prednsione.  Assuming things go back to baseline, follow up annually, and contue the Gent Dex alone.  He asked about any possible issues with no longer using the itraconazole, and I think we can do without for now.

## 2018-08-21 NOTE — MR AVS SNAPSHOT
After Visit Summary   8/21/2018    Robert Carolina    MRN: [id number]           Patient Information     Date Of Birth          1955        Visit Information        Provider Department      8/21/2018 1:00 PM Colton Wood MD [facility]        Today's Diagnoses     Nasal polyposis    -  1    Chronic pansinusitis          Care Instructions    Scheduling Information  To schedule your CT/MRI scan, please contact [facility] at [phone] OR [facility] at [phone]    To schedule your Surgery, please contact our Specialty Schedulers at [phone]      ENT Clinic Locations Clinic Hours Telephone Number     [facility]  [address]  [place] [address]   Monday:           1:00pm -- 5:00pm    Friday:              8:00am - 12:00pm   To schedule/reschedule an appointment with   Dr. Wood,   please contact our   Specialty Scheduling Department at:     [phone]       [facility]  [address] [place] [address] Tuesday:          8:00am -- 2:00pm         Urgent Care Locations Clinic Hours Telephone Numbers     [facility]  [address] [place] [address]     Monday-Friday:     11:00am - 9:00pm    Saturday-Sunday:  9:00am - 5:00pm   [phone]     [facility]  [address] [place] [address]     Monday-Friday:      5:00pm - 9:00pm     Saturday-Sunday:  9:00am - 5:00pm   [phone]                 Follow-ups after your visit        Your next 10 appointments already scheduled     Dec 11, 2018 11:00 AM CST   Return Visit with Alisha Blackwood MD   [facility]    [id number] [place] [id number] [phone]              Who to contact     If you have questions or need follow up information about today's clinic visit or your schedule please contact [facility] directly at [phone].  Normal or non-critical lab  "and imaging results will be communicated to you by MyChart, letter or phone within 4 business days after the clinic has received the results. If you do not hear from us within 7 days, please contact the clinic through [employer] or phone. If you have a critical or abnormal lab result, we will notify you by phone as soon as possible.  Submit refill requests through [employer] or call your pharmacy and they will forward the refill request to us. Please allow 3 business days for your refill to be completed.          Additional Information About Your Visit        [employer] Information     [employer] gives you secure access to your electronic health record. If you see a primary care provider, you can also send messages to your care team and make appointments. If you have questions, please call your primary care clinic.  If you do not have a primary care provider, please call [phone] and they will assist you.        Care EveryWhere ID     This is your Care EveryWhere ID. This could be used by other organizations to access your [facility] medical records  [id number]        Your Vitals Were     Pulse Respirations Height Pulse Oximetry BMI (Body Mass Index)       78 12 1.753 m (5' 9\") 98% 23.63 kg/m2        Blood Pressure from Last 3 Encounters:   08/21/18 145/79   03/14/18 122/70   01/18/18 (!) 158/93    Weight from Last 3 Encounters:   08/21/18 72.6 kg (160 lb)   02/13/18 72.6 kg (160 lb)   01/18/18 72.1 kg (159 lb)              We Performed the Following     Nasal Endoscopy, Diag          Today's Medication Changes          These changes are accurate as of 8/21/18  1:35 PM.  If you have any questions, ask your nurse or doctor.               Start taking these medicines.        Dose/Directions    clarithromycin 500 MG tablet   Commonly known as:  BIAXIN   Used for:  Nasal polyposis, Chronic pansinusitis        Dose:  500 mg   Take 1 tablet (500 mg) by mouth 2 times daily   Quantity:  20 tablet   Refills:  1       predniSONE 10 " MG tablet   Commonly known as:  DELTASONE   Used for:  Nasal polyposis, Chronic pansinusitis        Dose:  10 mg   Take 1 tablet (10 mg) by mouth daily for 7 days   Quantity:  7 tablet   Refills:  1            Where to get your medicines      These medications were sent to [facility] - [place], [place] - [address] AT [facility] [address]  [address] [place] [address]     Phone:  [phone]     clarithromycin 500 MG tablet    predniSONE 10 MG tablet                Primary Care Provider Office Phone # Fax #    Renee Neeta Byrd [phone]       [address]  [facility] [address]        Equal Access to Services     [facility] : Hadii aad ku hadasho Soomaali, waaxda luqadaha, qaybta kaalmada adeegyada, waxay eloin haysumit webber . So [facility] [phone].    ATENCIÓN: Si habla español, tiene a montanez disposición servicios gratuitos de asistencia lingüística. [facility] [phone].    We comply with applicable federal civil rights laws and [place] laws. We do not discriminate on the basis of race, color, national origin, age, disability, sex, sexual orientation, or gender identity.            Thank you!     Thank you for choosing [facility]  for your care. Our goal is always to provide you with excellent care. Hearing back from our patients is one way we can continue to improve our services. Please take a few minutes to complete the written survey that you may receive in the mail after your visit with us. Thank you!             Your Updated Medication List - Protect others around you: Learn how to safely use, store and throw away your medicines at www.disposemymeds.org.          This list is accurate as of 8/21/18  1:35 PM.  Always use your most recent med list.                   Brand Name Dispense Instructions for use Diagnosis    ALAVERT PO      takes about 1-2 times per week as needed        BABY ASPIRIN PO      Take  81 mg by mouth daily        budesonide 0.5 MG/2ML neb solution    PULMICORT    30 ampule    Spray 2 mLs (0.5 mg) in nostril daily    Nasal polyposis, Chronic pansinusitis, Chronic cough       clarithromycin 500 MG tablet    BIAXIN    20 tablet    Take 1 tablet (500 mg) by mouth 2 times daily    Nasal polyposis, Chronic pansinusitis       COMPOUNDED NON-CONTROLLED SUBSTANCE - PHARMACY TO MIX COMPOUNDED MEDICATION    CMPD RX    2000 mL    Spray 20 mLs in nostril 2 times daily Gent/Dex Nasal Irrigation    Nasal polyposis       COMPOUNDED NON-CONTROLLED SUBSTANCE - PHARMACY TO MIX COMPOUNDED MEDICATION    CMPD RX    2000 mL    Apply 20 mLs into each nare 2 times daily Itraconazole 0.01% solution    Chronic pansinusitis       levothyroxine 100 MCG tablet    SYNTHROID/LEVOTHROID    90 tablet    Take 1 tablet (100 mcg) by mouth daily    Chronic lymphocytic thyroiditis       OCUVITE PO      once daily        PEPTO-BISMOL PO      Take  by mouth as needed.        predniSONE 10 MG tablet    DELTASONE    7 tablet    Take 1 tablet (10 mg) by mouth daily for 7 days    Nasal polyposis, Chronic pansinusitis       terazosin 2 MG capsule    HYTRIN    90 capsule    Take 1 capsule (2 mg) by mouth At Bedtime    Nocturia associated with benign prostatic hyperplasia       TYLENOL 325 MG tablet   Generic drug:  acetaminophen      4 time per week

## 2018-12-10 NOTE — PROGRESS NOTES
[facility] Dermatology Note      Dermatology Problem List:  1. Compound nevus with moderate dysplasia, right abdomen  -s/p biospy 8/20/2015    Encounter Date: Dec 11, 2018    CC:  Chief Complaint   Patient presents with     RECHECK     Adriano is returning for an annual skin check; areas on the back of his knees and top of the feet.          History of Present Illness:  Mr. Robert Carolina is a 63 year old male who presents as a follow-up for history of dysplastic nevus. The patient was last seen 12/15/17 when he had a benign keratosis on the back biopsied. Today, the patient reports that he has two new concerns to address today. First, he points out the area behind his knees, where he has a lesion that he can feel when he's taking a shower. Similarly, there is a lesion on the top of his left foot which he would like looked at. No other specific concerns. Uses sunscreen regularly.      Past Medical History:   Patient Active Problem List   Diagnosis     CARDIOVASCULAR SCREENING; LDL GOAL LESS THAN 160     Nasal polyposis     Chronic sinusitis     Advanced directives, counseling/discussion     SCOTT (obstructive sleep apnea)-Mild (AHI 6)     Nevus of multiple sites of trunk     Hypothyroidism due to Hashimoto's thyroiditis     Elevated blood sugar     Nocturia     Dupuytren's contracture of both hands     Personal history of gastric ulcer     Nocturia associated with benign prostatic hyperplasia     Past Medical History:   Diagnosis Date     Bleeding ulcer 2008    Duodenal      Chronic sinusitis      Seasonal allergies      Past Surgical History:   Procedure Laterality Date     ARTHROSCOPY KNEE RT/LT      LT - Meniscus injury, Dr. 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 Is working part time at [employer]. He has 3 grandkids.     Family History:  The patient reports a family history of nonmelanoma skin cancer in his father.    Medications:  Current Outpatient Medications   Medication Sig Dispense Refill     ALAVERT OR takes about 1-2 times per week as needed       BABY ASPIRIN PO Take 81 mg by mouth daily        Bismuth Subsalicylate (PEPTO-BISMOL PO) Take  by mouth as needed.       budesonide (PULMICORT) 0.5 MG/2ML neb solution Spray 2 mLs (0.5 mg) in nostril daily 30 ampule 12     clarithromycin (BIAXIN) 500 MG tablet Take 1 tablet (500 mg) by mouth 2 times daily 20 tablet 1     COMPOUNDED NON-CONTROLLED SUBSTANCE (CMPD RX) - PHARMACY TO MIX COMPOUNDED MEDICATION Apply 20 mLs into each nare 2 times daily Itraconazole 0.01% solution 2000 mL 6     COMPOUNDED NON-CONTROLLED SUBSTANCE (CMPD RX) - PHARMACY TO MIX COMPOUNDED MEDICATION Spray 20 mLs in nostril 2 times daily Gent/Dex Nasal Irrigation 2000 mL 11     levothyroxine (SYNTHROID/LEVOTHROID) 100 MCG tablet Take 1 tablet (100 mcg) by mouth daily 90 tablet 3     OCUVITE OR once daily       terazosin (HYTRIN) 2 MG capsule Take 1 capsule (2 mg) by mouth At Bedtime 90 capsule 3     TYLENOL 325 MG PO TABS 4 time per week       Allergies   Allergen Reactions     Cefuroxime      Other reaction(s): Stomach Upset     Ceftin GI Disturbance     Oxycodone-Acetaminophen Rash     Review of Systems:  -Const: Otherwise feeling well, in usual state of health.  -Skin: As above in HPI. No additional skin concerns.    Physical exam:  There were no vitals taken for this visit.  GEN: This is a well developed, well-nourished male in no acute  distress, in a pleasant mood.    SKIN: Total skin excluding the undergarment areas was performed. The exam included the head/face, neck, both arms, chest, back, abdomen, both legs, digits and/or nails. Exam included the buttocks. Declines genital exam.  - There is no erythema, telangectasias, nodularity, or pigmentation on the right abdomen.  - Stuck on 3 mm papule on the left dorsal foot  - There is a waxy stuck on tan to brown papule on the right popliteal fossa and left popliteal fossa.  -No other lesions of concern on areas examined.     Impression/Plan:  1. History of dysplastic nevus, no clincial evidence of recurrence:    ABCDs of melanoma were discussed and self skin checks were advised.     Sun precaution was advised including the use of sun screens of SPF 30 or higher, sun protective clothing, and avoidance of tanning beds.    2. Stuck on 3 mm papule on the left dorsal foot - consistent with seborrheic keratosis    No further intervention needed. reprot any changes    3. Seborrheic keratoses, popliteal fossae.    No further intervention needed.     Follow up in 1-2 years    Staff Involved:  Scribe/Staff     Scribe Disclosure  I, Tom Banks, am serving as a scribe to document services personally performed by Dr. Alisha Blackwood MD, based on data collection and the provider's statements to me.     Provider Disclosure:   The documentation recorded by the scribe accurately reflects the services I personally performed and the decisions made by me.    Alisha Blackwood MD    Department of Dermatology  [facility]: Phone: [phone], Fax:[phone]  [facility]: Phone: [phone], Fax: [phone]

## 2018-12-11 ENCOUNTER — OFFICE VISIT (OUTPATIENT)
Dept: DERMATOLOGY | Facility: CLINIC | Age: 63
End: 2018-12-11
Payer: COMMERCIAL

## 2018-12-11 DIAGNOSIS — L82.1 SEBORRHEIC KERATOSIS: Primary | ICD-10-CM

## 2018-12-11 DIAGNOSIS — Z86.018 HISTORY OF DYSPLASTIC NEVUS: ICD-10-CM

## 2018-12-11 PROCEDURE — 99213 OFFICE O/P EST LOW 20 MIN: CPT | Performed by: DERMATOLOGY

## 2018-12-11 ASSESSMENT — PAIN SCALES - GENERAL: PAINLEVEL: NO PAIN (0)

## 2018-12-11 NOTE — LETTER
12/11/2018         RE: Robert Carolina  [address] [facility] [address]        Dear Colleague,    Thank you for referring your patient, Robert Carolina, to the [facility]. Please see a copy of my visit note below.    [facility] Dermatology Note      Dermatology Problem List:  1. Compound nevus with moderate dysplasia, right abdomen  -s/p biospy 8/20/2015    Encounter Date: Dec 11, 2018    CC:  Chief Complaint   Patient presents with     RECHECK     Adriano is returning for an annual skin check; areas on the back of his knees and top of the feet.          History of Present Illness:  Mr. Robert Carolina is a 63 year old male who presents as a follow-up for history of dysplastic nevus. The patient was last seen 12/15/17 when he had a benign keratosis on the back biopsied. Today, the patient reports that he has two new concerns to address today. First, he points out the area behind his knees, where he has a lesion that he can feel when he's taking a shower. Similarly, there is a lesion on the top of his left foot which he would like looked at. No other specific concerns. Uses sunscreen regularly.      Past Medical History:   Patient Active Problem List   Diagnosis     CARDIOVASCULAR SCREENING; LDL GOAL LESS THAN 160     Nasal polyposis     Chronic sinusitis     Advanced directives, counseling/discussion     SCOTT (obstructive sleep apnea)-Mild (AHI 6)     Nevus of multiple sites of trunk     Hypothyroidism due to Hashimoto's thyroiditis     Elevated blood sugar     Nocturia     Dupuytren's contracture of both hands     Personal history of gastric ulcer     Nocturia associated with benign prostatic hyperplasia     Past Medical History:   Diagnosis Date     Bleeding ulcer 2008    Duodenal      Chronic sinusitis      Seasonal allergies      Past Surgical History:   Procedure Laterality Date     ARTHROSCOPY KNEE RT/LT      LT - Meniscus injury, Dr. 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 Is working part time at [employer]. He has 3 grandkids.     Family History:  The patient reports a family history of nonmelanoma skin cancer in his father.    Medications:  Current Outpatient Medications   Medication Sig Dispense Refill     ALAVERT OR takes about 1-2 times per week as needed       BABY ASPIRIN PO Take 81 mg by mouth daily        Bismuth Subsalicylate (PEPTO-BISMOL PO) Take  by mouth as needed.       budesonide (PULMICORT) 0.5 MG/2ML neb solution Spray 2 mLs (0.5 mg) in nostril daily 30 ampule 12     clarithromycin (BIAXIN) 500 MG tablet Take 1 tablet (500 mg) by mouth 2 times daily 20 tablet 1     COMPOUNDED NON-CONTROLLED SUBSTANCE (CMPD RX) - PHARMACY TO MIX COMPOUNDED MEDICATION Apply 20 mLs into each nare 2 times daily Itraconazole 0.01% solution 2000 mL 6     COMPOUNDED NON-CONTROLLED SUBSTANCE (CMPD RX) - PHARMACY TO MIX COMPOUNDED MEDICATION Spray 20 mLs in nostril 2 times daily Gent/Dex Nasal Irrigation 2000 mL 11     levothyroxine (SYNTHROID/LEVOTHROID) 100 MCG tablet Take 1 tablet (100 mcg) by mouth daily 90 tablet 3     OCUVITE OR once daily       terazosin (HYTRIN) 2 MG capsule Take 1 capsule (2 mg) by mouth At Bedtime 90 capsule 3     TYLENOL 325 MG PO TABS 4 time per week       Allergies   Allergen Reactions     Cefuroxime      Other reaction(s): Stomach Upset     Ceftin GI Disturbance     Oxycodone-Acetaminophen Rash     Review  of Systems:  -Const: Otherwise feeling well, in usual state of health.  -Skin: As above in HPI. No additional skin concerns.    Physical exam:  There were no vitals taken for this visit.  GEN: This is a well developed, well-nourished male in no acute distress, in a pleasant mood.    SKIN: Total skin excluding the undergarment areas was performed. The exam included the head/face, neck, both arms, chest, back, abdomen, both legs, digits and/or nails. Exam included the buttocks. Declines genital exam.  - There is no erythema, telangectasias, nodularity, or pigmentation on the right abdomen.  - Stuck on 3 mm papule on the left dorsal foot  - There is a waxy stuck on tan to brown papule on the right popliteal fossa and left popliteal fossa.  -No other lesions of concern on areas examined.     Impression/Plan:  1. History of dysplastic nevus, no clincial evidence of recurrence:    ABCDs of melanoma were discussed and self skin checks were advised.     Sun precaution was advised including the use of sun screens of SPF 30 or higher, sun protective clothing, and avoidance of tanning beds.    2. Stuck on 3 mm papule on the left dorsal foot - consistent with seborrheic keratosis    No further intervention needed. reprot any changes    3. Seborrheic keratoses, popliteal fossae.    No further intervention needed.     Follow up in 1-2 years    Staff Involved:  Scribe/Staff     Scribe Disclosure  I, Tom Banks, am serving as a scribe to document services personally performed by Dr. Alisha Blackwood MD, based on data collection and the provider's statements to me.     Provider Disclosure:   The documentation recorded by the scribe accurately reflects the services I personally performed and the decisions made by me.    Alisha Blackwood MD    Department of Dermatology  [facility]: Phone: [phone], Fax:[phone]  [facility]: Phone: [phone], Fax: [phone]        Again, thank you for allowing me to participate in the care of your patient.        Sincerely,        Alisha Blackwood MD

## 2018-12-11 NOTE — NURSING NOTE
Robert Carolina's goals for this visit include:   Chief Complaint   Patient presents with     RECHECK     Adriano is returning for an annual skin check; areas on the back of his knees and top of the feet.        He requests these members of his care team be copied on today's visit information:     PCP: Renee Byrd    Referring Provider:  No referring provider defined for this encounter.    There were no vitals taken for this visit.    Do you need any medication refills at today's visit? No  Marj Bach LPN

## 2019-01-11 DIAGNOSIS — E06.3 CHRONIC LYMPHOCYTIC THYROIDITIS: ICD-10-CM

## 2019-01-11 RX ORDER — LEVOTHYROXINE SODIUM 100 UG/1
TABLET ORAL
Qty: 90 TABLET | Refills: 0 | Status: SHIPPED | OUTPATIENT
Start: 2019-01-11 | End: 2019-03-21

## 2019-01-11 NOTE — TELEPHONE ENCOUNTER
"Requested Prescriptions   Pending Prescriptions Disp Refills     levothyroxine (SYNTHROID/LEVOTHROID) 100 MCG tablet [Pharmacy Med Name: LEVOTHYROXINE 0.100MG (100MCG) TAB] 90 tablet 0      Last Written Prescription Date:  01/21/18  Last Fill Quantity: 90,  # refills: 3   Last Office Visit with [facility], P or [facility] prescribing provider:  01/18/18-[place]   Future Office Visit:    Sig: TAKE 1 TABLET(100 MCG) BY MOUTH DAILY    Thyroid Protocol Passed - 1/11/2019  8:43 AM       Passed - Patient is 12 years or older       Passed - Recent (12 mo) or future (30 days) visit within the authorizing provider's specialty    Patient had office visit in the last 12 months or has a visit in the next 30 days with authorizing provider or within the authorizing provider's specialty.  See \"Patient Info\" tab in inbasket, or \"Choose Columns\" in Meds & Orders section of the refill encounter.             Passed - Medication is active on med list       Passed - Normal TSH on file in past 12 months    Recent Labs   Lab Test 03/14/18  0842   TSH 2.38                "

## 2019-01-11 NOTE — TELEPHONE ENCOUNTER
Prescription approved per [facility] Refill Protocol.    Gilda Dunham RN, [facility]

## 2019-01-21 PROBLEM — I10 HYPERTENSION, GOAL BELOW 140/90: Status: ACTIVE | Noted: 2019-01-21

## 2019-01-23 DIAGNOSIS — J32.4 CHRONIC PANSINUSITIS: ICD-10-CM

## 2019-01-23 DIAGNOSIS — J33.9 NASAL POLYPOSIS: ICD-10-CM

## 2019-01-23 DIAGNOSIS — R05.3 CHRONIC COUGH: ICD-10-CM

## 2019-01-23 RX ORDER — OMEPRAZOLE 40 MG/1
40 CAPSULE, DELAYED RELEASE ORAL DAILY
Qty: 90 CAPSULE | Refills: 1 | Status: SHIPPED | OUTPATIENT
Start: 2019-01-23 | End: 2020-05-29

## 2019-01-23 NOTE — TELEPHONE ENCOUNTER
"Requested Prescriptions   Pending Prescriptions Disp Refills     omeprazole (PRILOSEC) 40 MG DR capsule 90 capsule 1          Last Written Prescription Date:  8/22/17  Last Fill Quantity: 90,  # refills: 1   Last Office Visit with [facility], P or [facility] prescribing provider:  8/21/18   Future Office Visit:      Sig: Take 1 capsule (40 mg) by mouth daily Take 30-60 minutes before a meal.    PPI Protocol Failed - 1/23/2019  3:25 PM       Failed - Medication is active on med list       Passed - Not on Clopidogrel (unless Pantoprazole ordered)       Passed - No diagnosis of osteoporosis on record       Passed - Recent (12 mo) or future (30 days) visit within the authorizing provider's specialty    Patient had office visit in the last 12 months or has a visit in the next 30 days with authorizing provider or within the authorizing provider's specialty.  See \"Patient Info\" tab in inbasket, or \"Choose Columns\" in Meds & Orders section of the refill encounter.             Passed - Patient is age 18 or older              Jamie Faarax  [facility]  "

## 2019-03-21 ENCOUNTER — OFFICE VISIT (OUTPATIENT)
Dept: FAMILY MEDICINE | Facility: CLINIC | Age: 64
End: 2019-03-21
Payer: COMMERCIAL

## 2019-03-21 VITALS
WEIGHT: 161 LBS | SYSTOLIC BLOOD PRESSURE: 139 MMHG | RESPIRATION RATE: 18 BRPM | BODY MASS INDEX: 23.05 KG/M2 | HEIGHT: 70 IN | TEMPERATURE: 98.4 F | HEART RATE: 78 BPM | OXYGEN SATURATION: 99 % | DIASTOLIC BLOOD PRESSURE: 83 MMHG

## 2019-03-21 DIAGNOSIS — N40.1 NOCTURIA ASSOCIATED WITH BENIGN PROSTATIC HYPERPLASIA: ICD-10-CM

## 2019-03-21 DIAGNOSIS — M79.661 PAIN IN BOTH LOWER LEGS: ICD-10-CM

## 2019-03-21 DIAGNOSIS — Z11.4 SCREENING FOR HIV (HUMAN IMMUNODEFICIENCY VIRUS): ICD-10-CM

## 2019-03-21 DIAGNOSIS — Z80.42 FAMILY HX OF PROSTATE CANCER: ICD-10-CM

## 2019-03-21 DIAGNOSIS — M79.662 PAIN IN BOTH LOWER LEGS: ICD-10-CM

## 2019-03-21 DIAGNOSIS — Z87.11 PERSONAL HISTORY OF GASTRIC ULCER: ICD-10-CM

## 2019-03-21 DIAGNOSIS — R35.1 NOCTURIA ASSOCIATED WITH BENIGN PROSTATIC HYPERPLASIA: ICD-10-CM

## 2019-03-21 DIAGNOSIS — R73.9 ELEVATED BLOOD SUGAR: ICD-10-CM

## 2019-03-21 DIAGNOSIS — Z12.11 SCREEN FOR COLON CANCER: ICD-10-CM

## 2019-03-21 DIAGNOSIS — E06.3 HYPOTHYROIDISM DUE TO HASHIMOTO'S THYROIDITIS: ICD-10-CM

## 2019-03-21 DIAGNOSIS — Z00.00 ROUTINE HISTORY AND PHYSICAL EXAMINATION OF ADULT: Primary | ICD-10-CM

## 2019-03-21 DIAGNOSIS — G47.33 OSA (OBSTRUCTIVE SLEEP APNEA): ICD-10-CM

## 2019-03-21 DIAGNOSIS — I10 HYPERTENSION, GOAL BELOW 140/90: ICD-10-CM

## 2019-03-21 DIAGNOSIS — E78.5 HYPERLIPIDEMIA LDL GOAL <130: ICD-10-CM

## 2019-03-21 LAB
ALBUMIN SERPL-MCNC: 3.8 G/DL (ref 3.4–5)
ALT SERPL W P-5'-P-CCNC: 34 U/L (ref 0–70)
ANION GAP SERPL CALCULATED.3IONS-SCNC: 5 MMOL/L (ref 3–14)
BUN SERPL-MCNC: 18 MG/DL (ref 7–30)
CALCIUM SERPL-MCNC: 9.5 MG/DL (ref 8.5–10.1)
CHLORIDE SERPL-SCNC: 105 MMOL/L (ref 94–109)
CHOLEST SERPL-MCNC: 181 MG/DL
CO2 SERPL-SCNC: 30 MMOL/L (ref 20–32)
CREAT SERPL-MCNC: 0.85 MG/DL (ref 0.66–1.25)
CREAT UR-MCNC: 135 MG/DL
ERYTHROCYTE [DISTWIDTH] IN BLOOD BY AUTOMATED COUNT: 13.4 % (ref 10–15)
GFR SERPL CREATININE-BSD FRML MDRD: >90 ML/MIN/{1.73_M2}
GLUCOSE SERPL-MCNC: 151 MG/DL (ref 70–99)
HBA1C MFR BLD: 6.2 % (ref 0–5.6)
HCT VFR BLD AUTO: 45.6 % (ref 40–53)
HDLC SERPL-MCNC: 56 MG/DL
HGB BLD-MCNC: 15.5 G/DL (ref 13.3–17.7)
LDLC SERPL CALC-MCNC: 107 MG/DL
MCH RBC QN AUTO: 32 PG (ref 26.5–33)
MCHC RBC AUTO-ENTMCNC: 34 G/DL (ref 31.5–36.5)
MCV RBC AUTO: 94 FL (ref 78–100)
MICROALBUMIN UR-MCNC: <5 MG/L
MICROALBUMIN/CREAT UR: NORMAL MG/G CR (ref 0–17)
NONHDLC SERPL-MCNC: 125 MG/DL
PHOSPHATE SERPL-MCNC: 2.5 MG/DL (ref 2.5–4.5)
PLATELET # BLD AUTO: 215 10E9/L (ref 150–450)
POTASSIUM SERPL-SCNC: 4.2 MMOL/L (ref 3.4–5.3)
PSA SERPL-ACNC: 1.34 UG/L (ref 0–4)
RBC # BLD AUTO: 4.85 10E12/L (ref 4.4–5.9)
SODIUM SERPL-SCNC: 140 MMOL/L (ref 133–144)
TRIGL SERPL-MCNC: 89 MG/DL
TSH SERPL DL<=0.005 MIU/L-ACNC: 3.16 MU/L (ref 0.4–4)
WBC # BLD AUTO: 8.3 10E9/L (ref 4–11)

## 2019-03-21 PROCEDURE — 84443 ASSAY THYROID STIM HORMONE: CPT | Performed by: FAMILY MEDICINE

## 2019-03-21 PROCEDURE — G0103 PSA SCREENING: HCPCS | Performed by: FAMILY MEDICINE

## 2019-03-21 PROCEDURE — 83036 HEMOGLOBIN GLYCOSYLATED A1C: CPT | Performed by: FAMILY MEDICINE

## 2019-03-21 PROCEDURE — 80069 RENAL FUNCTION PANEL: CPT | Performed by: FAMILY MEDICINE

## 2019-03-21 PROCEDURE — 99396 PREV VISIT EST AGE 40-64: CPT | Performed by: FAMILY MEDICINE

## 2019-03-21 PROCEDURE — 84460 ALANINE AMINO (ALT) (SGPT): CPT | Performed by: FAMILY MEDICINE

## 2019-03-21 PROCEDURE — 80061 LIPID PANEL: CPT | Performed by: FAMILY MEDICINE

## 2019-03-21 PROCEDURE — 99212 OFFICE O/P EST SF 10 MIN: CPT | Mod: 25 | Performed by: FAMILY MEDICINE

## 2019-03-21 PROCEDURE — 82043 UR ALBUMIN QUANTITATIVE: CPT | Performed by: FAMILY MEDICINE

## 2019-03-21 PROCEDURE — 36415 COLL VENOUS BLD VENIPUNCTURE: CPT | Performed by: FAMILY MEDICINE

## 2019-03-21 PROCEDURE — 85027 COMPLETE CBC AUTOMATED: CPT | Performed by: FAMILY MEDICINE

## 2019-03-21 RX ORDER — LEVOTHYROXINE SODIUM 100 UG/1
TABLET ORAL
Qty: 90 TABLET | Refills: 3 | Status: SHIPPED | OUTPATIENT
Start: 2019-03-21 | End: 2020-04-03

## 2019-03-21 RX ORDER — TERAZOSIN 2 MG/1
2 CAPSULE ORAL AT BEDTIME
Qty: 90 CAPSULE | Refills: 3 | Status: SHIPPED | OUTPATIENT
Start: 2019-03-21 | End: 2020-04-03

## 2019-03-21 ASSESSMENT — MIFFLIN-ST. JEOR: SCORE: 1518.6

## 2019-03-21 ASSESSMENT — PAIN SCALES - GENERAL: PAINLEVEL: NO PAIN (0)

## 2019-03-21 NOTE — PROGRESS NOTES
SUBJECTIVE:   CC: Robert Carolina is an 63 year old male who presents for preventative health visit.     Physical   Annual:     Getting at least 3 servings of Calcium per day:  Yes    Bi-annual eye exam:  Yes    Dental care twice a year:  Yes    Sleep apnea or symptoms of sleep apnea:  None    Diet:  Regular (no restrictions)    Frequency of exercise:  None    Taking medications regularly:  Yes    Medication side effects:  None    Additional concerns today:  YES          Leg Pain- more of a tingling sensation    Onset: 1-2 years    Description:   Location: bilateral lower legs and feet  Character: swelling    Intensity: mild    Progression of Symptoms: intermittent    Accompanying Signs & Symptoms:  Other symptoms: tingling, sometimes swelling in lower extremities.    History:   Previous similar pain: no       Precipitating factors:   Trauma or overuse: YES- on feet a lot    Alleviating factors:  Improved by: Resting, elevating    Therapies Tried and outcome: Resting, elevating        Today's PHQ-2 Score:   PHQ-2 ( 1999 Pfizer) 1/18/2018   Q1: Little interest or pleasure in doing things 0   Q2: Feeling down, depressed or hopeless 0   PHQ-2 Score 0       Abuse: Current or Past(Physical, Sexual or Emotional)- No  Do you feel safe in your environment? Yes    Social History     Tobacco Use     Smoking status: Never Smoker     Smokeless tobacco: Never Used   Substance Use Topics     Alcohol use: Yes     Comment: 5 per week     No flowsheet data found.No flowsheet data found.    Last PSA:   PSA   Date Value Ref Range Status   01/18/2018 1.25 0 - 4 ug/L Final     Comment:     Assay Method:  Chemiluminescence using Siemens Vista analyzer       Reviewed orders with patient. Reviewed health maintenance and updated orders accordingly - Yes  Labs reviewed in EPIC  BP Readings from Last 3 Encounters:   03/21/19 139/83   08/21/18 145/79   03/14/18 122/70    Wt Readings from Last 3 Encounters:   03/21/19 73 kg (161 lb)   08/21/18  72.6 kg (160 lb)   02/13/18 72.6 kg (160 lb)                  Patient Active Problem List   Diagnosis     CARDIOVASCULAR SCREENING; LDL GOAL LESS THAN 160     Nasal polyposis     Chronic sinusitis     Advanced directives, counseling/discussion     SCOTT (obstructive sleep apnea)-Mild (AHI 6)     Nevus of multiple sites of trunk     Hypothyroidism due to Hashimoto's thyroiditis     Elevated blood sugar     Nocturia     Dupuytren's contracture of both hands     Personal history of gastric ulcer     Nocturia associated with benign prostatic hyperplasia     Hypertension, goal below 140/90     Family hx of prostate cancer     Past Surgical History:   Procedure Laterality Date     ARTHROSCOPY KNEE RT/LT      LT - Meniscus injury, Dr. Miguel LITTLEJOHN NONSPECIFIC PROCEDURE      Stapled Stomach - due to ulcer     C ORAL SURGERY PROCEDURE      [place] teeth     OPTICAL TRACKING SYSTEM ENDOSCOPIC SINUS SURGERY  7/21/2011    Procedure:OPTICAL TRACKING SYSTEM ENDOSCOPIC SINUS SURGERY; image guided endoscopic septoplasty, bilateral ethmoidectomy, bilateral maxillary antrostomy, polypectomy,bilateral sphenoid sinusotomies, bilateral frontal sinusotomies; Surgeon:PAT HOLDER; Location:[facility] OR     SINUS SURGERY  2009       Social History     Tobacco Use     Smoking status: Never Smoker     Smokeless tobacco: Never Used   Substance Use Topics     Alcohol use: Yes     Comment: 5 per week     Family History   Problem Relation Age of Onset     Diabetes Mother      Skin Cancer Father      Prostate Cancer Brother      Melanoma No family hx of          Current Outpatient Medications   Medication Sig Dispense Refill     ALAVERT OR once daily       Bismuth Subsalicylate (PEPTO-BISMOL PO) Take  by mouth as needed.       COMPOUNDED NON-CONTROLLED SUBSTANCE (CMPD RX) - PHARMACY TO MIX COMPOUNDED MEDICATION Apply 20 mLs into each nare 2 times daily Itraconazole 0.01% solution 2000 mL 6     COMPOUNDED NON-CONTROLLED SUBSTANCE (CMPD RX) -  PHARMACY TO MIX COMPOUNDED MEDICATION Spray 20 mLs in nostril 2 times daily Gent/Dex Nasal Irrigation 2000 mL 11     levothyroxine (SYNTHROID/LEVOTHROID) 100 MCG tablet TAKE 1 TABLET(100 MCG) BY MOUTH DAILY 90 tablet 3     OCUVITE OR once daily       omeprazole (PRILOSEC) 40 MG DR capsule Take 1 capsule (40 mg) by mouth daily Take 30-60 minutes before a meal. 90 capsule 1     terazosin (HYTRIN) 2 MG capsule Take 1 capsule (2 mg) by mouth At Bedtime 90 capsule 3     TYLENOL 325 MG PO TABS 4 time per week       Allergies   Allergen Reactions     Cefuroxime      Other reaction(s): Stomach Upset     Ceftin GI Disturbance     Oxycodone-Acetaminophen Rash     Recent Labs   Lab Test 03/14/18  0842 01/18/18  0811 01/09/17  0853 01/11/16  1449 08/21/15  0910 11/02/14  0949  11/01/12  1716   A1C  --  6.0 5.9 6.3*  --   --   --   --    LDL  --   --  128*  --  115 100  --   --    HDL  --   --  63  --  50 59  --   --    TRIG  --   --  78  --  70 81  --   --    ALT  --   --   --   --   --   --   --  32   CR  --  0.88 1.07 0.92  --   --   --  0.79   GFRESTIMATED  --  88 70 83  --   --   --  >90   GFRESTBLACK  --  >90 85 >90  African American GFR Calc    --   --   --  >90   POTASSIUM  --  3.7 4.3 4.2  --   --   --  4.0   TSH 2.38 6.70* 6.03* 3.33  --  3.09   < > 18.50*    < > = values in this interval not displayed.        Reviewed and updated as needed this visit by clinical staff  Tobacco  Allergies  Meds         Reviewed and updated as needed this visit by Provider            Review of Systems  CONSTITUTIONAL: NEGATIVE for fever, chills, change in weight  INTEGUMENTARY/SKIN: NEGATIVE for worrisome rashes, moles or lesions  EYES: NEGATIVE for vision changes or irritation  ENT: NEGATIVE for ear, mouth and throat problems  RESP: NEGATIVE for significant cough or SOB  CV: NEGATIVE for chest pain, palpitations or peripheral edema  GI: NEGATIVE for nausea, abdominal pain, heartburn, or change in bowel habits   male: negative for  "dysuria, hematuria, decreased urinary stream, erectile dysfunction, urethral discharge  MUSCULOSKELETAL: NEGATIVE for significant arthralgias or myalgia except as noted in legs and feet  NEURO: NEGATIVE for weakness, dizziness or paresthesias  PSYCHIATRIC: NEGATIVE for changes in mood or affect    OBJECTIVE:   /83 (BP Location: Right arm, Patient Position: Chair, Cuff Size: Adult Regular)   Pulse 78   Temp 98.4  F (36.9  C) (Oral)   Resp 18   Ht 1.765 m (5' 9.5\")   Wt 73 kg (161 lb)   SpO2 99%   BMI 23.43 kg/m      Physical Exam  GENERAL APPEARANCE: healthy, alert and no distress  EYES: Eyes grossly normal to inspection, PERRL and conjunctivae and sclerae normal  HENT: ear canals and TM's normal, nose and mouth without ulcers or lesions, oropharynx clear and oral mucous membranes moist  NECK: no adenopathy, no asymmetry, masses, or scars and thyroid normal to palpation  RESP: lungs clear to auscultation - no rales, rhonchi or wheezes  CV: regular rates and rhythm, normal S1 S2, no S3 or S4, no murmur, click or rub, no peripheral edema and peripheral pulses strong  ABDOMEN: soft, nontender, no hepatosplenomegaly, no masses and bowel sounds normal  MS: no musculoskeletal defects are noted and gait is age appropriate without ataxia  SKIN: no suspicious lesions or rashes  NEURO: Normal strength and tone, sensory exam grossly normal, mentation intact and speech normal  PSYCH: mentation appears normal and affect normal/bright     Diagnostic Test Results:  Results for orders placed or performed in visit on 03/14/18   **TSH with free T4 reflex FUTURE anytime   Result Value Ref Range    TSH 2.38 0.40 - 4.00 mU/L       ASSESSMENT/PLAN:       ICD-10-CM    1. Routine history and physical examination of adult Z00.00 Doing well today with mild leg pain. Labs and refills done   2. Screen for colon cancer Z12.11 Fecal colorectal cancer screen FIT - Future (S+30), declines colonoscopy   3. Screening for HIV (human " "immunodeficiency virus) Z11.4 Declined.    4. Nocturia associated with benign prostatic hyperplasia N40.1 terazosin (HYTRIN) 2 MG capsule- working well and only up once at night    R35.1    5. Personal history of gastric ulcer Z87.19 Stopped aspirin    6. Hypothyroidism due to Hashimoto's thyroiditis E03.8 levothyroxine (SYNTHROID/LEVOTHROID) 100 MCG tablet daily  TSH with Free T4 reflex    E06.3    7. Hypertension, goal below 140/90- well controlled on medications  I10 Renal panel     Albumin Random Urine Quantitative with Creat Ratio   8. SCOTT (obstructive sleep apnea)-Mild (AHI 6) G47.33 Stable    9. Pain in both lower legs M79.661 More of a tingling sensation and intermittent. Exam normal    M79.662    10. Family hx of prostate cancer Z80.42 Prostate spec antigen screen- yearly recommended.    11. Elevated blood sugar- strong family history  R73.9 CBC with platelets     Hemoglobin A1c   12. Hyperlipidemia LDL goal <130 E78.5 Lipid panel reflex to direct LDL Fasting     ALT       COUNSELING:   Reviewed preventive health counseling, as reflected in patient instructions       Regular exercise       Healthy diet/nutrition    BP Readings from Last 1 Encounters:   03/21/19 139/83     Estimated body mass index is 23.43 kg/m  as calculated from the following:    Height as of this encounter: 1.765 m (5' 9.5\").    Weight as of this encounter: 73 kg (161 lb).           reports that  has never smoked. he has never used smokeless tobacco.      Counseling Resources:  ATP IV Guidelines  Pooled Cohorts Equation Calculator  FRAX Risk Assessment  ICSI Preventive Guidelines  Dietary Guidelines for Americans, 2010  USDA's MyPlate  ASA Prophylaxis  Lung CA Screening    Renee Byrd MD  [facility]  "

## 2019-03-21 NOTE — PATIENT INSTRUCTIONS
At [facility], we strive to deliver an exceptional experience to you, every time we see you.  If you receive a survey in the mail, please send us back your thoughts. We really do value your feedback.    Based on your medical history, these are the current health maintenance/preventive care services that you are due for (some may have been done at this visit.)  Health Maintenance Due   Topic Date Due     HIV SCREEN (SYSTEM ASSIGNED)  09/18/1972     ZOSTER IMMUNIZATION (2 of 3) 06/15/2015     ADVANCE DIRECTIVE PLANNING Q5 YRS  11/01/2017     COLON CANCER SCREEN (SYSTEM ASSIGNED)  05/12/2018     INFLUENZA VACCINE (1) 09/01/2018     PREVENTIVE CARE VISIT  01/18/2019     PHQ-2 Q1 YR  01/18/2019         Suggested websites for health information:  Www.SalesWarp : Up to date and easily searchable information on multiple topics.  Www.ChromoTek.gov : medication info, interactive tutorials, watch real surgeries online  Www.familydoctor.org : good info from the Academy of Family Physicians  Www.cdc.gov : public health info, travel advisories, epidemics (H1N1)  Www.aap.org : children's health info, normal development, vaccinations  Www.health.[facility].[place].us : [place] dept of health, public health issues in [place], N1N1    Your care team:                            Family Medicine Internal Medicine   MD Kadeem Pruitt MD Shantel Branch-Fleming, MD Katya Georgiev PA-C Nam Ho, MD Pediatrics   EDENILSON Landin, MD Margie Flores CNP, MD Deborah Mielke, MD Kim Thein, APRN CNP      Clinic hours: Monday - Thursday 7 am-7 pm; Fridays 7 am-5 pm.   Urgent care: Monday - Friday 11 am-9 pm; Saturday and Sunday 9 am-5 pm.  Pharmacy : Monday -Thursday 8 am-8 pm; Friday 8 am-6 pm; Saturday and Sunday 9 am-5 pm.     Clinic: [phone]   Pharmacy: [phone]      Preventive Health Recommendations  Male Ages 50 - 64    Yearly exam:              See your health care provider every year in order to  o   Review health changes.   o   Discuss preventive care.    o   Review your medicines if your doctor has prescribed any.     Have a cholesterol test every 5 years, or more frequently if you are at risk for high cholesterol/heart disease.     Have a diabetes test (fasting glucose) every three years. If you are at risk for diabetes, you should have this test more often.     Have a colonoscopy at age 50, or have a yearly FIT test (stool test). These exams will check for colon cancer.      Talk with your health care provider about whether or not a prostate cancer screening test (PSA) is right for you.    You should be tested each year for STDs (sexually transmitted diseases), if you re at risk.     Shots: Get a flu shot each year. Get a tetanus shot every 10 years.     Nutrition:    Eat at least 5 servings of fruits and vegetables daily.     Eat whole-grain bread, whole-wheat pasta and brown rice instead of white grains and rice.     Get adequate Calcium and Vitamin D.     Lifestyle    Exercise for at least 150 minutes a week (30 minutes a day, 5 days a week). This will help you control your weight and prevent disease.     Limit alcohol to one drink per day.     No smoking.     Wear sunscreen to prevent skin cancer.     See your dentist every six months for an exam and cleaning.     See your eye doctor every 1 to 2 years.

## 2019-03-22 DIAGNOSIS — Z12.11 SCREEN FOR COLON CANCER: ICD-10-CM

## 2019-03-22 LAB — HEMOCCULT STL QL IA: NEGATIVE

## 2019-03-22 PROCEDURE — 82274 ASSAY TEST FOR BLOOD FECAL: CPT | Performed by: FAMILY MEDICINE

## 2019-03-22 NOTE — LETTER
[address]  [address]  [phone]    March 25, 2019      Robert Carolina  [address] [facility] [address]        Dear Robert Carolina,     The lab results from the most recent visit are all normal or in a good range.     There was no blood in the stool. Recheck in 1 year.     Please call me if you have any questions about your condition or care.     Sincerely,     Renee judge MD

## 2019-03-23 NOTE — RESULT ENCOUNTER NOTE
Dear Robert Carolina,    The lab results from the most recent visit are all normal or in a good range.     There was no blood in the stool. Recheck in 1 year.    Please call me if you have any questions about your condition or care.     Sincerely,    Renee judge MD

## 2019-03-23 NOTE — RESULT ENCOUNTER NOTE
Dear Robert    Your test results are attached. I am happy to let you know that they are stable.    The blood sugar is normal and you do not have diabetes, but does show pre-diabetes. The kidneys are healthy. The thyroid test is normal. The cholesterol looks good. The test for prostate cancer was normal and shows low risk. We can recheck labs in 1 year.     Please contact me by [employer] if you have any questions about your labs or management.    Renee Byrd MD

## 2019-04-15 DIAGNOSIS — E06.3 CHRONIC LYMPHOCYTIC THYROIDITIS: ICD-10-CM

## 2019-04-15 NOTE — TELEPHONE ENCOUNTER
Requested Prescriptions       Rx sent to [facility]:    levothyroxine (SYNTHROID/LEVOTHROID) 100 MCG tablet 90 tablet 3 3/21/2019

## 2019-04-16 RX ORDER — LEVOTHYROXINE SODIUM 100 UG/1
TABLET ORAL
Qty: 90 TABLET | Refills: 0
Start: 2019-04-16

## 2019-04-16 NOTE — TELEPHONE ENCOUNTER
90 day supply with 3 refills sent on 3/21/19. Should have refills on file at pharmacy. Too soon to refill.         Asa Minaya RN, BSN

## 2019-05-22 DIAGNOSIS — R35.1 NOCTURIA ASSOCIATED WITH BENIGN PROSTATIC HYPERPLASIA: ICD-10-CM

## 2019-05-22 DIAGNOSIS — N40.1 NOCTURIA ASSOCIATED WITH BENIGN PROSTATIC HYPERPLASIA: ICD-10-CM

## 2019-05-23 RX ORDER — TERAZOSIN 2 MG/1
CAPSULE ORAL
Qty: 90 CAPSULE | Refills: 0 | OUTPATIENT
Start: 2019-05-23

## 2019-10-27 ENCOUNTER — TELEPHONE (OUTPATIENT)
Dept: OTOLARYNGOLOGY | Facility: CLINIC | Age: 64
End: 2019-10-27

## 2019-10-27 DIAGNOSIS — J32.4 CHRONIC PANSINUSITIS: ICD-10-CM

## 2019-10-27 DIAGNOSIS — J33.9 NASAL POLYPOSIS: ICD-10-CM

## 2019-10-27 NOTE — TELEPHONE ENCOUNTER
Patient is requesting refill for predniSONE (DELTASONE) 10 MG tablet and clarithromycin (BIAXIN) 500 MG tablet (Discontinued).          Jamie Faarax  [facility]

## 2019-10-28 RX ORDER — PREDNISONE 10 MG/1
10 TABLET ORAL DAILY
Qty: 7 TABLET | Refills: 1 | Status: SHIPPED | OUTPATIENT
Start: 2019-10-28 | End: 2020-03-19

## 2019-10-28 RX ORDER — CLARITHROMYCIN 500 MG
500 TABLET ORAL 2 TIMES DAILY
Qty: 20 TABLET | Refills: 3 | Status: SHIPPED | OUTPATIENT
Start: 2019-10-28 | End: 2020-03-19

## 2020-01-21 ENCOUNTER — TELEPHONE (OUTPATIENT)
Dept: FAMILY MEDICINE | Facility: CLINIC | Age: 65
End: 2020-01-21

## 2020-01-21 NOTE — TELEPHONE ENCOUNTER
.Reason for call:  Medication     Name of the pharmacy and phone number for the current request: [facility] pharmacy [address] [place] [address]    Name of the medication requested: gentimic 160mg and dexameth 120mg    Other request: pt needs pre-authorization for medication. He is out.    Phone number to reach patient:  Home number on file [phone] (home)    Best Time:  Anytime    Can we leave a detailed message on this number?  YES

## 2020-01-22 NOTE — TELEPHONE ENCOUNTER
Please verify if patient's dose has increased or changed? Per pharmacy record, an order was recently delivered to patient on 01/16 for a 25 day supply. The claim is also paid through insurance (copay is $12). If patient did not receive the medication/ delivery, he can call [place] pharmacy. If the dose has increased and patient is out, please add a new rx on file and route encounter to pa team to submit a pa.

## 2020-01-27 ENCOUNTER — TELEPHONE (OUTPATIENT)
Dept: OTOLARYNGOLOGY | Facility: CLINIC | Age: 65
End: 2020-01-27

## 2020-01-27 NOTE — TELEPHONE ENCOUNTER
Reason for Call: Call back.    Detailed comments: Pt stated that his insurance company is still waiting on the pre authorization for the COMPOUNDED medication mix.    Phone Number Patient can be reached at: Home number on file [phone].  [employer] Phone Number: [phone].    Best Time: Anytime.    Can we leave a detailed message on this number? YES    Call taken on 1/27/2020 at 1:44 PM by Mila Armijo

## 2020-01-27 NOTE — TELEPHONE ENCOUNTER
Attempted to call pt to gather more information on medication. Previous note from fellow co worker notes pt was sent RX on the 16th of this month For a 25day supply. Line was busy will try again later.    Beth Clarke RN Specialty Triage 1/27/2020 2:13 PM

## 2020-01-28 NOTE — TELEPHONE ENCOUNTER
Called pt and spoke to pt. PA for   COMPOUNDED NON-CONTROLLED SUBSTANCE (CMPD RX) - PHARMACY TO MIX COMPOUNDED MEDICATION 2000 mL 11 5/20/2019  No   Sig - Route: Apply 20 mLs into each nare 2 times daily Gent/Dex Nasal Irrigation - Each Nare   Sent to pharmacy as: COMPOUNDED NON-CONTROLLED SUBSTANCE (CMPD RX) - PHARMACY TO MIX COMPOUNDED MEDICATION   Class: E-Prescribe   Notes to Pharmacy: Gentamicin/Dexamethasone 160/120mg/liter     Is needed.   Beth Clarke RN Specialty Triage 1/28/2020 12:40 PM

## 2020-01-28 NOTE — TELEPHONE ENCOUNTER
PA Initiation    Medication: Compound Gent/Dex  Insurance Company: [employer] - Phone [phone] Fax [phone]  Pharmacy Filling the Rx: [facility] - [place], [place] - [address] [address]  Filling Pharmacy Phone: [phone]  Filling Pharmacy Fax:    Start Date: 1/28/2020    Central Prior Authorization Team   Phone: [phone]

## 2020-01-30 NOTE — TELEPHONE ENCOUNTER
Prior Authorization Not Needed per Insurance    Medication: Compound Gent/Dex  Insurance Company: [place] [employer] - Phone [phone] Fax [phone]  Pharmacy Filling the Rx: [facility] - [place], [place] - [address]   Pharmacy Notified:  Pharmacy actually has a paid claim and already shipped this out to patient on 1/15/2020

## 2020-01-30 NOTE — TELEPHONE ENCOUNTER
Prior Authorization Follow Up    Called [facility] - Phone [phone] Fax [phone] to check status of Compound Gent/Dex. Request is in review. Insurance will have a determination by today as it is in the double check status of review.

## 2020-02-24 ENCOUNTER — HEALTH MAINTENANCE LETTER (OUTPATIENT)
Age: 65
End: 2020-02-24

## 2020-03-18 DIAGNOSIS — J33.9 NASAL POLYPOSIS: ICD-10-CM

## 2020-03-18 DIAGNOSIS — J32.4 CHRONIC PANSINUSITIS: ICD-10-CM

## 2020-03-19 RX ORDER — PREDNISONE 10 MG/1
10 TABLET ORAL DAILY
Qty: 7 TABLET | Refills: 1 | Status: SHIPPED | OUTPATIENT
Start: 2020-03-19 | End: 2020-05-11

## 2020-03-19 RX ORDER — CLARITHROMYCIN 500 MG
500 TABLET ORAL 2 TIMES DAILY
Qty: 20 TABLET | Refills: 3 | Status: SHIPPED | OUTPATIENT
Start: 2020-03-19 | End: 2020-05-11

## 2020-03-19 NOTE — TELEPHONE ENCOUNTER
Routing refill request to provider for review/approval because:  Drug not on the G refill protocol     Requested Prescriptions   Pending Prescriptions Disp Refills     clarithromycin (BIAXIN) 500 MG tablet 20 tablet 3     Sig: Take 1 tablet (500 mg) by mouth 2 times daily       There is no refill protocol information for this order        predniSONE (DELTASONE) 10 MG tablet 7 tablet 1     Sig: Take 1 tablet (10 mg) by mouth daily       There is no refill protocol information for this order        Katherin Doan RN

## 2020-04-03 ENCOUNTER — MYC REFILL (OUTPATIENT)
Dept: FAMILY MEDICINE | Facility: CLINIC | Age: 65
End: 2020-04-03

## 2020-04-03 DIAGNOSIS — N40.1 NOCTURIA ASSOCIATED WITH BENIGN PROSTATIC HYPERPLASIA: ICD-10-CM

## 2020-04-03 DIAGNOSIS — E06.3 HYPOTHYROIDISM DUE TO HASHIMOTO'S THYROIDITIS: ICD-10-CM

## 2020-04-03 DIAGNOSIS — R35.1 NOCTURIA ASSOCIATED WITH BENIGN PROSTATIC HYPERPLASIA: ICD-10-CM

## 2020-04-03 RX ORDER — TERAZOSIN 2 MG/1
2 CAPSULE ORAL AT BEDTIME
Qty: 90 CAPSULE | Refills: 0 | Status: SHIPPED | OUTPATIENT
Start: 2020-04-03 | End: 2020-05-11

## 2020-04-03 RX ORDER — LEVOTHYROXINE SODIUM 100 UG/1
TABLET ORAL
Qty: 90 TABLET | Refills: 0 | Status: SHIPPED | OUTPATIENT
Start: 2020-04-03 | End: 2020-05-11

## 2020-04-03 NOTE — TELEPHONE ENCOUNTER
Routing refill request to provider for review/approval because:  Patient needs to be seen because it has been more than 1 year since last office visit.    Asa Minaya RN, BSN, PHN

## 2020-04-03 NOTE — TELEPHONE ENCOUNTER
"Requested Prescriptions   Pending Prescriptions Disp Refills     terazosin (HYTRIN) 2 MG capsule  Last Written Prescription Date:  03/21/19  Last Fill Quantity: 90,  # refills: 3   Last Office Visit with JENNIFER, CHRISTIAN or [facility] prescribing provider:  03/21/19-[place]   Future Office Visit:    90 capsule 3     Sig: Take 1 capsule (2 mg) by mouth At Bedtime       Alpha Blockers Failed - 4/3/2020  1:19 PM        Failed - Blood pressure under 140/90 in past 12 months     BP Readings from Last 3 Encounters:   03/21/19 139/83   08/21/18 145/79   03/14/18 122/70                 Failed - Recent (12 mo) or future (30 days) visit within the authorizing provider's specialty     Patient has had an office visit with the authorizing provider or a provider within the authorizing providers department within the previous 12 mos or has a future within next 30 days. See \"Patient Info\" tab in inbasket, or \"Choose Columns\" in Meds & Orders section of the refill encounter.              Passed - Patient does not have Tadalafil, Vardenafil, or Sildenafil on their medication list        Passed - Medication is active on med list        Passed - Patient is 18 years of age or older       Antiadrenergic Antihypertensives Failed - 4/3/2020  1:19 PM        Failed - Blood pressure less than 140/90 in past 6 months     BP Readings from Last 3 Encounters:   03/21/19 139/83   08/21/18 145/79   03/14/18 122/70                 Failed - Normal serum creatinine on file in past 12 months     Recent Labs   Lab Test 03/21/19  0737   CR 0.85       Ok to refill medication if creatinine is low          Failed - Recent (6 mo) or future (30 days) visit within the authorizing provider's specialty     Patient had office visit in the last 6 months or has a visit in the next 30 days with authorizing provider or within the authorizing provider's specialty.  See \"Patient Info\" tab in inbasket, or \"Choose Columns\" in Meds & Orders section of the refill encounter.           " " Passed - Medication is active on med list        Passed - Patient is age 18 or older           levothyroxine (SYNTHROID/LEVOTHROID) 100 MCG tablet  Last Written Prescription Date:  03/21/19  Last Fill Quantity: 90,  # refills: 3   Last Office Visit with [facility] or [facility] prescribing provider:  03/21/19   Future Office Visit:    90 tablet 3     Sig: TAKE 1 TABLET(100 MCG) BY MOUTH DAILY       Thyroid Protocol Failed - 4/3/2020  1:19 PM        Failed - Recent (12 mo) or future (30 days) visit within the authorizing provider's specialty     Patient has had an office visit with the authorizing provider or a provider within the authorizing providers department within the previous 12 mos or has a future within next 30 days. See \"Patient Info\" tab in inbasket, or \"Choose Columns\" in Meds & Orders section of the refill encounter.              Failed - Normal TSH on file in past 12 months     Recent Labs   Lab Test 03/21/19  0737   TSH 3.16              Passed - Patient is 12 years or older        Passed - Medication is active on med list             "

## 2020-04-03 NOTE — TELEPHONE ENCOUNTER
Please schedule a phone visit with me for follow up at this time. Prescription filled one time.  Renee Byrd MD

## 2020-05-08 ENCOUNTER — TELEPHONE (OUTPATIENT)
Dept: FAMILY MEDICINE | Facility: CLINIC | Age: 65
End: 2020-05-08

## 2020-05-08 NOTE — TELEPHONE ENCOUNTER
Team, please return call to patient and inform he needs to schedule virtual visit with provider to discuss request to increase Terazosin dose.  Has not been seen in over 1 year. Can push out annual physical till patients are being allowed back in office (July or later) but needs telephone or video visit for medication adjustment and refills.  Please assist in scheduling.    Elicia Daniels RN  [facility]

## 2020-05-08 NOTE — TELEPHONE ENCOUNTER
Reason for call:  Other   Patient called regarding (reason for call): prescription  Additional comments: patient knows he is due for physical, he will wait until we are back and running with those appts  But he will be needing refills and he would like to increase his terazosin (HYTRIN) 2 MG capsule   To 4mg to sohail guevara    Call to advise if he needs labs or anything in the meantime    Phone number to reach patient:  Home number on file [phone] (home)    Best Time:  any    Can we leave a detailed message on this number?  YES    Travel screening: Not Applicable

## 2020-05-11 ENCOUNTER — VIRTUAL VISIT (OUTPATIENT)
Dept: FAMILY MEDICINE | Facility: CLINIC | Age: 65
End: 2020-05-11
Payer: MEDICARE

## 2020-05-11 DIAGNOSIS — I10 HYPERTENSION, GOAL BELOW 140/90: ICD-10-CM

## 2020-05-11 DIAGNOSIS — Z12.5 SCREENING FOR PROSTATE CANCER: ICD-10-CM

## 2020-05-11 DIAGNOSIS — E11.9 TYPE 2 DIABETES MELLITUS WITHOUT COMPLICATION, WITHOUT LONG-TERM CURRENT USE OF INSULIN (H): ICD-10-CM

## 2020-05-11 DIAGNOSIS — Z13.6 CARDIOVASCULAR SCREENING; LDL GOAL LESS THAN 160: Primary | ICD-10-CM

## 2020-05-11 DIAGNOSIS — R35.1 NOCTURIA ASSOCIATED WITH BENIGN PROSTATIC HYPERPLASIA: ICD-10-CM

## 2020-05-11 DIAGNOSIS — N40.1 NOCTURIA ASSOCIATED WITH BENIGN PROSTATIC HYPERPLASIA: ICD-10-CM

## 2020-05-11 DIAGNOSIS — R73.9 ELEVATED BLOOD SUGAR: ICD-10-CM

## 2020-05-11 DIAGNOSIS — E06.3 HYPOTHYROIDISM DUE TO HASHIMOTO'S THYROIDITIS: ICD-10-CM

## 2020-05-11 PROCEDURE — 99214 OFFICE O/P EST MOD 30 MIN: CPT | Mod: TEL | Performed by: FAMILY MEDICINE

## 2020-05-11 RX ORDER — TERAZOSIN 2 MG/1
4 CAPSULE ORAL AT BEDTIME
Qty: 180 CAPSULE | Refills: 1 | Status: SHIPPED | OUTPATIENT
Start: 2020-05-11 | End: 2020-10-11

## 2020-05-11 RX ORDER — LEVOTHYROXINE SODIUM 100 UG/1
TABLET ORAL
Qty: 90 TABLET | Refills: 1 | Status: SHIPPED | OUTPATIENT
Start: 2020-05-11 | End: 2020-10-11

## 2020-05-11 NOTE — PROGRESS NOTES
"Robert Carolina is a 64 year old male who is being evaluated via a billable telephone visit.      The patient has been notified of following:     \"This telephone visit will be conducted via a call between you and your physician/provider. We have found that certain health care needs can be provided without the need for a physical exam.  This service lets us provide the care you need with a short phone conversation.  If a prescription is necessary we can send it directly to your pharmacy.  If lab work is needed we can place an order for that and you can then stop by our lab to have the test done at a later time.    Telephone visits are billed at different rates depending on your insurance coverage. During this emergency period, for some insurers they may be billed the same as an in-person visit.  Please reach out to your insurance provider with any questions.    If during the course of the call the physician/provider feels a telephone visit is not appropriate, you will not be charged for this service.\"    Patient has given verbal consent for Telephone visit?  Yes    What phone number would you like to be contacted at? [phone]    How would you like to obtain your AVS? Brennanhart    Talat     Robert Carolina is a 64 year old male who presents to clinic today for the following health issues:    HPI         Pre-diabetes Follow-up      How often are you checking your blood sugar? Not at all    What concerns do you have today about your diabetes? None     Do you have any of these symptoms? (Select all that apply)  No numbness or tingling in feet.  No redness, sores or blisters on feet.  No complaints of excessive thirst.  No reports of blurry vision.  No significant changes to weight.      BP Readings from Last 2 Encounters:   03/21/19 139/83   08/21/18 145/79     Hemoglobin A1C (%)   Date Value   03/21/2019 6.2 (H)   01/18/2018 6.0     LDL Cholesterol Calculated (mg/dL)   Date Value   03/21/2019 107 (H)   01/09/2017 " 128 (H)         Hyperlipidemia Follow-Up      Are you regularly taking any medication or supplement to lower your cholesterol?   No    Are you having muscle aches or other side effects that you think could be caused by your cholesterol lowering medication?  No     Hypertension Follow-up      Do you check your blood pressure regularly outside of the clinic? No     Are you following a low salt diet? Yes    Are your blood pressures ever more than 140 on the top number (systolic) OR more   than 90 on the bottom number (diastolic), for example 140/90? No   Some dependent edema in legs at end of day, worse in warm weather during the summer.   Nocturia 1-2 times at night and stable but would like to increase the terazosin because of COVID 19 risks with high blood pressure      Hypothyroidism Follow-up      Since last visit, patient describes the following symptoms: Weight stable, no hair loss, no skin changes, no constipation, no loose stools    Strong family history of prostate cancer and requests yearly PSA. Discussed drive up testing option.     Patient Active Problem List   Diagnosis     CARDIOVASCULAR SCREENING; LDL GOAL LESS THAN 160     Nasal polyposis     Chronic sinusitis     Advanced directives, counseling/discussion     SCOTT (obstructive sleep apnea)-Mild (AHI 6)     Nevus of multiple sites of trunk     Hypothyroidism due to Hashimoto's thyroiditis     Elevated blood sugar     Dupuytren's contracture of both hands     Personal history of gastric ulcer     Nocturia associated with benign prostatic hyperplasia     Hypertension, goal below 140/90     Family hx of prostate cancer     Past Surgical History:   Procedure Laterality Date     ARTHROSCOPY KNEE RT/LT      LT - Meniscus injury, Dr. Miguel LITTLEJOHN NONSPECIFIC PROCEDURE      Stapled Stomach - due to ulcer     C ORAL SURGERY PROCEDURE      [place] teeth     OPTICAL TRACKING SYSTEM ENDOSCOPIC SINUS SURGERY  7/21/2011    Procedure:OPTICAL TRACKING SYSTEM  ENDOSCOPIC SINUS SURGERY; image guided endoscopic septoplasty, bilateral ethmoidectomy, bilateral maxillary antrostomy, polypectomy,bilateral sphenoid sinusotomies, bilateral frontal sinusotomies; Surgeon:PAT HOLDER; Location:[facility] OR     SINUS SURGERY  2009       Social History     Tobacco Use     Smoking status: Never Smoker     Smokeless tobacco: Never Used   Substance Use Topics     Alcohol use: Yes     Comment: 5 per week     Family History   Problem Relation Age of Onset     Diabetes Mother      Skin Cancer Father      Prostate Cancer Brother      Melanoma No family hx of          Current Outpatient Medications   Medication Sig Dispense Refill     ALAVERT OR once daily       Bismuth Subsalicylate (PEPTO-BISMOL PO) Take  by mouth as needed.       COMPOUNDED NON-CONTROLLED SUBSTANCE (CMPD RX) - PHARMACY TO MIX COMPOUNDED MEDICATION Apply 20 mLs into each nare 2 times daily Gent/Dex Nasal Irrigation 2000 mL 11     COMPOUNDED NON-CONTROLLED SUBSTANCE (CMPD RX) - PHARMACY TO MIX COMPOUNDED MEDICATION Apply 20 mLs into each nare 2 times daily Itraconazole 0.01% solution 2000 mL 6     levothyroxine (SYNTHROID/LEVOTHROID) 100 MCG tablet TAKE 1 TABLET(100 MCG) BY MOUTH DAILY 90 tablet 1     OCUVITE OR once daily       omeprazole (PRILOSEC) 40 MG DR capsule Take 1 capsule (40 mg) by mouth daily Take 30-60 minutes before a meal. 90 capsule 1     terazosin (HYTRIN) 2 MG capsule Take 2 capsules (4 mg) by mouth At Bedtime 180 capsule 1     TYLENOL 325 MG PO TABS 4 time per week       Allergies   Allergen Reactions     Cefuroxime      Other reaction(s): Stomach Upset     Ceftin GI Disturbance     Oxycodone-Acetaminophen Rash     Recent Labs   Lab Test 03/21/19  0737 03/14/18  0842 01/18/18  0811 01/09/17  0853  08/21/15  0910  11/01/12  1716   A1C 6.2*  --  6.0 5.9   < >  --   --   --    *  --   --  128*  --  115   < >  --    HDL 56  --   --  63  --  50   < >  --    TRIG 89  --   --  78  --  70   < >  --    ALT  34  --   --   --   --   --   --  32   CR 0.85  --  0.88 1.07   < >  --   --  0.79   GFRESTIMATED >90  --  88 70   < >  --   --  >90   GFRESTBLACK >90  --  >90 85   < >  --   --  >90   POTASSIUM 4.2  --  3.7 4.3   < >  --   --  4.0   TSH 3.16 2.38 6.70* 6.03*   < >  --    < > 18.50*    < > = values in this interval not displayed.      BP Readings from Last 3 Encounters:   03/21/19 139/83   08/21/18 145/79   03/14/18 122/70    Wt Readings from Last 3 Encounters:   03/21/19 73 kg (161 lb)   08/21/18 72.6 kg (160 lb)   02/13/18 72.6 kg (160 lb)                    Reviewed and updated as needed this visit by Provider         Review of Systems   Constitutional, HEENT, cardiovascular, pulmonary, gi and gu systems are negative, except as otherwise noted.       Objective   Reported vitals:  There were no vitals taken for this visit.     PSYCH: Alert and oriented times 3; coherent speech, normal   rate and volume, able to articulate logical thoughts, able   to abstract reason, no tangential thoughts, no hallucinations   or delusions  His affect is normal  RESP: No cough, no audible wheezing, able to talk in full sentences  Remainder of exam unable to be completed due to telephone visits    Diagnostic Test Results:  Labs reviewed in Epic  Orders Only on 03/22/2019   Component Date Value Ref Range Status     Occult Blood Scn FIT 03/22/2019 Negative  NEG^Negative Final              Assessment/Plan:  1. Nocturia associated with benign prostatic hyperplasia  Will increase dose due to persistent symptoms and desire to lower blood pressure   - terazosin (HYTRIN) 2 MG capsule; Take 2 capsules (4 mg) by mouth At Bedtime  Dispense: 180 capsule; Refill: 1    2. Hypothyroidism due to Hashimoto's thyroiditis  Stable, recheck level  - levothyroxine (SYNTHROID/LEVOTHROID) 100 MCG tablet; TAKE 1 TABLET(100 MCG) BY MOUTH DAILY  Dispense: 90 tablet; Refill: 1  - **TSH with free T4 reflex FUTURE anytime; Future    3. CARDIOVASCULAR SCREENING;  LDL GOAL LESS THAN 160  recheck  - Lipid panel reflex to direct LDL Fasting; Future    4. Elevated blood sugar  A1C 6.2 last year  - **A1C FUTURE anytime; Future    5. Hypertension, goal below 140/90  Well controlled on medications   - **Basic metabolic panel FUTURE anytime; Future  - **CBC with platelets FUTURE anytime; Future    6. Screening for prostate cancer  Yearly checks  - **Prostate spec antigen screen FUTURE anytime; Future    No follow-ups on file.      Phone call duration:  12 minutes    Renee Byrd MD

## 2020-05-11 NOTE — PATIENT INSTRUCTIONS
At [facility], we strive to deliver an exceptional experience to you, every time we see you.  If you receive a survey in the mail, please send us back your thoughts. We really do value your feedback.    Based on your medical history, these are the current health maintenance/preventive care services that you are due for (some may have been done at this visit.)  Health Maintenance Due   Topic Date Due     ZOSTER IMMUNIZATION (2 of 3) 06/15/2015     ADVANCE CARE PLANNING  11/01/2017     FALL RISK ASSESSMENT  09/18/2019     PNEUMOCOCCAL IMMUNIZATION 65+ LOW/MEDIUM RISK (1 of 2 - PCV13) 09/18/2019     AORTIC ANEURYSM SCREENING (SYSTEM ASSIGNED)  09/18/2019     PHQ-2  01/01/2020     MEDICARE ANNUAL WELLNESS VISIT  03/21/2020     COLORECTAL CANCER SCREENING  03/22/2020         Suggested websites for health information:  Www.Qiniu.[employer] : Up to date and easily searchable information on multiple topics.  Www.[employer]ppost.gov : medication info, interactive tutorials, watch real surgeries online  Www.familydoctor.org : good info from the Academy of Family Physicians  Www.cdc.gov : public health info, travel advisories, epidemics (H1N1)  Www.aap.org : children's health info, normal development, vaccinations  Www.health.[facility].[place].us : [place] dept of health, public health issues in [place], N1N1    Your care team:                            Family Medicine Internal Medicine   MD Kadeem Pruitt MD Shantel Branch-Fleming, MD Katya Georgiev PA-C Nam Ho, MD Pediatrics   EDENILSON Landin, TAYLOR WESTFALL CNP   MD Margie Merritt MD Deborah Mielke, MD Kim Thein, APRCARMELITA VAZQUEZ      Clinic hours: Monday - Thursday 7 am-7 pm; Fridays 7 am-5 pm.   Urgent care: Monday - Friday 11 am-9 pm; Saturday and Sunday 9 am-5 pm.  Pharmacy : Monday -Thursday 8 am-8 pm; Friday 8 am-6 pm; Saturday and Sunday 9 am-5 pm.     Clinic: [phone]   Pharmacy: [phone]  [phone]    Patient Education     BPH (Enlarged Prostate)  The prostate is a gland at the base of the bladder. As some men get older, the prostate may get bigger in size. This problem is called benign prostatic hyperplasia (BPH). BPH puts pressure on the urethra. This is the tube that carries urine from the bladder to the penis. It may interfere with the flow of urine. It may also keep the bladder from emptying fully.    Symptoms of BPH include trouble starting urination and feeling as though the bladder isn t emptying all the way. It also includes a weak urine stream, dribbling and leaking of urine, and frequent and urgent urination (especially at night). BPH can increase the risk of urinary infections. It can also block off urine flow completely. If this occurs, a thin tube (catheter) may be passed into the bladder to help drain urine.  If symptoms are mild, no treatment may be needed right now. If symptoms are more severe, treatment is likely needed. The goal of treatment is to improve urine flow and reduce symptoms. Treatments can include medicine and procedures. Your healthcare provider will discuss treatment options with you as needed.  Home care  The following guidelines will help you care for yourself at home:    Urinate as soon as you feel the urge. Don't try to hold your urine.    Don't limit your fluid intake during the day. Drink 6 to 8 glasses of water or liquids a day. This prevents bacteria from building up in the bladder.    Avoid drinking fluids after dinner to help reduce urination during the night.    Avoid medicines that can worsen your symptoms. These include certain cold and allergy medicines and antidepressants. Diuretics used for high blood pressure can also worsen symptoms. Talk to your doctor about the medicines you take. Other choices may work better for you.  Prostate cancer screening  BPH does not increase the risk of prostate cancer. But because prostate cancer is a common cancer in  "men, screening is sometimes recommended. This may help detect the cancer in its early stages when treatment is most effective. Factors that can increase the risk of prostate cancer include being -American or having a father or brother who had prostate cancer. A high-fat diet may also increase the risk of prostate cancer. Talk to your healthcare provider to see whether you should be screened for prostate cancer.  Follow-up care  Follow up with your healthcare provider, or as advised  To learn more, go to:    National Kidney & Urologic Diseases Information Clearinghouse  kidney.niddk.nih.gov, 489.515.8510  When to seek medical advice  Call your healthcare provider right away if any of these occur:    Fever of 100.4 F (38.0 C) or higher, or as advised    Unable to pass urine for 8 hours    Increasing pressure or pain in your bladder (lower abdomen)    Blood in the urine    Increasing low back pain, not related to injury    Symptoms of urinary infection (increased urge to urinate, burning when passing urine, foul-smelling urine)  Date Last Reviewed: 7/1/2016 2000-2019 [employer]. [address] [place] [id number]. All rights reserved. This information is not intended as a substitute for professional medical care. Always follow your healthcare professional's instructions.           Patient Education     Coronavirus Disease 2019 (COVID-19): Prevention  The best way to prevent COVID-19 is to avoid contact with the virus. There is no vaccine yet.  Cancel travel and other outings  Stay informed about COVID-19 in your area. School, sporting events and other activities may be cancelled. Follow local instructions. For example, you may need to:     Stay at least 6 feet away from others. This is called \"social distancing.\"    Stay at home and isolate yourself. You may hear terms like \"self-isolate, \"quarantine,\"  stay at home,   shelter in place,  and  lockdown.   Don t travel to areas with a " "COVID-19 outbreak. Don t go on a cruise. It s best to cancel any non-essential travel right now.  For the most up-to-date travel advisories, visit the CDC website at www.cdc.gov/coronavirus/2019-ncov/travelers.  When you re at home    Wash your hands often. Use soap and clean, running water for at least 20 seconds. Or, use hand  that has at least 60% alcohol.    Don't touch your eyes, nose, or mouth unless you have clean hands.    Don t kiss someone who is sick.    If you need to cough or sneeze, do it into a tissue. Then, throw the tissue into the trash. If you don't have tissues, cough or sneeze into the bend of your elbow.    Try to avoid \"high-touch\" surfaces, like doorknobs, handles, light switches, desks, printers, phones, kitchen counters, tables and bathroom surfaces. Clean these often with disinfectant (read the label for instructions). For cleaning tips, go to www.cdc.gov/coronavirus/2019-ncov/prepare/cleaning-disinfection.html.    Check your home supplies. Try to keep a 2-week supply of medicine, food, and household items.    Make a plan for childcare, work, and ways to stay in touch with others. Know who will help you if you get sick.    Don't be around people who are sick.    Don t share eating or drinking utensils with sick people.    Wash your hands after touching animals. Don't touch animals that may be sick.  If you leave home    Stay at least 6 feet away from all people.    Try to avoid \"high-touch\" public surfaces, like doorknobs, handles, and light switches. If you need to touch these, clean them first with a disinfecting wipe. Or, touch them using a tissue or paper towel.    Use hand  often. Make sure it has at least 60% alcohol.    Don't touch your eyes, nose, or mouth unless you have clean hands.    If you need to cough or sneeze, do it into a tissue. Then throw the tissue into the trash. If you don't have tissues, cough or sneeze into the bend of your elbow.    Avoid public " gatherings.    Wear a cloth face mask in public. You may need to make your own mask using a bandana, T-shirt, or other cloth. See the CDC s instructions on how to make a mask.  If you re at a work site    Follow all of the instructions above.    Wash your hands often with soap and clean, running water for at least 20 seconds. Or, use hand  with at least 60% alcohol.    Don't shake hands. Don t have in-person meetings. (Meet over phone or video.)    Use office cas one person at a time. Don t share coffee, tea or food in the office. Don t have meals in groups.    Clean work surfaces often with disinfectant. These include desks, photocopiers, printers, phones, kitchen counters, fridge door handles, bathroom surfaces, and others.    Don t touch other people s phones, keyboards, pens, eating or drinking utensils, or other items.    If you feel sick in any way, go home and stay home.  If you ve been around someone with COVID-19  In the past 14 days, if you've been around someone who has (or may have) COVID-19:    Contact your care team to ask for advice. Follow all instructions. You may need to stay home and limit your activities for up to 2 weeks.    Take your temperature every morning and evening for at least 14 days. This is to check for fever. Keep a record of the readings.    Watch for symptoms of the virus. (See the CDC's symptom .) If you have symptoms, contact your care team.    Stay home if you re sick for any reason. If you need to go to a clinic or hospital, call ahead to let them know you re coming.  When to contact your care team  Call your care team if you think you have COVID-19 symptoms. These can include fever, cough, trouble breathing, body aches, headaches, chills or repeated shaking with chills, sore throat, loss of tasted or smell, or diarrhea (loose, watery poops).  Don t go to a clinic or hospital before speaking to your care team.  Last modified date: 4/27/2020  This  information has been modified by your health care provider with permission from the publisher.      [id number] [facility], [address] [place] [address], [place] [address]. All rights reserved. This information is not intended as a substitute for professional medical care. Always follow your healthcare professional's instructions. This information has been modified by your health care provider with permission from the publisher.           Patient Education     Managing Diabetes: The A1C Test       Healthy red blood cells have some glucose stuck to them. A high A1C means that unhealthy amounts of glucose are stuck to the cells.   What is the A1C test?  Using your meter helps you track your blood sugar every day. But your glucose meter tells you the value at the time of testing only. You also need to know if your treatment plan is keeping you healthy over time. The hemoglobin A1C (or glycated hemoglobin) test can help. This test measures your average blood sugar level over a few months. A higher A1C result means that you have a higher risk of developing complications.  The A1C test  The A1C is a blood test done by your healthcare provider. You will likely have an A1C test every 3 to 6 months.  Your blood glucose goal  A1C has been shown as a percentage. But it can also be shown as a number representing the estimated Average Glucose (eAG). Unlike the A1C percentage, eAG is a number similar to the numbers listed on your daily glucose monitor. Both A1C and eAG measure the amount of glucose stuck to a protein called hemoglobin in red blood cells. Your healthcare provider will help you figure out what your ideal A1C or eAG should be. Your target number will depend on your age, general health, and other factors. If your current number is too high, your treatment plan may need changes, such as different medicines.  Sample results  Most people aim for an A1c lower than 7%. That s an eAG less than 154 mg/dL. Or, your healthcare provider  may want you to aim for an A1C of 6%. That s an eAG of 126 mg/dL.     Glucose calculator  Visit http://professional.diabetes.org/diapro/glucose_calc for a chart that helps convert your A1C percentages into eAG numbers.   Date Last Reviewed: 6/1/2016 2000-2019 [employer]. [address] [place] [address], [place] [address]. All rights reserved. This information is not intended as a substitute for professional medical care. Always follow your healthcare professional's instructions.

## 2020-05-19 DIAGNOSIS — I10 HYPERTENSION, GOAL BELOW 140/90: ICD-10-CM

## 2020-05-19 DIAGNOSIS — Z12.5 SCREENING FOR PROSTATE CANCER: ICD-10-CM

## 2020-05-19 DIAGNOSIS — Z13.6 CARDIOVASCULAR SCREENING; LDL GOAL LESS THAN 160: ICD-10-CM

## 2020-05-19 DIAGNOSIS — R73.9 ELEVATED BLOOD SUGAR: ICD-10-CM

## 2020-05-19 DIAGNOSIS — E06.3 HYPOTHYROIDISM DUE TO HASHIMOTO'S THYROIDITIS: ICD-10-CM

## 2020-05-19 LAB
ANION GAP SERPL CALCULATED.3IONS-SCNC: 2 MMOL/L (ref 3–14)
BUN SERPL-MCNC: 19 MG/DL (ref 7–30)
CALCIUM SERPL-MCNC: 9.3 MG/DL (ref 8.5–10.1)
CHLORIDE SERPL-SCNC: 106 MMOL/L (ref 94–109)
CHOLEST SERPL-MCNC: 182 MG/DL
CO2 SERPL-SCNC: 32 MMOL/L (ref 20–32)
CREAT SERPL-MCNC: 0.92 MG/DL (ref 0.66–1.25)
ERYTHROCYTE [DISTWIDTH] IN BLOOD BY AUTOMATED COUNT: 12.3 % (ref 10–15)
GFR SERPL CREATININE-BSD FRML MDRD: 87 ML/MIN/{1.73_M2}
GLUCOSE SERPL-MCNC: 142 MG/DL (ref 70–99)
HBA1C MFR BLD: 7.3 % (ref 0–5.6)
HCT VFR BLD AUTO: 46.7 % (ref 40–53)
HDLC SERPL-MCNC: 56 MG/DL
HGB BLD-MCNC: 15.4 G/DL (ref 13.3–17.7)
LDLC SERPL CALC-MCNC: 112 MG/DL
MCH RBC QN AUTO: 31.4 PG (ref 26.5–33)
MCHC RBC AUTO-ENTMCNC: 33 G/DL (ref 31.5–36.5)
MCV RBC AUTO: 95 FL (ref 78–100)
NONHDLC SERPL-MCNC: 126 MG/DL
PLATELET # BLD AUTO: 220 10E9/L (ref 150–450)
POTASSIUM SERPL-SCNC: 3.6 MMOL/L (ref 3.4–5.3)
PSA SERPL-ACNC: 1.32 UG/L (ref 0–4)
RBC # BLD AUTO: 4.9 10E12/L (ref 4.4–5.9)
SODIUM SERPL-SCNC: 140 MMOL/L (ref 133–144)
TRIGL SERPL-MCNC: 70 MG/DL
TSH SERPL DL<=0.005 MIU/L-ACNC: 3.2 MU/L (ref 0.4–4)
WBC # BLD AUTO: 6.4 10E9/L (ref 4–11)

## 2020-05-19 PROCEDURE — G0103 PSA SCREENING: HCPCS | Performed by: FAMILY MEDICINE

## 2020-05-19 PROCEDURE — 85027 COMPLETE CBC AUTOMATED: CPT | Performed by: FAMILY MEDICINE

## 2020-05-19 PROCEDURE — 80048 BASIC METABOLIC PNL TOTAL CA: CPT | Performed by: FAMILY MEDICINE

## 2020-05-19 PROCEDURE — 84443 ASSAY THYROID STIM HORMONE: CPT | Performed by: FAMILY MEDICINE

## 2020-05-19 PROCEDURE — 36415 COLL VENOUS BLD VENIPUNCTURE: CPT | Performed by: FAMILY MEDICINE

## 2020-05-19 PROCEDURE — 80061 LIPID PANEL: CPT | Performed by: FAMILY MEDICINE

## 2020-05-19 PROCEDURE — 83036 HEMOGLOBIN GLYCOSYLATED A1C: CPT | Performed by: FAMILY MEDICINE

## 2020-05-20 PROBLEM — E11.9 DIABETES MELLITUS, TYPE 2 (H): Status: ACTIVE | Noted: 2020-05-20

## 2020-05-20 RX ORDER — METFORMIN HCL 500 MG
500 TABLET, EXTENDED RELEASE 24 HR ORAL
Qty: 90 TABLET | Refills: 3 | Status: SHIPPED | OUTPATIENT
Start: 2020-05-20 | End: 2021-05-06

## 2020-05-20 NOTE — RESULT ENCOUNTER NOTE
Dear Robert    Your test results are attached.    The diabetes test is high and shows that you are in diabetic range. We should start a low dose of metformin to help lower your blood sugar. You do not need to start checking your blood sugars, unless you would like to do so. Let me know. Usually we give metformin with the biggest meal of the day to help metabolize the carbohydrates. Please call to set up a follow up visit and we can go over this in more detail. I will send a prescription to your pharmacy.    The thyroid test is normal. The test for prostate cancer was normal and shows low risk. The kidneys are healthy. The cholesterol looks good.     Please contact me by Evergaget if you have any questions about your labs or management. You may also call my office number [phone] for any questions.     Renee Byrd MD

## 2020-05-22 ENCOUNTER — MYC MEDICAL ADVICE (OUTPATIENT)
Dept: FAMILY MEDICINE | Facility: CLINIC | Age: 65
End: 2020-05-22

## 2020-05-22 DIAGNOSIS — J32.4 CHRONIC PANSINUSITIS: ICD-10-CM

## 2020-05-22 DIAGNOSIS — J33.9 NASAL POLYPOSIS: ICD-10-CM

## 2020-05-22 NOTE — TELEPHONE ENCOUNTER
Requested Prescriptions   Pending Prescriptions Disp Refills     COMPOUND CONTAINING CONTROLLED SUBSTANCE (CMPD RX) - PHARMACY TO MIX COMPOUNDED MEDICATION        Sig: TEST       There is no refill protocol information for this order

## 2020-05-22 NOTE — TELEPHONE ENCOUNTER
Patient has virtual visit on 05/11/2020.    Routing to provider to please advise on mychart message or if a follow up apt would be needed to discuss.      Dr. Byrd,     I do not want to do a daily blood check at this time.  I am wondering if it is ok to have a occasional beer when I   am taking the Metformin that you have prescribed for me.     Thank you,  Adriano Chaparro RN  [facility] [place]

## 2020-05-29 ENCOUNTER — VIRTUAL VISIT (OUTPATIENT)
Dept: FAMILY MEDICINE | Facility: CLINIC | Age: 65
End: 2020-05-29
Payer: MEDICARE

## 2020-05-29 DIAGNOSIS — E78.5 HYPERLIPIDEMIA LDL GOAL <100: ICD-10-CM

## 2020-05-29 DIAGNOSIS — I10 HYPERTENSION, GOAL BELOW 140/90: ICD-10-CM

## 2020-05-29 DIAGNOSIS — E11.9 TYPE 2 DIABETES MELLITUS WITHOUT COMPLICATION, WITHOUT LONG-TERM CURRENT USE OF INSULIN (H): Primary | ICD-10-CM

## 2020-05-29 DIAGNOSIS — R05.3 CHRONIC COUGH: ICD-10-CM

## 2020-05-29 DIAGNOSIS — E06.3 HYPOTHYROIDISM DUE TO HASHIMOTO'S THYROIDITIS: ICD-10-CM

## 2020-05-29 PROBLEM — R73.9 ELEVATED BLOOD SUGAR: Status: RESOLVED | Noted: 2017-01-09 | Resolved: 2020-05-29

## 2020-05-29 PROCEDURE — 99442: CPT | Performed by: FAMILY MEDICINE

## 2020-05-29 RX ORDER — OMEPRAZOLE 40 MG/1
40 CAPSULE, DELAYED RELEASE ORAL DAILY
Qty: 90 CAPSULE | Refills: 1 | Status: SHIPPED | OUTPATIENT
Start: 2020-05-29 | End: 2020-11-21

## 2020-05-29 RX ORDER — PRAVASTATIN SODIUM 40 MG
40 TABLET ORAL DAILY
Qty: 90 TABLET | Refills: 1 | Status: SHIPPED | OUTPATIENT
Start: 2020-05-29 | End: 2020-10-11

## 2020-05-29 ASSESSMENT — PAIN SCALES - GENERAL: PAINLEVEL: NO PAIN (0)

## 2020-05-29 NOTE — PROGRESS NOTES
"Robert Carolina is a 64 year old male who is being evaluated via a billable telephone visit.      The patient has been notified of following:     \"This telephone visit will be conducted via a call between you and your physician/provider. We have found that certain health care needs can be provided without the need for a physical exam.  This service lets us provide the care you need with a short phone conversation.  If a prescription is necessary we can send it directly to your pharmacy.  If lab work is needed we can place an order for that and you can then stop by our lab to have the test done at a later time.    Telephone visits are billed at different rates depending on your insurance coverage. During this emergency period, for some insurers they may be billed the same as an in-person visit.  Please reach out to your insurance provider with any questions.    If during the course of the call the physician/provider feels a telephone visit is not appropriate, you will not be charged for this service.\"    Patient has given verbal consent for Telephone visit?  Yes    What phone number would you like to be contacted at? [phone]    How would you like to obtain your AVS? Brennanhart    Talat     Robert Carolina is a 64 year old male who presents via phone visit today for the following health issues:    HPI  Diabetes Follow-up      How often are you checking your blood sugar? Not at all    What concerns do you have today about your diabetes? None     Do you have any of these symptoms? (Select all that apply)  No numbness or tingling in feet.  No redness, sores or blisters on feet.  No complaints of excessive thirst.  No reports of blurry vision.  No significant changes to weight.    Have you had a diabetic eye exam in the last 12 months? No        BP Readings from Last 2 Encounters:   03/21/19 139/83   08/21/18 145/79     Hemoglobin A1C (%)   Date Value   05/19/2020 7.3 (H)   03/21/2019 6.2 (H)     LDL Cholesterol " Calculated (mg/dL)   Date Value   05/19/2020 112 (H)   03/21/2019 107 (H)           How many servings of fruits and vegetables do you eat daily?  2-3    On average, how many sweetened beverages do you drink each day (Examples: soda, juice, sweet tea, etc.  Do NOT count diet or artificially sweetened beverages)?   0    How many days per week do you exercise enough to make your heart beat faster? none    How many minutes a day do you exercise enough to make your heart beat faster? na    How many days per week do you miss taking your medication? 0         Hyperlipidemia Follow-Up      Are you regularly taking any medication or supplement to lower your cholesterol?   No    Are you having muscle aches or other side effects that you think could be caused by your cholesterol lowering medication?  No    Hypertension Follow-up      Do you check your blood pressure regularly outside of the clinic? Yes     Are you following a low salt diet? Yes    Are your blood pressures ever more than 140 on the top number (systolic) OR more   than 90 on the bottom number (diastolic), for example 140/90? No    Hypothyroidism Follow-up      Since last visit, patient describes the following symptoms: Weight stable, no hair loss, no skin changes, no constipation, no loose stools      Patient Active Problem List   Diagnosis     Nasal polyposis     Chronic sinusitis     Advanced directives, counseling/discussion     SCOTT (obstructive sleep apnea)-Mild (AHI 6)     Nevus of multiple sites of trunk     Hypothyroidism due to Hashimoto's thyroiditis     Dupuytren's contracture of both hands     Personal history of gastric ulcer     Nocturia associated with benign prostatic hyperplasia     Hypertension, goal below 140/90     Family hx of prostate cancer     Diabetes mellitus, type 2 (H)     Hyperlipidemia LDL goal <100     Past Surgical History:   Procedure Laterality Date     ARTHROSCOPY KNEE RT/LT      LT - Meniscus injury, Dr. Miguel LITTLEJOHN  NONSPECIFIC PROCEDURE      Stapled Stomach - due to ulcer     C ORAL SURGERY PROCEDURE      [place] teeth     OPTICAL TRACKING SYSTEM ENDOSCOPIC SINUS SURGERY  7/21/2011    Procedure:OPTICAL TRACKING SYSTEM ENDOSCOPIC SINUS SURGERY; image guided endoscopic septoplasty, bilateral ethmoidectomy, bilateral maxillary antrostomy, polypectomy,bilateral sphenoid sinusotomies, bilateral frontal sinusotomies; Surgeon:PAT OHLDER; Location:[facility] OR     SINUS SURGERY  2009       Social History     Tobacco Use     Smoking status: Never Smoker     Smokeless tobacco: Never Used   Substance Use Topics     Alcohol use: Yes     Comment: 5 per week     Family History   Problem Relation Age of Onset     Diabetes Mother      Skin Cancer Father      Prostate Cancer Brother      Melanoma No family hx of          Current Outpatient Medications   Medication Sig Dispense Refill     ALAVERT OR once daily       Bismuth Subsalicylate (PEPTO-BISMOL PO) Take  by mouth as needed.       COMPOUNDED NON-CONTROLLED SUBSTANCE (CMPD RX) - PHARMACY TO MIX COMPOUNDED MEDICATION Apply 20 mLs into each nare 2 times daily Gent/Dex Nasal Irrigation 2000 mL 11     COMPOUNDED NON-CONTROLLED SUBSTANCE (CMPD RX) - PHARMACY TO MIX COMPOUNDED MEDICATION Apply 20 mLs into each nare 2 times daily Itraconazole 0.01% solution 2000 mL 6     levothyroxine (SYNTHROID/LEVOTHROID) 100 MCG tablet TAKE 1 TABLET(100 MCG) BY MOUTH DAILY 90 tablet 1     metFORMIN (GLUCOPHAGE-XR) 500 MG 24 hr tablet Take 1 tablet (500 mg) by mouth daily (with dinner) 90 tablet 3     OCUVITE OR once daily       omeprazole (PRILOSEC) 40 MG DR capsule Take 1 capsule (40 mg) by mouth daily Take 30-60 minutes before a meal. 90 capsule 1     STATIN NOT PRESCRIBED (INTENTIONAL) Please choose reason not prescribed, below       terazosin (HYTRIN) 2 MG capsule Take 2 capsules (4 mg) by mouth At Bedtime 180 capsule 1     TYLENOL 325 MG PO TABS 4 time per week       Allergies   Allergen Reactions      Cefuroxime      Other reaction(s): Stomach Upset     Ceftin GI Disturbance     Oxycodone-Acetaminophen Rash     Recent Labs   Lab Test 05/19/20  0852 03/21/19  0737  01/18/18  0811 01/09/17  0853  11/01/12  1716   A1C 7.3* 6.2*  --  6.0 5.9   < >  --    * 107*  --   --  128*   < >  --    HDL 56 56  --   --  63   < >  --    TRIG 70 89  --   --  78   < >  --    ALT  --  34  --   --   --   --  32   CR 0.92 0.85  --  0.88 1.07   < > 0.79   GFRESTIMATED 87 >90  --  88 70   < > >90   GFRESTBLACK >90 >90  --  >90 85   < > >90   POTASSIUM 3.6 4.2  --  3.7 4.3   < > 4.0   TSH 3.20 3.16   < > 6.70* 6.03*   < > 18.50*    < > = values in this interval not displayed.      BP Readings from Last 3 Encounters:   03/21/19 139/83   08/21/18 145/79   03/14/18 122/70    Wt Readings from Last 3 Encounters:   03/21/19 73 kg (161 lb)   08/21/18 72.6 kg (160 lb)   02/13/18 72.6 kg (160 lb)                    Reviewed and updated as needed this visit by Provider         Review of Systems   Constitutional, HEENT, cardiovascular, pulmonary, gi and gu systems are negative, except as otherwise noted.       Objective   Reported vitals:  There were no vitals taken for this visit.   healthy, alert and no distress  PSYCH: Alert and oriented times 3; coherent speech, normal   rate and volume, able to articulate logical thoughts, able   to abstract reason, no tangential thoughts, no hallucinations   or delusions  His affect is normal  RESP: No cough, no audible wheezing, able to talk in full sentences  Remainder of exam unable to be completed due to telephone visits    Diagnostic Test Results:  Labs reviewed in Epic  Orders Only on 05/19/2020   Component Date Value Ref Range Status     TSH 05/19/2020 3.20  0.40 - 4.00 mU/L Final     Hemoglobin A1C 05/19/2020 7.3* 0 - 5.6 % Final    Comment: Normal <5.7% Prediabetes 5.7-6.4%  Diabetes 6.5% or higher - adopted from ADA   consensus guidelines.       Cholesterol 05/19/2020 182  <200 mg/dL Final      Triglycerides 05/19/2020 70  <150 mg/dL Final    Fasting specimen     HDL Cholesterol 05/19/2020 56  >39 mg/dL Final     LDL Cholesterol Calculated 05/19/2020 112* <100 mg/dL Final    Comment: Above desirable:  100-129 mg/dl  Borderline High:  130-159 mg/dL  High:             160-189 mg/dL  Very high:       >189 mg/dl       Non HDL Cholesterol 05/19/2020 126  <130 mg/dL Final     WBC 05/19/2020 6.4  4.0 - 11.0 10e9/L Final     RBC Count 05/19/2020 4.90  4.4 - 5.9 10e12/L Final     Hemoglobin 05/19/2020 15.4  13.3 - 17.7 g/dL Final     Hematocrit 05/19/2020 46.7  40.0 - 53.0 % Final     MCV 05/19/2020 95  78 - 100 fl Final     MCH 05/19/2020 31.4  26.5 - 33.0 pg Final     MCHC 05/19/2020 33.0  31.5 - 36.5 g/dL Final     RDW 05/19/2020 12.3  10.0 - 15.0 % Final     Platelet Count 05/19/2020 220  150 - 450 10e9/L Final     Sodium 05/19/2020 140  133 - 144 mmol/L Final     Potassium 05/19/2020 3.6  3.4 - 5.3 mmol/L Final     Chloride 05/19/2020 106  94 - 109 mmol/L Final     Carbon Dioxide 05/19/2020 32  20 - 32 mmol/L Final     Anion Gap 05/19/2020 2* 3 - 14 mmol/L Final     Glucose 05/19/2020 142* 70 - 99 mg/dL Final    Fasting specimen     Urea Nitrogen 05/19/2020 19  7 - 30 mg/dL Final     Creatinine 05/19/2020 0.92  0.66 - 1.25 mg/dL Final     GFR Estimate 05/19/2020 87  >60 mL/min/[1.73_m2] Final    Comment: Non  GFR Calc  Starting 12/18/2018, serum creatinine based estimated GFR (eGFR) will be   calculated using the Chronic Kidney Disease Epidemiology Collaboration   (CKD-EPI) equation.       GFR Estimate If Black 05/19/2020 >90  >60 mL/min/[1.73_m2] Final    Comment:  GFR Calc  Starting 12/18/2018, serum creatinine based estimated GFR (eGFR) will be   calculated using the Chronic Kidney Disease Epidemiology Collaboration   (CKD-EPI) equation.       Calcium 05/19/2020 9.3  8.5 - 10.1 mg/dL Final     PSA 05/19/2020 1.32  0 - 4 ug/L Final    Assay Method:  Chemiluminescence using  Siemens Austin analyzer              Assessment/Plan:  1. Chronic cough  Felt due to reflux symptoms and treated with proton pump inhibitor by ENT. Refill needed and will be done by primary care provider at this time.  - omeprazole (PRILOSEC) 40 MG DR capsule; Take 1 capsule (40 mg) by mouth daily Take 30-60 minutes before a meal.  Dispense: 90 capsule; Refill: 1    2. Type 2 diabetes mellitus without complication, without long-term current use of insulin (H)  New diagnosis and A1C 7.2. will watch carbohydrates and increase exercise. Recheck in 3 months if A1C in normal range consider discontinue metformin and work on diet alone    3. Hypertension, goal below 140/90  Well controlled on medications     4. Hypothyroidism due to Hashimoto's thyroiditis  Stable     5. Hyperlipidemia LDL goal <100  Discussed new goal with LDL and starting statin medication. Risks and benefits reviewed. Side effects to watch out for.   - pravastatin (PRAVACHOL) 40 MG tablet; Take 1 tablet (40 mg) by mouth daily  Dispense: 90 tablet; Refill: 1    Return in about 3 months (around 8/29/2020) for Lab Work, Physical Exam, medication follow up.      Phone call duration:  16 minutes    Renee Byrd MD

## 2020-05-29 NOTE — PATIENT INSTRUCTIONS
At [facility], we strive to deliver an exceptional experience to you, every time we see you. If you receive a survey, please complete it as we do value your feedback.  If you have MyChart, you can expect to receive results automatically within 24 hours of their completion.  Your provider will send a note interpreting your results as well.   If you do not have MyChart, you should receive your results in about a week by mail.    Your care team:                            Family Medicine Internal Medicine   MD Kadeem Pruitt MD Shantel Branch-Fleming, MD Katya Georgiev PA-C Megan Hill, APRN CNP    Giovani Leger, MD Pediatrics   Samy Salazar, PALARON Pettit, MD Gisell Pedro APRN CNP   MD Margie Merritt MD Deborah Mielke, MD Kim Thein, APRN [facility]      Clinic hours: Monday - Thursday 7 am-7 pm; Fridays 7 am-5 pm.   Urgent care: Monday - Friday 11 am-9 pm; Saturday and Sunday 9 am-5 pm.    Clinic: [phone]       [facility]: Monday - Thursday 8 am - 7 pm; Friday 8 am - 6 pm  [facility]: [phone]     Use www.oncare.org for 24/7 diagnosis and treatment of dozens of conditions.    Patient Education     Managing Type 2 Diabetes    Type 2 diabetes is a long-term (chronic) condition. Managing diabetes may mean making some tough changes. Your healthcare team can help you.  To manage type 2 diabetes, you'll need to balance your medicine with diet and activity. You will also need check your blood sugar. And work with your healthcare provider to prevent complications.  Take your medicine  You may take pills or give yourself insulin shots for diabetes. Or you may use both. Take your medicines or give yourself insulin at the right times to help you control your blood sugar. Think about ways that will help you remember to take your medicines the right way every day. Ask your healthcare provider or  team for ideas.  You may only take pills for your diabetes. But this may change. Over time, most people with type 2 diabetes also need insulin.  Eat healthy  A healthy diet helps control the amount of sugar in your blood. It also helps you stay at a healthy weight. Or it helps you lose weight, if if you're overweight. Extra weight makes it harder to control diabetes.  Your healthcare team will help you create a plan that works for you. You don't have to give up all the foods you like. Have meals and snacks with:    Vegetables    Fruits    Lean meats or other healthy proteins    Whole grains    Low- or nonfat dairy products    Be physically active  Being active helps lower your blood sugar. Activity helps your body use insulin to turn food into energy. It also helps you manage your weight:  Ask your healthcare provider to help you to make an activity program that's right for you. Your program is based on your age, general health, and types of activity you enjoy. Start slow. But aim for at least 150 minutes of exercise or activity each week. Start with 30 minutes a day. Exercise in 10-minute blocks. Don t let more than 2 days go by without being active.  Check your blood sugar  Checking your own blood sugar may be a regular part of your care. Or you may only need to check your blood sugar from time to time. Your healthcare provider will tell you how to check your blood sugar at home. Checking it tells you if your blood sugar is in your target range. Having your blood sugars within the target range means that you are managing your diabetes well.  If your blood sugar levels are too high or too low, your healthcare provider may suggest changes to your diet or activity level. He or she may also adjust your medicine.  Your healthcare provider may also tell you to check your blood sugar more often when you are sick.   Take care of yourself  When you have diabetes, you may be more likely to get other health problems. They  include foot, eye, heart, nerve, and kidney problems. You can help prevent these problems by controlling your blood sugar and taking good care of yourself. Your healthcare provider, nurse, diabetes educator, and others can help you with the following:    Checkups. You should have regular checkups with your healthcare provider. At those visits, you will have a physical exam that includes checking your feet. Your healthcare provider will also check your blood pressure and weight. Take your shoes off before your appointment starts to be sure your feet are checked.    Other exams. You'll also need eye, foot, and dental exams at least once each year.    Lab tests. You will have blood and urine tests:  ? Your healthcare provider will check your hemoglobin A1C at least twice a year. This blood test shows how well you have been controlling your blood sugar over 2 to 3 months. The results help your healthcare provider manage your diabetes.    ? You will also have other lab tests. For example, to check for kidney problems and abnormal cholesterol levels.    Smoking. If you smoke, you will need to quit. Smoking makes it more likely to get complications from diabetes. Ask your healthcare provider about ways to quit.    Vaccines. Get a yearly flu shot. And ask your healthcare provider about vaccines to prevent pneumonia, shingles, and hepatitis B.  Stress and depression  Most people have challenges throughout their lives. Living with diabetes can increase your stress. Feeling stressed or depressed can actually affect your blood sugar levels.  Tell your healthcare provider if you are having trouble coping with diabetes. He or she can help or refer you to other providers or programs.  Support and resources  Know where you can get help. You can try the following:    Support. Ask family and friends to support your efforts to take care of yourself. Or look for a diabetes support group nearby or on the internet. Check the Connect with  Others link on www.diabetes.org.    Counseling. Talk with a , psychologist, psychiatrist, or other counselor.    Information. Contact the American Diabetes Association at 236-652-2878 or www.diabetes.org  Date Last Reviewed: 11/1/2018 2000-2019 [employer]. [address] [place] [address], [place] [address]. All rights reserved. This information is not intended as a substitute for professional medical care. Always follow your healthcare professional's instructions.

## 2020-07-30 ENCOUNTER — TELEPHONE (OUTPATIENT)
Dept: FAMILY MEDICINE | Facility: CLINIC | Age: 65
End: 2020-07-30

## 2020-07-30 NOTE — TELEPHONE ENCOUNTER
Dr. Byrd received a fax from [employer], this form is completed and faxed back to fax # [phone].   Sherman Dhillon,  For [facility]

## 2020-10-11 ENCOUNTER — MYC REFILL (OUTPATIENT)
Dept: FAMILY MEDICINE | Facility: CLINIC | Age: 65
End: 2020-10-11

## 2020-10-11 DIAGNOSIS — E06.3 HYPOTHYROIDISM DUE TO HASHIMOTO'S THYROIDITIS: ICD-10-CM

## 2020-10-11 DIAGNOSIS — E78.5 HYPERLIPIDEMIA LDL GOAL <100: ICD-10-CM

## 2020-10-11 DIAGNOSIS — R35.1 NOCTURIA ASSOCIATED WITH BENIGN PROSTATIC HYPERPLASIA: ICD-10-CM

## 2020-10-11 DIAGNOSIS — N40.1 NOCTURIA ASSOCIATED WITH BENIGN PROSTATIC HYPERPLASIA: ICD-10-CM

## 2020-10-14 RX ORDER — PRAVASTATIN SODIUM 40 MG
40 TABLET ORAL DAILY
Qty: 90 TABLET | Refills: 1 | Status: SHIPPED | OUTPATIENT
Start: 2020-10-14 | End: 2021-05-06

## 2020-10-14 RX ORDER — TERAZOSIN 2 MG/1
4 CAPSULE ORAL AT BEDTIME
Qty: 180 CAPSULE | Refills: 1 | Status: SHIPPED | OUTPATIENT
Start: 2020-10-14 | End: 2021-04-16

## 2020-10-14 RX ORDER — LEVOTHYROXINE SODIUM 100 UG/1
TABLET ORAL
Qty: 90 TABLET | Refills: 1 | Status: SHIPPED | OUTPATIENT
Start: 2020-10-14 | End: 2021-05-06

## 2020-10-14 NOTE — TELEPHONE ENCOUNTER
Routing refill request to provider for review/approval because:  BP failed.    Other medications filled per protocol.    Gilda Dunham RN, [facility] Triage

## 2020-11-09 ENCOUNTER — MYC REFILL (OUTPATIENT)
Dept: FAMILY MEDICINE | Facility: CLINIC | Age: 65
End: 2020-11-09

## 2020-11-09 DIAGNOSIS — E78.5 HYPERLIPIDEMIA LDL GOAL <100: ICD-10-CM

## 2020-11-09 DIAGNOSIS — E06.3 HYPOTHYROIDISM DUE TO HASHIMOTO'S THYROIDITIS: ICD-10-CM

## 2020-11-09 RX ORDER — PRAVASTATIN SODIUM 40 MG
40 TABLET ORAL DAILY
Qty: 90 TABLET | Refills: 1 | Status: CANCELLED | OUTPATIENT
Start: 2020-11-09

## 2020-11-09 RX ORDER — LEVOTHYROXINE SODIUM 100 UG/1
TABLET ORAL
Qty: 90 TABLET | Refills: 1 | Status: CANCELLED | OUTPATIENT
Start: 2020-11-09

## 2020-11-21 DIAGNOSIS — R05.3 CHRONIC COUGH: ICD-10-CM

## 2020-11-21 RX ORDER — OMEPRAZOLE 40 MG/1
CAPSULE, DELAYED RELEASE ORAL
Qty: 90 CAPSULE | Refills: 1 | Status: SHIPPED | OUTPATIENT
Start: 2020-11-21 | End: 2021-05-06

## 2020-11-22 NOTE — TELEPHONE ENCOUNTER
"Requested Prescriptions   Pending Prescriptions Disp Refills     omeprazole (PRILOSEC) 40 MG DR capsule [Pharmacy Med Name: OMEPRAZOLE 40MG CAPSULES] 90 capsule 1     Sig: TAKE 1 CAPSULE(40 MG) BY MOUTH DAILY 30 TO 60 MINUTES BEFORE A MEAL       PPI Protocol Passed - 11/21/2020  3:30 AM        Passed - Not on Clopidogrel (unless Pantoprazole ordered)        Passed - No diagnosis of osteoporosis on record        Passed - Recent (12 mo) or future (30 days) visit within the authorizing provider's specialty     Patient has had an office visit with the authorizing provider or a provider within the authorizing providers department within the previous 12 mos or has a future within next 30 days. See \"Patient Info\" tab in inbasket, or \"Choose Columns\" in Meds & Orders section of the refill encounter.              Passed - Medication is active on med list        Passed - Patient is age 18 or older           Signed Prescriptions:                        Disp   Refills    omeprazole (PRILOSEC) 40 MG DR capsule     90 cap*1        Sig: TAKE 1 CAPSULE(40 MG) BY MOUTH DAILY 30 TO 60 MINUTES           BEFORE A MEAL  Authorizing Provider: JAX KAUFFMAN  Ordering User: SHELLEY GILLIAM      "

## 2020-12-13 ENCOUNTER — HEALTH MAINTENANCE LETTER (OUTPATIENT)
Age: 65
End: 2020-12-13

## 2021-03-06 ENCOUNTER — IMMUNIZATION (OUTPATIENT)
Dept: NURSING | Facility: CLINIC | Age: 66
End: 2021-03-06
Payer: COMMERCIAL

## 2021-03-06 PROCEDURE — 91303 PR COVID VAC JANSSEN AD26 0.5ML: CPT

## 2021-03-06 PROCEDURE — 0031A PR COVID VAC JANSSEN AD26 0.5ML: CPT

## 2021-04-09 ENCOUNTER — OFFICE VISIT (OUTPATIENT)
Dept: FAMILY MEDICINE | Facility: CLINIC | Age: 66
End: 2021-04-09
Payer: COMMERCIAL

## 2021-04-09 ENCOUNTER — ANCILLARY PROCEDURE (OUTPATIENT)
Dept: CT IMAGING | Facility: CLINIC | Age: 66
End: 2021-04-09
Attending: PHYSICIAN ASSISTANT
Payer: COMMERCIAL

## 2021-04-09 VITALS
HEART RATE: 72 BPM | TEMPERATURE: 97.6 F | HEIGHT: 70 IN | BODY MASS INDEX: 21.67 KG/M2 | SYSTOLIC BLOOD PRESSURE: 126 MMHG | WEIGHT: 151.38 LBS | OXYGEN SATURATION: 98 % | RESPIRATION RATE: 16 BRPM | DIASTOLIC BLOOD PRESSURE: 66 MMHG

## 2021-04-09 DIAGNOSIS — R22.1 NECK MASS: ICD-10-CM

## 2021-04-09 DIAGNOSIS — R22.1 NECK MASS: Primary | ICD-10-CM

## 2021-04-09 LAB
ANION GAP SERPL CALCULATED.3IONS-SCNC: 1 MMOL/L (ref 3–14)
BUN SERPL-MCNC: 18 MG/DL (ref 7–30)
CALCIUM SERPL-MCNC: 9.6 MG/DL (ref 8.5–10.1)
CHLORIDE SERPL-SCNC: 104 MMOL/L (ref 94–109)
CO2 SERPL-SCNC: 33 MMOL/L (ref 20–32)
CREAT BLD-MCNC: 0.8 MG/DL (ref 0.5–1.2)
CREAT SERPL-MCNC: 0.95 MG/DL (ref 0.66–1.25)
ERYTHROCYTE [DISTWIDTH] IN BLOOD BY AUTOMATED COUNT: 12.7 % (ref 10–15)
GFR SERPL CREATININE-BSD FRML MDRD: 82 ML/MIN/{1.73_M2}
GFR SERPL CREATININE-BSD FRML MDRD: 93 ML/MIN/{1.73_M2}
GFRB: 97
GLUCOSE SERPL-MCNC: 229 MG/DL (ref 70–99)
HCT VFR BLD AUTO: 43.5 % (ref 40–53)
HGB BLD-MCNC: 14.5 G/DL (ref 13.3–17.7)
MCH RBC QN AUTO: 31.7 PG (ref 26.5–33)
MCHC RBC AUTO-ENTMCNC: 33.3 G/DL (ref 31.5–36.5)
MCV RBC AUTO: 95 FL (ref 78–100)
PLATELET # BLD AUTO: 206 10E9/L (ref 150–450)
POTASSIUM SERPL-SCNC: 4 MMOL/L (ref 3.4–5.3)
RBC # BLD AUTO: 4.57 10E12/L (ref 4.4–5.9)
SODIUM SERPL-SCNC: 138 MMOL/L (ref 133–144)
WBC # BLD AUTO: 7.4 10E9/L (ref 4–11)

## 2021-04-09 PROCEDURE — 36415 COLL VENOUS BLD VENIPUNCTURE: CPT | Performed by: PHYSICIAN ASSISTANT

## 2021-04-09 PROCEDURE — 85027 COMPLETE CBC AUTOMATED: CPT | Performed by: PHYSICIAN ASSISTANT

## 2021-04-09 PROCEDURE — 80048 BASIC METABOLIC PNL TOTAL CA: CPT | Performed by: PHYSICIAN ASSISTANT

## 2021-04-09 PROCEDURE — 70491 CT SOFT TISSUE NECK W/DYE: CPT | Mod: TC | Performed by: RADIOLOGY

## 2021-04-09 PROCEDURE — 82565 ASSAY OF CREATININE: CPT | Mod: 59

## 2021-04-09 PROCEDURE — 99214 OFFICE O/P EST MOD 30 MIN: CPT | Performed by: PHYSICIAN ASSISTANT

## 2021-04-09 RX ORDER — IOPAMIDOL 755 MG/ML
200 INJECTION, SOLUTION INTRAVASCULAR ONCE
Status: COMPLETED | OUTPATIENT
Start: 2021-04-09 | End: 2021-04-09

## 2021-04-09 RX ADMIN — IOPAMIDOL 80 ML: 755 INJECTION, SOLUTION INTRAVASCULAR at 13:07

## 2021-04-09 RX ADMIN — Medication 50 ML: at 13:07

## 2021-04-09 ASSESSMENT — ENCOUNTER SYMPTOMS
FEVER: 0
UNEXPECTED WEIGHT CHANGE: 0
ABDOMINAL PAIN: 0
SHORTNESS OF BREATH: 0
LIGHT-HEADEDNESS: 0
DIAPHORESIS: 0
CHILLS: 0
COUGH: 0
NERVOUS/ANXIOUS: 0

## 2021-04-09 ASSESSMENT — MIFFLIN-ST. JEOR: SCORE: 1464.94

## 2021-04-09 NOTE — PROGRESS NOTES
Assessment & Plan     Neck mass  Patient is a 65-year-old male who presents to clinic due to small mass in submental soft tissue patient discovered under chin 2 days ago that is tender to palpation.  Vital signs normal.  Physical exam significant for sublingual mandibular hyperostosis as well as externally palpated firm, nonmobile, soft tissue mass in sublingual area.      Differential diagnosis includes cyst, malignancy, calcification.   Low suspicion for infectious etiology as there are no systemic symptoms and no swelling, erythema, and patient has normal vital signs.     Will evaluate further with labs and imaging.  - CBC with platelets  - Basic metabolic panel  (Ca, Cl, CO2, Creat, Gluc, K, Na, BUN)  - CT Soft Tissue Neck w Contrast; Future       See Patient Instructions    Return in about 1 week (around 4/16/2021), or if symptoms worsen or fail to improve.    Irene Xie, EDENILSON  [facility] GENOVEVA Oh is a 65 year old who presents for the following health issues:     HPI      Patient noticed a mass under his chin two days ago when he put his hand in this area. Area is painful if palpated. No redness, breathing/swallowing problems. No URI symptoms. Patient notes history of chronic sinusitis without worsening post nasal drainage or sore throat.       PMHx Hashimoto's and type 2 diabetes. Heis taking Levothyroxine 100mcg every day without side effects. He notes some loose stool this morning. No dry skin, weight changes, constipation, hair loss, depression/anxiety, urinary sx, extreme thirst, vision changes, headaches.     Patient does not smoke or use tobacco products. He was exposed to 2nd hand smoke as child. Brother has prostate cancer.     TSH   Date Value Ref Range Status   05/19/2020 3.20 0.40 - 4.00 mU/L Final     T4 Free   Date Value Ref Range Status   01/18/2018 1.19 0.76 - 1.46 ng/dL Final     Hemoglobin A1C   Date Value Ref Range Status   05/19/2020 7.3 (H) 0 - 5.6 %  "Final     Comment:     Normal <5.7% Prediabetes 5.7-6.4%  Diabetes 6.5% or higher - adopted from ADA   consensus guidelines.         Wt Readings from Last 4 Encounters:   04/09/21 68.7 kg (151 lb 6 oz)   03/21/19 73 kg (161 lb)   08/21/18 72.6 kg (160 lb)   02/13/18 72.6 kg (160 lb)       Review of Systems   Constitutional: Negative for chills, diaphoresis, fever and unexpected weight change.   HENT: Positive for postnasal drip. Negative for congestion.    Respiratory: Negative for cough and shortness of breath.    Cardiovascular: Negative for chest pain.   Gastrointestinal: Negative for abdominal pain.   Skin: Negative for rash.   Neurological: Negative for light-headedness.   Psychiatric/Behavioral: The patient is not nervous/anxious.             Objective    /66   Pulse 72   Temp 97.6  F (36.4  C) (Tympanic)   Resp 16   Ht 1.765 m (5' 9.5\")   Wt 68.7 kg (151 lb 6 oz)   SpO2 98%   BMI 22.03 kg/m    Body mass index is 22.03 kg/m .  Physical Exam  Vitals signs and nursing note reviewed.   Constitutional:       General: He is not in acute distress.     Appearance: Normal appearance.   HENT:      Head: Normocephalic and atraumatic.      Mouth/Throat:      Mouth: Mucous membranes are moist.      Pharynx: Oropharynx is clear. No oropharyngeal exudate or posterior oropharyngeal erythema.      Comments: Sublingual mandibular hyperostosis.  Eyes:      Extraocular Movements: Extraocular movements intact.      Pupils: Pupils are equal, round, and reactive to light.   Neck:      Musculoskeletal: Normal range of motion.      Comments: palpable firm, non-mobile, aprox 0.75 cm mass sublingual soft tissue of neck  Cardiovascular:      Rate and Rhythm: Normal rate and regular rhythm.      Heart sounds: Normal heart sounds.   Pulmonary:      Effort: Pulmonary effort is normal.      Breath sounds: Normal breath sounds.   Musculoskeletal: Normal range of motion.   Lymphadenopathy:      Cervical: No cervical adenopathy. "   Skin:     General: Skin is warm and dry.   Neurological:      General: No focal deficit present.      Mental Status: He is alert.   Psychiatric:         Mood and Affect: Mood normal.         Behavior: Behavior normal.            CBC, BMP, CT soft tissue neck w pending

## 2021-04-09 NOTE — PATIENT INSTRUCTIONS
For further evaluation of the lump in your neck, we are completing blood work and a CT scan today.  We will contact you with results through [employer] It's what's in store or will call you with any worrisome findings.    Please reach out with questions or concerns.  Take Care,  Irene Xie PA-C

## 2021-04-12 DIAGNOSIS — R22.1 NECK MASS: Primary | ICD-10-CM

## 2021-04-13 ENCOUNTER — ANCILLARY PROCEDURE (OUTPATIENT)
Dept: ULTRASOUND IMAGING | Facility: CLINIC | Age: 66
End: 2021-04-13
Attending: PHYSICIAN ASSISTANT
Payer: COMMERCIAL

## 2021-04-13 DIAGNOSIS — R22.1 NECK MASS: ICD-10-CM

## 2021-04-13 PROCEDURE — 76536 US EXAM OF HEAD AND NECK: CPT

## 2021-04-14 NOTE — PROGRESS NOTES
History of Present Illness - Robert Carolina is a very pleasant 65 year old male last seen on 8/21/2018.    He is status post functinoal endoscopic sinus surgery for polyposis on 7/21/2011.    To review the past year, on exam at the 6/10/2013 visit, things looked good, with some residual disease on the LEFT.  I had him continue the rinses and at the visit on 9/16/13 visit there was still some polypoid disease on the LEFT side.    At the 12/16/13 visit things looked the best I had seen in a year, and I asked him to continue the same irrigation regimen, and also discussed adding surfactant.  He did not try the surfactant at that point, and tells me that unfortunately things had taken a slight turn for the worse at the beginning of 2014.  He could tell because he feels more congested and has a bit of a sore throat from increased post nasal drainage.  And at the 3/17/2014 visit there was clearly endoscopic evidence of purulent disease on the RIGHT side.  I added itraconazole to his Gent and Dex regimen, and also treated with 14 days of bactrim at the 3/17/14 visit.    At the 12/15/14 visit he told me that the only change he had made was adding a drop of baby shampoo to his sinus saline irrigations, and he is happy to report that he thinks it is helping.  His nose is still feeling great, much more open and clear, and there has been no facial pressure.    At the 6/23/2014 visit endoscopy showed his nose looked the best it had in years.    At 12/15/14 visit and the 4/13/15 visit, no changes, still using the rinses with baby shampoo in a saline, as well as medicated rinses 4x/Week.  Unfortunately at the 10/12/15 visit, there was a lot of purulent drainage seen on the LEFT.  SO we doubled the use of the compound irrigations and that got things under control.  So overall in 2015 and 2016, he has maintained himself on really nothing more than saline NeilMed irrigations with a bit of baby shampoo.    At the 8/22/17 visit, he  also had issues with a chronic tickling dry cough.  It seemed to be laryngopharyngeal reflux< so I started reflux medication and he is also here to follow up on that issue as well.  He tells me that the reflux medicaiton didn't seem to help, so he is trying dietary modifications.    At the 8//22/2017 exam, I did find a small early polyp on the LEFT side.  We continued medications, but he did end up having a full blown sinusitis in October.  Since that time, and also at the visit on 12/12/17, there was persistent polyp growth on the LEFT, and then again on the RIGHT.  Treatment with medicated irrigations, but there was still recurrent polyposis, and so at the end of the 12/12 visit I added Budesonide in NeilMed irrigations.      But at this point, he is only on Gent Dex irrigations daily, but his nose is still a problem with thick and occasionally bloody drainage.    The reason he is back because about two weeks ago he noticed a lump in the throat. A CT was done on 4/9/2021 and personally stuided for this visit.  He did have a tooth removed a year ago, no other dental issues since then.  No issues with facial injuries or injuries.    Past Medical History -   Patient Active Problem List   Diagnosis     Nasal polyposis     Chronic sinusitis     Advanced directives, counseling/discussion     SCOTT (obstructive sleep apnea)-Mild (AHI 6)     Nevus of multiple sites of trunk     Hypothyroidism due to Hashimoto's thyroiditis     Dupuytren's contracture of both hands     Personal history of gastric ulcer     Nocturia associated with benign prostatic hyperplasia     Hypertension, goal below 140/90     Family hx of prostate cancer     Diabetes mellitus, type 2 (H)     Hyperlipidemia LDL goal <100       Current Medications -   Current Outpatient Medications:      ALAVERT OR, once daily, Disp: , Rfl:      Bismuth Subsalicylate (PEPTO-BISMOL PO), Take  by mouth as needed., Disp: , Rfl:      COMPOUNDED NON-CONTROLLED SUBSTANCE (CMPD  RX) - PHARMACY TO MIX COMPOUNDED MEDICATION, Apply 20 mLs into each nare 2 times daily Gent/Dex Nasal Irrigation, Disp: 2000 mL, Rfl: 11     COMPOUNDED NON-CONTROLLED SUBSTANCE (CMPD RX) - PHARMACY TO MIX COMPOUNDED MEDICATION, Apply 20 mLs into each nare 2 times daily Itraconazole 0.01% solution, Disp: 2000 mL, Rfl: 6     levothyroxine (SYNTHROID/LEVOTHROID) 100 MCG tablet, TAKE 1 TABLET(100 MCG) BY MOUTH DAILY, Disp: 90 tablet, Rfl: 1     metFORMIN (GLUCOPHAGE-XR) 500 MG 24 hr tablet, Take 1 tablet (500 mg) by mouth daily (with dinner), Disp: 90 tablet, Rfl: 3     OCUVITE OR, once daily, Disp: , Rfl:      omeprazole (PRILOSEC) 40 MG DR capsule, TAKE 1 CAPSULE(40 MG) BY MOUTH DAILY 30 TO 60 MINUTES BEFORE A MEAL, Disp: 90 capsule, Rfl: 1     pravastatin (PRAVACHOL) 40 MG tablet, Take 1 tablet (40 mg) by mouth daily, Disp: 90 tablet, Rfl: 1     STATIN NOT PRESCRIBED (INTENTIONAL), Please choose reason not prescribed, below, Disp:  , Rfl:      terazosin (HYTRIN) 2 MG capsule, Take 2 capsules (4 mg) by mouth At Bedtime, Disp: 180 capsule, Rfl: 1     TYLENOL 325 MG PO TABS, 4 time per week, Disp: , Rfl:     Allergies -   Allergies   Allergen Reactions     Cefuroxime      Other reaction(s): Stomach Upset     Ceftin GI Disturbance     Oxycodone-Acetaminophen Rash       Social History -   Social History     Socioeconomic History     Marital status:      Spouse name: Not on file     Number of children: Not on file     Years of education: Not on file     Highest education level: Not on file   Occupational History     Not on file   Social Needs     Financial resource strain: Not on file     Food insecurity     Worry: Not on file     Inability: Not on file     Transportation needs     Medical: Not on file     Non-medical: Not on file   Tobacco Use     Smoking status: Never Smoker     Smokeless tobacco: Never Used   Substance and Sexual Activity     Alcohol use: Yes     Comment: 5 per week     Drug use: No     Sexual  "activity: Yes     Partners: Female     Birth control/protection: None   Lifestyle     Physical activity     Days per week: Not on file     Minutes per session: Not on file     Stress: Not on file   Relationships     Social connections     Talks on phone: Not on file     Gets together: Not on file     Attends Judaism service: Not on file     Active member of club or organization: Not on file     Attends meetings of clubs or organizations: Not on file     Relationship status: Not on file     Intimate partner violence     Fear of current or ex partner: Not on file     Emotionally abused: Not on file     Physically abused: Not on file     Forced sexual activity: Not on file   Other Topics Concern     Parent/sibling w/ CABG, MI or angioplasty before 65F 55M? Not Asked   Social History Narrative     Not on file       Family History -   Family History   Problem Relation Age of Onset     Diabetes Mother      Skin Cancer Father      Prostate Cancer Brother      Melanoma No family hx of        Review of Systems - As per HPI and PMHx, otherwise 10+ system review of the head and neck, and general constitution is negative.    Physical Exam  /77   Pulse 85   Resp 16   Ht 1.765 m (5' 9.5\")   Wt 72.1 kg (159 lb)   SpO2 95%   BMI 23.14 kg/m      General - The patient is well nourished and well developed, and appears to have good nutritional status.  Alert and oriented to person and place, answers questions and cooperates with examination appropriately.   Head and Face - Normocephalic and atraumatic, with no gross asymmetry noted of the contour of the facial features.  The facial nerve is intact, with strong symmetric movements.  Voice and Breathing - The patient was breathing comfortably without the use of accessory muscles. There was no wheezing, stridor, or stertor.  The patients voice was clear and strong, and had appropriate pitch and quality.  Ears - The tympanic membranes are normal in appearance, bony landmarks " are intact.  No retraction, perforation, or masses.  No fluid or purulence was seen in the external canal or the middle ear. No evidence of infection of the middle ear or external canal, cerumen was normal in appearance.  Eyes - Extraocular movements intact, and the pupils were reactive to light.  Sclera were not icteric or injected, conjunctiva were pink and moist.  Mouth - Examination of the oral cavity showed pink, healthy oral mucosa. No lesions or ulcerations noted.  He has massive nathan mandibularis.The tongue was mobile and midline, and the dentition were in good condition.    Throat - The walls of the oropharynx were smooth, pink, moist, symmetric, and had no lesions or ulcerations.  The tonsillar pillars and soft palate were symmetric.  The uvula was midline on elevation.    Neck - Normal midline excursion of the laryngotracheal complex during swallowing.  Full range of motion on passive movement.  The mass in question was obvious, about 2.5cm in diametere located in the midline  It was firm but mobile. Otherwise, palpation of the occipital, submental, submandibular, internal jugular chain, and supraclavicular nodes did not demonstrate any abnormal lymph nodes or masses.  The carotid pulse was palpable bilaterally.  Palpation of the thyroid was soft and smooth, with no nodules or goiter appreciated.  The trachea was mobile and midline.  Nose - External contour is symmetric, no gross deflection or scars.  Nasal mucosa is covered with thick cloudy drainage and bloody crusts. I took a culture.    A/P - Robert Carolina is a 65 year old male  (R22.1) Neck mass  (primary encounter diagnosis)  I will arrange for a Core needle biopsy on this midline neck mass.  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  But we need to rule out malignancy.    (J32.4) Chronic pansinusitis  His nose has regressed severely, despite daily Gent Dex irrigations.  I have gotten cultures today, and will call with results and  a plan.  Perhaps we need a new CT sinus as well.

## 2021-04-16 ENCOUNTER — OFFICE VISIT (OUTPATIENT)
Dept: OTOLARYNGOLOGY | Facility: CLINIC | Age: 66
End: 2021-04-16
Payer: COMMERCIAL

## 2021-04-16 VITALS
WEIGHT: 159 LBS | BODY MASS INDEX: 22.76 KG/M2 | HEIGHT: 70 IN | RESPIRATION RATE: 16 BRPM | HEART RATE: 85 BPM | DIASTOLIC BLOOD PRESSURE: 77 MMHG | SYSTOLIC BLOOD PRESSURE: 130 MMHG | OXYGEN SATURATION: 95 %

## 2021-04-16 DIAGNOSIS — J32.4 CHRONIC PANSINUSITIS: ICD-10-CM

## 2021-04-16 DIAGNOSIS — R22.1 NECK MASS: Primary | ICD-10-CM

## 2021-04-16 PROCEDURE — 99214 OFFICE O/P EST MOD 30 MIN: CPT | Performed by: OTOLARYNGOLOGY

## 2021-04-16 PROCEDURE — 87077 CULTURE AEROBIC IDENTIFY: CPT | Performed by: OTOLARYNGOLOGY

## 2021-04-16 PROCEDURE — 87075 CULTR BACTERIA EXCEPT BLOOD: CPT | Performed by: OTOLARYNGOLOGY

## 2021-04-16 PROCEDURE — 87076 CULTURE ANAEROBE IDENT EACH: CPT | Performed by: OTOLARYNGOLOGY

## 2021-04-16 PROCEDURE — 87070 CULTURE OTHR SPECIMN AEROBIC: CPT | Performed by: OTOLARYNGOLOGY

## 2021-04-16 PROCEDURE — 87186 SC STD MICRODIL/AGAR DIL: CPT | Performed by: OTOLARYNGOLOGY

## 2021-04-16 ASSESSMENT — MIFFLIN-ST. JEOR: SCORE: 1499.53

## 2021-04-16 ASSESSMENT — PAIN SCALES - GENERAL: PAINLEVEL: NO PAIN (0)

## 2021-04-16 NOTE — LETTER
4/16/2021         RE: Robert Carolina  [address] [place] [address]        Dear Colleague,    Thank you for referring your patient, Robert Carolina, to the [facility]. Please see a copy of my visit note below.    History of Present Illness - Robert Carolina is a very pleasant 65 year old male last seen on 8/21/2018.    He is status post functinoal endoscopic sinus surgery for polyposis on 7/21/2011.    To review the past year, on exam at the 6/10/2013 visit, things looked good, with some residual disease on the LEFT.  I had him continue the rinses and at the visit on 9/16/13 visit there was still some polypoid disease on the LEFT side.    At the 12/16/13 visit things looked the best I had seen in a year, and I asked him to continue the same irrigation regimen, and also discussed adding surfactant.  He did not try the surfactant at that point, and tells me that unfortunately things had taken a slight turn for the worse at the beginning of 2014.  He could tell because he feels more congested and has a bit of a sore throat from increased post nasal drainage.  And at the 3/17/2014 visit there was clearly endoscopic evidence of purulent disease on the RIGHT side.  I added itraconazole to his Gent and Dex regimen, and also treated with 14 days of bactrim at the 3/17/14 visit.    At the 12/15/14 visit he told me that the only change he had made was adding a drop of baby shampoo to his sinus saline irrigations, and he is happy to report that he thinks it is helping.  His nose is still feeling great, much more open and clear, and there has been no facial pressure.    At the 6/23/2014 visit endoscopy showed his nose looked the best it had in years.    At 12/15/14 visit and the 4/13/15 visit, no changes, still using the rinses with baby shampoo in a saline, as well as medicated rinses 4x/Week.  Unfortunately at the 10/12/15 visit, there was a lot of purulent drainage seen on the  LEFT.  SO we doubled the use of the compound irrigations and that got things under control.  So overall in 2015 and 2016, he has maintained himself on really nothing more than saline NeilMed irrigations with a bit of baby shampoo.    At the 8/22/17 visit, he also had issues with a chronic tickling dry cough.  It seemed to be laryngopharyngeal reflux< so I started reflux medication and he is also here to follow up on that issue as well.  He tells me that the reflux medicaiton didn't seem to help, so he is trying dietary modifications.    At the 8//22/2017 exam, I did find a small early polyp on the LEFT side.  We continued medications, but he did end up having a full blown sinusitis in October.  Since that time, and also at the visit on 12/12/17, there was persistent polyp growth on the LEFT, and then again on the RIGHT.  Treatment with medicated irrigations, but there was still recurrent polyposis, and so at the end of the 12/12 visit I added Budesonide in NeilMed irrigations.      But at this point, he is only on Gent Dex irrigations daily, but his nose is still a problem with thick and occasionally bloody drainage.    The reason he is back because about two weeks ago he noticed a lump in the throat. A CT was done on 4/9/2021 and personally stuided for this visit.  He did have a tooth removed a year ago, no other dental issues since then.  No issues with facial injuries or injuries.    Past Medical History -   Patient Active Problem List   Diagnosis     Nasal polyposis     Chronic sinusitis     Advanced directives, counseling/discussion     SCOTT (obstructive sleep apnea)-Mild (AHI 6)     Nevus of multiple sites of trunk     Hypothyroidism due to Hashimoto's thyroiditis     Dupuytren's contracture of both hands     Personal history of gastric ulcer     Nocturia associated with benign prostatic hyperplasia     Hypertension, goal below 140/90     Family hx of prostate cancer     Diabetes mellitus, type 2 (H)      Hyperlipidemia LDL goal <100       Current Medications -   Current Outpatient Medications:      ALAVERT OR, once daily, Disp: , Rfl:      Bismuth Subsalicylate (PEPTO-BISMOL PO), Take  by mouth as needed., Disp: , Rfl:      COMPOUNDED NON-CONTROLLED SUBSTANCE (CMPD RX) - PHARMACY TO MIX COMPOUNDED MEDICATION, Apply 20 mLs into each nare 2 times daily Gent/Dex Nasal Irrigation, Disp: 2000 mL, Rfl: 11     COMPOUNDED NON-CONTROLLED SUBSTANCE (CMPD RX) - PHARMACY TO MIX COMPOUNDED MEDICATION, Apply 20 mLs into each nare 2 times daily Itraconazole 0.01% solution, Disp: 2000 mL, Rfl: 6     levothyroxine (SYNTHROID/LEVOTHROID) 100 MCG tablet, TAKE 1 TABLET(100 MCG) BY MOUTH DAILY, Disp: 90 tablet, Rfl: 1     metFORMIN (GLUCOPHAGE-XR) 500 MG 24 hr tablet, Take 1 tablet (500 mg) by mouth daily (with dinner), Disp: 90 tablet, Rfl: 3     OCUVITE OR, once daily, Disp: , Rfl:      omeprazole (PRILOSEC) 40 MG DR capsule, TAKE 1 CAPSULE(40 MG) BY MOUTH DAILY 30 TO 60 MINUTES BEFORE A MEAL, Disp: 90 capsule, Rfl: 1     pravastatin (PRAVACHOL) 40 MG tablet, Take 1 tablet (40 mg) by mouth daily, Disp: 90 tablet, Rfl: 1     STATIN NOT PRESCRIBED (INTENTIONAL), Please choose reason not prescribed, below, Disp:  , Rfl:      terazosin (HYTRIN) 2 MG capsule, Take 2 capsules (4 mg) by mouth At Bedtime, Disp: 180 capsule, Rfl: 1     TYLENOL 325 MG PO TABS, 4 time per week, Disp: , Rfl:     Allergies -   Allergies   Allergen Reactions     Cefuroxime      Other reaction(s): Stomach Upset     Ceftin GI Disturbance     Oxycodone-Acetaminophen Rash       Social History -   Social History     Socioeconomic History     Marital status:      Spouse name: Not on file     Number of children: Not on file     Years of education: Not on file     Highest education level: Not on file   Occupational History     Not on file   Social Needs     Financial resource strain: Not on file     Food insecurity     Worry: Not on file     Inability: Not on file  "    Transportation needs     Medical: Not on file     Non-medical: Not on file   Tobacco Use     Smoking status: Never Smoker     Smokeless tobacco: Never Used   Substance and Sexual Activity     Alcohol use: Yes     Comment: 5 per week     Drug use: No     Sexual activity: Yes     Partners: Female     Birth control/protection: None   Lifestyle     Physical activity     Days per week: Not on file     Minutes per session: Not on file     Stress: Not on file   Relationships     Social connections     Talks on phone: Not on file     Gets together: Not on file     Attends Jewish service: Not on file     Active member of club or organization: Not on file     Attends meetings of clubs or organizations: Not on file     Relationship status: Not on file     Intimate partner violence     Fear of current or ex partner: Not on file     Emotionally abused: Not on file     Physically abused: Not on file     Forced sexual activity: Not on file   Other Topics Concern     Parent/sibling w/ CABG, MI or angioplasty before 65F 55M? Not Asked   Social History Narrative     Not on file       Family History -   Family History   Problem Relation Age of Onset     Diabetes Mother      Skin Cancer Father      Prostate Cancer Brother      Melanoma No family hx of        Review of Systems - As per HPI and PMHx, otherwise 10+ system review of the head and neck, and general constitution is negative.    Physical Exam  /77   Pulse 85   Resp 16   Ht 1.765 m (5' 9.5\")   Wt 72.1 kg (159 lb)   SpO2 95%   BMI 23.14 kg/m      General - The patient is well nourished and well developed, and appears to have good nutritional status.  Alert and oriented to person and place, answers questions and cooperates with examination appropriately.   Head and Face - Normocephalic and atraumatic, with no gross asymmetry noted of the contour of the facial features.  The facial nerve is intact, with strong symmetric movements.  Voice and Breathing - The " patient was breathing comfortably without the use of accessory muscles. There was no wheezing, stridor, or stertor.  The patients voice was clear and strong, and had appropriate pitch and quality.  Ears - The tympanic membranes are normal in appearance, bony landmarks are intact.  No retraction, perforation, or masses.  No fluid or purulence was seen in the external canal or the middle ear. No evidence of infection of the middle ear or external canal, cerumen was normal in appearance.  Eyes - Extraocular movements intact, and the pupils were reactive to light.  Sclera were not icteric or injected, conjunctiva were pink and moist.  Mouth - Examination of the oral cavity showed pink, healthy oral mucosa. No lesions or ulcerations noted.  He has massive nathan mandibularis.The tongue was mobile and midline, and the dentition were in good condition.    Throat - The walls of the oropharynx were smooth, pink, moist, symmetric, and had no lesions or ulcerations.  The tonsillar pillars and soft palate were symmetric.  The uvula was midline on elevation.    Neck - Normal midline excursion of the laryngotracheal complex during swallowing.  Full range of motion on passive movement.  The mass in question was obvious, about 2.5cm in diametere located in the midline  It was firm but mobile. Otherwise, palpation of the occipital, submental, submandibular, internal jugular chain, and supraclavicular nodes did not demonstrate any abnormal lymph nodes or masses.  The carotid pulse was palpable bilaterally.  Palpation of the thyroid was soft and smooth, with no nodules or goiter appreciated.  The trachea was mobile and midline.  Nose - External contour is symmetric, no gross deflection or scars.  Nasal mucosa is covered with thick cloudy drainage and bloody crusts. I took a culture.    A/P - Robert Carolina is a 65 year old male  (R22.1) Neck mass  (primary encounter diagnosis)  I will arrange for a Core needle biopsy on this midline  neck mass.  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  But we need to rule out malignancy.    (J32.4) Chronic pansinusitis  His nose has regressed severely, despite daily Gent Dex irrigations.  I have gotten cultures today, and will call with results and a plan.  Perhaps we need a new CT sinus as well.          Again, thank you for allowing me to participate in the care of your patient.        Sincerely,        Colton Wood MD

## 2021-04-16 NOTE — PATIENT INSTRUCTIONS
Scheduling Information  To schedule your CT/MRI scan, please contact [facility] at [phone] OR [facility] at [phone]    To schedule your Surgery, please contact our Specialty Schedulers at [phone]      ENT Clinic Locations Clinic Hours Telephone Number     [facility]  [address] [place] [address]   Monday:           1:00pm -- 5:00pm    Friday:              8:00am - 12:00pm   To schedule/reschedule an appointment with   Dr. Wood,   please contact our   Specialty Scheduling Department at:     [phone]       [facility]  [address] [place] [address] Tuesday:          8:00am -- 2:00pm         Urgent Care Locations Clinic Hours Telephone Numbers     [facility]  [address] [place] [address]     Monday-Friday:     11:00am - 9:00pm    Saturday-Sunday:  9:00am - 5:00pm   [phone]     [facility]  [address] [place] [address]     Monday-Friday:      5:00pm - 9:00pm     Saturday-Sunday:  9:00am - 5:00pm   [phone]

## 2021-04-17 ENCOUNTER — HEALTH MAINTENANCE LETTER (OUTPATIENT)
Age: 66
End: 2021-04-17

## 2021-04-19 DIAGNOSIS — J32.4 CHRONIC PANSINUSITIS: Primary | ICD-10-CM

## 2021-04-19 DIAGNOSIS — J33.9 NASAL POLYPOSIS: ICD-10-CM

## 2021-04-19 LAB
BACTERIA SPEC CULT: ABNORMAL
BACTERIA SPEC CULT: ABNORMAL
Lab: ABNORMAL
SPECIMEN SOURCE: ABNORMAL

## 2021-04-19 RX ORDER — CLARITHROMYCIN 500 MG
500 TABLET ORAL 2 TIMES DAILY
Qty: 20 TABLET | Refills: 1 | Status: SHIPPED | OUTPATIENT
Start: 2021-04-19 | End: 2021-04-29

## 2021-04-19 NOTE — PROGRESS NOTES
Called and left a message with his wife Lawanda.  The culture shows that his sinus bacteria have become resistant, so I will change him to Cipro irrigations, and stop the Gentamicin.  Also, to start things quickly I have prescribed Biaxin for 10 days.    Follow-up in a month so I can make sure things are going well

## 2021-04-23 LAB
BACTERIA SPEC CULT: ABNORMAL
BACTERIA SPEC CULT: ABNORMAL
Lab: ABNORMAL
SPECIMEN SOURCE: ABNORMAL

## 2021-04-29 ENCOUNTER — OFFICE VISIT (OUTPATIENT)
Dept: SURGERY | Facility: CLINIC | Age: 66
End: 2021-04-29
Attending: OTOLARYNGOLOGY
Payer: COMMERCIAL

## 2021-04-29 VITALS
HEART RATE: 62 BPM | WEIGHT: 156 LBS | DIASTOLIC BLOOD PRESSURE: 78 MMHG | SYSTOLIC BLOOD PRESSURE: 146 MMHG | BODY MASS INDEX: 22.71 KG/M2

## 2021-04-29 DIAGNOSIS — R22.1 NECK MASS: ICD-10-CM

## 2021-04-29 PROCEDURE — 99202 OFFICE O/P NEW SF 15 MIN: CPT | Performed by: SURGERY

## 2021-04-29 NOTE — PROGRESS NOTES
Patient sent in for an FNA. Patient was placed on antibiotics for his sinuses. However, the level 1 mass improved with antibiotics hard to even feel    Using an ultrasound machine multiple gray scale images of the neck were obtained in both the transverse and longitudinal planes with the patient supine in the examination chair after informed consent was obtained. This revealed the previous submental mass had decreased to less than 0.5 cm in greatest dimension. It was just deep to the skin.    Assessment:  Submental mass       Plan:   No biopsy recommended at this time will discuss with Dr Wood.   Patient to follow-up with Dr Wood if it starts to grow again, becomes tender, or he notices signs of infection (redness, fever, chills, etc)    Review of the result(s) of each unique test - US and CT scan    I performed an in office ultrasound and compared it to the previous US    16 minutes spent on the date of the encounter doing chart review, review of test results, interpretation of tests, patient visit, and documentation

## 2021-04-29 NOTE — LETTER
4/29/2021         RE: Robert Carolina  [address] [place] [address]        Dear Colleague,    Thank you for referring your patient, Robert Carolina, to the [facility]. Please see a copy of my visit note below.    Patient sent in for an FNA. Patient was placed on antibiotics for his sinuses. However, the level 1 mass improved with antibiotics hard to even feel    Using an ultrasound machine multiple gray scale images of the neck were obtained in both the transverse and longitudinal planes with the patient supine in the examination chair after informed consent was obtained. This revealed the previous submental mass had decreased to less than 0.5 cm in greatest dimension. It was just deep to the skin.    Assessment:  Submental mass       Plan:   No biopsy recommended at this time will discuss with Dr Wood.   Patient to follow-up with Dr Wood if it starts to grow again, becomes tender, or he notices signs of infection (redness, fever, chills, etc)    Review of the result(s) of each unique test - US and CT scan    I performed an in office ultrasound and compared it to the previous US    16 minutes spent on the date of the encounter doing chart review, review of test results, interpretation of tests, patient visit, and documentation                      Again, thank you for allowing me to participate in the care of your patient.        Sincerely,        Kiran Thurston, DO

## 2021-05-06 ENCOUNTER — OFFICE VISIT (OUTPATIENT)
Dept: FAMILY MEDICINE | Facility: CLINIC | Age: 66
End: 2021-05-06
Payer: COMMERCIAL

## 2021-05-06 VITALS
OXYGEN SATURATION: 98 % | SYSTOLIC BLOOD PRESSURE: 136 MMHG | HEIGHT: 70 IN | HEART RATE: 78 BPM | DIASTOLIC BLOOD PRESSURE: 74 MMHG | BODY MASS INDEX: 22.19 KG/M2 | WEIGHT: 155 LBS

## 2021-05-06 DIAGNOSIS — Z00.00 ENCOUNTER FOR MEDICARE ANNUAL WELLNESS EXAM: Primary | ICD-10-CM

## 2021-05-06 DIAGNOSIS — N40.1 NOCTURIA ASSOCIATED WITH BENIGN PROSTATIC HYPERPLASIA: ICD-10-CM

## 2021-05-06 DIAGNOSIS — Z12.5 SCREENING FOR PROSTATE CANCER: ICD-10-CM

## 2021-05-06 DIAGNOSIS — E11.65 TYPE 2 DIABETES MELLITUS WITH HYPERGLYCEMIA, WITHOUT LONG-TERM CURRENT USE OF INSULIN (H): ICD-10-CM

## 2021-05-06 DIAGNOSIS — R05.3 CHRONIC COUGH: ICD-10-CM

## 2021-05-06 DIAGNOSIS — R35.1 NOCTURIA ASSOCIATED WITH BENIGN PROSTATIC HYPERPLASIA: ICD-10-CM

## 2021-05-06 DIAGNOSIS — E11.9 TYPE 2 DIABETES MELLITUS WITHOUT COMPLICATION, WITHOUT LONG-TERM CURRENT USE OF INSULIN (H): ICD-10-CM

## 2021-05-06 DIAGNOSIS — E78.5 HYPERLIPIDEMIA LDL GOAL <100: ICD-10-CM

## 2021-05-06 DIAGNOSIS — I10 HYPERTENSION, GOAL BELOW 140/90: ICD-10-CM

## 2021-05-06 DIAGNOSIS — E06.3 HYPOTHYROIDISM DUE TO HASHIMOTO'S THYROIDITIS: ICD-10-CM

## 2021-05-06 LAB
CHOLEST SERPL-MCNC: 147 MG/DL
CREAT UR-MCNC: 138 MG/DL
GLUCOSE SERPL-MCNC: 125 MG/DL (ref 70–99)
HBA1C MFR BLD: 6.8 % (ref 0–5.6)
HDLC SERPL-MCNC: 56 MG/DL
LDLC SERPL CALC-MCNC: 78 MG/DL
MICROALBUMIN UR-MCNC: 8 MG/L
MICROALBUMIN/CREAT UR: 5.47 MG/G CR (ref 0–17)
NONHDLC SERPL-MCNC: 91 MG/DL
PSA SERPL-ACNC: 1.67 UG/L (ref 0–4)
TRIGL SERPL-MCNC: 67 MG/DL
TSH SERPL DL<=0.005 MIU/L-ACNC: 1.07 MU/L (ref 0.4–4)

## 2021-05-06 PROCEDURE — 84443 ASSAY THYROID STIM HORMONE: CPT | Performed by: FAMILY MEDICINE

## 2021-05-06 PROCEDURE — G0103 PSA SCREENING: HCPCS | Performed by: FAMILY MEDICINE

## 2021-05-06 PROCEDURE — 82043 UR ALBUMIN QUANTITATIVE: CPT | Performed by: FAMILY MEDICINE

## 2021-05-06 PROCEDURE — 80061 LIPID PANEL: CPT | Performed by: FAMILY MEDICINE

## 2021-05-06 PROCEDURE — 99397 PER PM REEVAL EST PAT 65+ YR: CPT | Mod: 25 | Performed by: FAMILY MEDICINE

## 2021-05-06 PROCEDURE — 83036 HEMOGLOBIN GLYCOSYLATED A1C: CPT | Performed by: FAMILY MEDICINE

## 2021-05-06 PROCEDURE — 82947 ASSAY GLUCOSE BLOOD QUANT: CPT | Performed by: FAMILY MEDICINE

## 2021-05-06 PROCEDURE — 90471 IMMUNIZATION ADMIN: CPT | Performed by: FAMILY MEDICINE

## 2021-05-06 PROCEDURE — 90732 PPSV23 VACC 2 YRS+ SUBQ/IM: CPT | Performed by: FAMILY MEDICINE

## 2021-05-06 PROCEDURE — 36415 COLL VENOUS BLD VENIPUNCTURE: CPT | Performed by: FAMILY MEDICINE

## 2021-05-06 RX ORDER — METFORMIN HCL 500 MG
500 TABLET, EXTENDED RELEASE 24 HR ORAL
Qty: 90 TABLET | Refills: 3 | Status: SHIPPED | OUTPATIENT
Start: 2021-05-06

## 2021-05-06 RX ORDER — LEVOTHYROXINE SODIUM 100 UG/1
TABLET ORAL
Qty: 90 TABLET | Refills: 3 | Status: SHIPPED | OUTPATIENT
Start: 2021-05-06

## 2021-05-06 RX ORDER — TERAZOSIN 2 MG/1
CAPSULE ORAL
Qty: 180 CAPSULE | Refills: 3 | Status: SHIPPED | OUTPATIENT
Start: 2021-05-06 | End: 2021-09-16

## 2021-05-06 RX ORDER — OMEPRAZOLE 40 MG/1
40 CAPSULE, DELAYED RELEASE ORAL DAILY
Qty: 90 CAPSULE | Refills: 3 | Status: SHIPPED | OUTPATIENT
Start: 2021-05-06 | End: 2023-10-09

## 2021-05-06 RX ORDER — CLARITHROMYCIN 500 MG
500 TABLET ORAL 2 TIMES DAILY
COMMUNITY
End: 2022-07-11

## 2021-05-06 RX ORDER — PRAVASTATIN SODIUM 40 MG
40 TABLET ORAL DAILY
Qty: 90 TABLET | Refills: 3 | Status: SHIPPED | OUTPATIENT
Start: 2021-05-06 | End: 2022-07-20

## 2021-05-06 ASSESSMENT — PAIN SCALES - GENERAL: PAINLEVEL: NO PAIN (0)

## 2021-05-06 ASSESSMENT — MIFFLIN-ST. JEOR: SCORE: 1481.39

## 2021-05-06 NOTE — PROGRESS NOTES
"  SUBJECTIVE:   Robert Carolina is a 65 year old male who presents for Preventive Visit.      Patient has been advised of split billing requirements and indicates understanding: Yes  Are you in the first 12 months of your Medicare Part B coverage?  Yes,  Visual Acuity:  Right Eye: 20/32   Left Eye: 20/32  Both Eyes: 20/32    Physical Health:    In general, how would you rate your overall physical health? good    Outside of work, how many days during the week do you exercise? none    Outside of work, approximately how many minutes a day do you exercise?not applicable    If you drink alcohol do you typically have >3 drinks per day or >7 drinks per week? No    Do you usually eat at least 4 servings of fruit and vegetables a day, include whole grains & fiber and avoid regularly eating high fat or \"junk\" foods? NO 3    Do you have any problems taking medications regularly?  No    Do you have any side effects from medications? none    Needs assistance for the following daily activities: no assistance needed    Which of the following safety concerns are present in your home?  none identified     Hearing impairment: Yes,     In the past 6 months, have you been bothered by leaking of urine? no    Mental Health:    In general, how would you rate your overall mental or emotional health? good  PHQ-2 Score:      Do you feel safe in your environment? YES    Have you ever done Advance Care Planning? (For example, a Health Directive, POLST, or a discussion with a medical provider or your loved ones about your wishes): No, advance care planning information given to patient to review.  Patient plans to discuss their wishes with loved ones or provider.      Additional concerns to address?  No    Fall risk:  Fallen 2 or more times in the past year?: No  Any fall with injury in the past year?: No    Cognitive Screenin) Repeat 3 items (Leader, Season, Table)  YES  2) Clock draw: NORMAL  3) 3 item recall: Recalls 1 object   Results: " NORMAL clock, 1 items recalled: COGNITIVE IMPAIRMENT LESS LIKELY    Mini-CogTM Copyright MASOUD Mane. Licensed by the author for use in [facility]; reprinted with permission (hanna@[facility]). All rights reserved.      Do you have sleep apnea, excessive snoring or daytime drowsiness?: yes        Diabetes Follow-up    How often are you checking your blood sugar? One time daily  What time of day are you checking your blood sugars (select all that apply)?  Before and after meals  Have you had any blood sugars above 200?  No  Have you had any blood sugars below 70?  No    What symptoms do you notice when your blood sugar is low?  None    What concerns do you have today about your diabetes? None     Do you have any of these symptoms? (Select all that apply)  No numbness or tingling in feet.  No redness, sores or blisters on feet.  No complaints of excessive thirst.  No reports of blurry vision.  No significant changes to weight.    Have you had a diabetic eye exam in the last 12 months? No        BP Readings from Last 2 Encounters:   05/06/21 136/74   04/29/21 (!) 146/78     Hemoglobin A1C (%)   Date Value   05/19/2020 7.3 (H)   03/21/2019 6.2 (H)     LDL Cholesterol Calculated (mg/dL)   Date Value   05/19/2020 112 (H)   03/21/2019 107 (H)         Hyperlipidemia Follow-Up      Are you regularly taking any medication or supplement to lower your cholesterol?   Yes- pravastatin    Are you having muscle aches or other side effects that you think could be caused by your cholesterol lowering medication?  No    Hypertension Follow-up      Do you check your blood pressure regularly outside of the clinic? Yes     Are you following a low salt diet? Yes    Are your blood pressures ever more than 140 on the top number (systolic) OR more   than 90 on the bottom number (diastolic), for example 140/90? No    Hypothyroidism Follow-up      Since last visit, patient describes the following symptoms: Weight stable, no hair loss, no  skin changes, no constipation, no loose stools      Reviewed and updated as needed this visit by clinical staff  Tobacco  Allergies  Meds  Problems  Med Hx  Surg Hx  Fam Hx  Soc Hx          Reviewed and updated as needed this visit by Provider  Tobacco   Meds  Problems            Social History     Tobacco Use     Smoking status: Never Smoker     Smokeless tobacco: Never Used   Substance Use Topics     Alcohol use: Yes     Comment: 5 per week                           Current providers sharing in care for this patient include:   Patient Care Team:  Renee Byrd MD as PCP - General (Family Practice)  Renee Byrd MD as Assigned PCP  Colton Wood MD as Assigned Surgical Provider    The following health maintenance items are reviewed in Epic and correct as of today:  Health Maintenance   Topic Date Due     DIABETIC FOOT EXAM  Never done     EYE EXAM  Never done     ZOSTER IMMUNIZATION (2 of 3) 06/15/2015     AORTIC ANEURYSM SCREENING (SYSTEM ASSIGNED)  Never done     COLORECTAL CANCER SCREENING  03/22/2020     FALL RISK ASSESSMENT  05/11/2021     A1C  08/06/2021     DTAP/TDAP/TD IMMUNIZATION (4 - Td) 12/02/2021     BMP  04/09/2022     CBC  04/09/2022     MEDICARE ANNUAL WELLNESS VISIT  05/06/2022     LIPID  05/06/2022     MICROALBUMIN  05/06/2022     ANNUAL REVIEW OF HM ORDERS  05/06/2022     ADVANCE CARE PLANNING  05/06/2026     HEPATITIS C SCREENING  Completed     PHQ-2  Completed     INFLUENZA VACCINE  Completed     Pneumococcal Vaccine: Pediatrics (0 to 5 Years) and At-Risk Patients (6 to 64 Years)  Completed     Pneumococcal Vaccine: 65+ Years  Completed     COVID-19 Vaccine  Completed     IPV IMMUNIZATION  Aged Out     MENINGITIS IMMUNIZATION  Aged Out     Lab work is in process  Labs reviewed in EPIC  BP Readings from Last 3 Encounters:   05/06/21 136/74   04/29/21 (!) 146/78   04/16/21 130/77    Wt Readings from Last 3 Encounters:   05/06/21 70.3 kg (155 lb)   04/29/21 70.8 kg  (156 lb)   04/16/21 72.1 kg (159 lb)                  Patient Active Problem List   Diagnosis     Nasal polyposis     Chronic sinusitis     Advanced directives, counseling/discussion     SCOTT (obstructive sleep apnea)-Mild (AHI 6)     Nevus of multiple sites of trunk     Hypothyroidism due to Hashimoto's thyroiditis     Dupuytren's contracture of both hands     Personal history of gastric ulcer     Nocturia associated with benign prostatic hyperplasia     Hypertension, goal below 140/90     Family hx of prostate cancer     Diabetes mellitus, type 2 (H)     Hyperlipidemia LDL goal <100     Past Surgical History:   Procedure Laterality Date     ARTHROSCOPY KNEE RT/LT      LT - Meniscus injury, Dr. Miguel LITTLEJOHN ORAL SURGERY PROCEDURE      [place] teeth     OPTICAL TRACKING SYSTEM ENDOSCOPIC SINUS SURGERY  7/21/2011    Procedure:OPTICAL TRACKING SYSTEM ENDOSCOPIC SINUS SURGERY; image guided endoscopic septoplasty, bilateral ethmoidectomy, bilateral maxillary antrostomy, polypectomy,bilateral sphenoid sinusotomies, bilateral frontal sinusotomies; Surgeon:PAT HOLDER; Location:[facility] OR     SINUS SURGERY  2009     [facility] NONSPECIFIC PROCEDURE      Stapled Stomach - due to ulcer       Social History     Tobacco Use     Smoking status: Never Smoker     Smokeless tobacco: Never Used   Substance Use Topics     Alcohol use: Yes     Comment: 5 per week     Family History   Problem Relation Age of Onset     Diabetes Mother      Skin Cancer Father      Prostate Cancer Brother      Melanoma No family hx of          Current Outpatient Medications   Medication Sig Dispense Refill     ALAVERT OR once daily       Bismuth Subsalicylate (PEPTO-BISMOL PO) Take  by mouth as needed.       clarithromycin (BIAXIN) 500 MG tablet Take 500 mg by mouth 2 times daily       COMPOUNDED NON-CONTROLLED SUBSTANCE (CMPD RX) - PHARMACY TO MIX COMPOUNDED MEDICATION Apply 20 mLs into each nare 2 times daily Cipro/Dexamethasone 1gram/120mg/liter saline  "2000 mL 11     COMPOUNDED NON-CONTROLLED SUBSTANCE (CMPD RX) - PHARMACY TO MIX COMPOUNDED MEDICATION Apply 20 mLs into each nare 2 times daily Itraconazole 0.01% solution 2000 mL 6     levothyroxine (SYNTHROID/LEVOTHROID) 100 MCG tablet TAKE 1 TABLET(100 MCG) BY MOUTH DAILY 90 tablet 3     metFORMIN (GLUCOPHAGE-XR) 500 MG 24 hr tablet Take 1 tablet (500 mg) by mouth daily (with dinner) 90 tablet 3     OCUVITE OR once daily       omeprazole (PRILOSEC) 40 MG DR capsule Take 1 capsule (40 mg) by mouth daily 90 capsule 3     pravastatin (PRAVACHOL) 40 MG tablet Take 1 tablet (40 mg) by mouth daily 90 tablet 3     terazosin (HYTRIN) 2 MG capsule TAKE 2 CAPSULES(4 MG) BY MOUTH AT BEDTIME 180 capsule 3     TYLENOL 325 MG PO TABS 4 time per week       Allergies   Allergen Reactions     Cefuroxime      Other reaction(s): Stomach Upset     Ceftin GI Disturbance     Oxycodone-Acetaminophen Rash     Recent Labs   Lab Test 04/09/21  1135 05/19/20  0852 03/21/19  0737 01/18/18  0811 01/18/18  0811 01/09/17  0853   A1C  --  7.3* 6.2*  --  6.0 5.9   LDL  --  112* 107*  --   --  128*   HDL  --  56 56  --   --  63   TRIG  --  70 89  --   --  78   ALT  --   --  34  --   --   --    CR 0.95 0.92 0.85  --  0.88 1.07   GFRESTIMATED 82 87 >90  --  88 70   GFRESTBLACK >90 >90 >90  --  >90 85   POTASSIUM 4.0 3.6 4.2  --  3.7 4.3   TSH  --  3.20 3.16   < > 6.70* 6.03*    < > = values in this interval not displayed.      Pneumonia Vaccine:Adults age 65+ who received Pneumovax (PPSV23) at 65 years or older: Should be given PCV13 > 1 year after their most recent PPSV23    ROS:  Constitutional, HEENT, cardiovascular, pulmonary, gi and gu systems are negative, except as otherwise noted.    OBJECTIVE:   /74 (BP Location: Left arm, Patient Position: Sitting, Cuff Size: Adult Regular)   Pulse 78   Ht 1.765 m (5' 9.5\")   Wt 70.3 kg (155 lb)   SpO2 98%   BMI 22.56 kg/m   Estimated body mass index is 22.56 kg/m  as calculated from the " "following:    Height as of this encounter: 1.765 m (5' 9.5\").    Weight as of this encounter: 70.3 kg (155 lb).  EXAM:   GENERAL: healthy, alert and no distress, is not obese  EYES: Eyes grossly normal to inspection, PERRL and conjunctivae and sclerae normal  HENT: ear canals and TM's normal, nose and mouth without ulcers or lesions  NECK: no adenopathy, no asymmetry, masses, or scars and thyroid normal to palpation  RESP: lungs clear to auscultation - no rales, rhonchi or wheezes  CV: regular rate and rhythm, normal S1 S2, no S3 or S4, no murmur, click or rub, no peripheral edema and peripheral pulses strong  ABDOMEN: soft, nontender, no hepatosplenomegaly, no masses and bowel sounds normal  MS: no gross musculoskeletal defects noted, no edema  SKIN: no suspicious lesions or rashes  NEURO: Normal strength and tone, mentation intact and speech normal  BACK: no CVA tenderness, no paralumbar tenderness  PSYCH: mentation appears normal, affect normal/bright  Diabetic foot exam: normal DP and PT pulses, no trophic changes or ulcerative lesions, normal calluses, normal sensory exam and normal monofilament exam     Diagnostic Test Results:  Labs reviewed in Epic  No results found for this or any previous visit (from the past 24 hour(s)).  Office Visit on 04/16/2021   Component Date Value Ref Range Status     Specimen Description 04/16/2021 Sinus   Final     Special Requests 04/16/2021 Specimen collected in eSwab transport (blue cap)   Final     Culture Micro 04/16/2021 *  Final                    Value:Light growth  Cutibacterium (Propionibacterium) acnes  Susceptibility testing of Cutibacterium is not done from this source. Our antibiogram   indicates that Cutibacterium species are susceptible to penicillin and cefotaxime, and   most are susceptible to clindamycin.  Sasha Bliss M.D., Medical Director       Culture Micro 04/16/2021 *  Final                    Value:Light growth  Actinomyces " "odontolyticus  Susceptibility testing not routinely done       Specimen Description 04/16/2021 Sinus   Final     Special Requests 04/16/2021 Specimen collected in eSwab transport (blue cap)   Final     Culture Micro 04/16/2021 *  Final                    Value:Light growth  Pseudomonas aeruginosa       Culture Micro 04/16/2021 *  Final                    Value:Light growth  Staphylococcus epidermidis  Susceptibility testing not routinely done            ASSESSMENT / PLAN:       ICD-10-CM    1. Encounter for Medicare annual wellness exam  Z00.00    2. Hypothyroidism due to Hashimoto's thyroiditis  E03.8 TSH with free T4 reflex    E06.3 levothyroxine (SYNTHROID/LEVOTHROID) 100 MCG tablet   3. Hypertension, goal below 140/90  I10    4. Type 2 diabetes mellitus with hyperglycemia, without long-term current use of insulin (H)  E11.65 Albumin Random Urine Quantitative with Creat Ratio     Hemoglobin A1c     Glucose   5. Hyperlipidemia LDL goal <100  E78.5 Lipid panel reflex to direct LDL Non-fasting     pravastatin (PRAVACHOL) 40 MG tablet   6. Screening for prostate cancer  Z12.5 Prostate spec antigen screen   7. Type 2 diabetes mellitus without complication, without long-term current use of insulin (H)  E11.9 metFORMIN (GLUCOPHAGE-XR) 500 MG 24 hr tablet   8. Chronic cough  R05 omeprazole (PRILOSEC) 40 MG DR capsule   9. Nocturia associated with benign prostatic hyperplasia  N40.1 terazosin (HYTRIN) 2 MG capsule    R35.1        Patient has been advised of split billing requirements and indicates understanding: No    COUNSELING:  Reviewed preventive health counseling, as reflected in patient instructions       Regular exercise       Healthy diet/nutrition       Vision screening       Dental care    Estimated body mass index is 22.56 kg/m  as calculated from the following:    Height as of this encounter: 1.765 m (5' 9.5\").    Weight as of this encounter: 70.3 kg (155 lb).        He reports that he has never smoked. He has " never used smokeless tobacco.    Appropriate preventive services were discussed with this patient, including applicable screening as appropriate for cardiovascular disease, diabetes, osteopenia/osteoporosis, and glaucoma.  As appropriate for age/gender, discussed screening for colorectal cancer, prostate cancer, breast cancer, and cervical cancer. Checklist reviewing preventive services available has been given to the patient.    Reviewed patients plan of care and provided an AVS. The Basic Care Plan (routine screening as documented in Health Maintenance) for Robert meets the Care Plan requirement. This Care Plan has been established and reviewed with the Patient.    Counseling Resources:  ATP IV Guidelines  Pooled Cohorts Equation Calculator  Breast Cancer Risk Calculator  BRCA-Related Cancer Risk Assessment: FHS-7 Tool  FRAX Risk Assessment  ICSI Preventive Guidelines  Dietary Guidelines for Americans, 2010  USDA's MyPlate  ASA Prophylaxis  Lung CA Screening    Renee Byrd MD  [facility]

## 2021-05-06 NOTE — PATIENT INSTRUCTIONS
At [facility], we strive to deliver an exceptional experience to you, every time we see you. If you receive a survey, please complete it as we do value your feedback.  If you have MyChart, you can expect to receive results automatically within 24 hours of their completion.  Your provider will send a note interpreting your results as well.   If you do not have MyChart, you should receive your results in about a week by mail.    Your care team:                            Family Medicine Internal Medicine   MD Kadeem Pruitt MD Shantel Branch-Fleming, MD Srinivasa Vaka, MD Katya Belousova, PALARON Morrell, APRN CNP    Giovani Leger, MD Pediatrics   Samy Salazar, PALARON Pettit, CNP MD Gisell Patino APRN CNP   MD Margie Merritt MD Deborah Mielke, MD Neeta Castro, APRN [facility]      Clinic hours: Monday - Thursday 7 am-6 pm; Fridays 7 am-5 pm.   Urgent care: Monday - Friday 10 am- 8 pm; Saturday and Sunday 9 am-5 pm.    Clinic: [phone]       [facility]: Monday - Thursday 8 am - 7 pm; Friday 8 am - 6 pm  [facility]: [phone]     Use [email or web address] for 24/7 diagnosis and treatment of dozens of conditions.    Patient Education   Personalized Prevention Plan  You are due for the preventive services outlined below.  Your care team is available to assist you in scheduling these services.  If you have already completed any of these items, please share that information with your care team to update in your medical record.  Health Maintenance Due   Topic Date Due     Diabetic Foot Exam  Never done     ANNUAL REVIEW OF HM ORDERS  Never done     Eye Exam  Never done     Pneumococcal Vaccine (1 of 2 - PPSV23) Never done     Pneumococcal Vaccine (1 of 2 - PPSV23) Never done     Zoster (Shingles) Vaccine (2 of 3) 06/15/2015     Discuss Advance Care Planning  11/01/2017      AORTIC ANEURYSM SCREENING (SYSTEM ASSIGNED)  Never done     Annual Wellness Visit  03/21/2020     Kidney Microalbumin Urine Test  03/21/2020     Colorectal Cancer Screening  03/22/2020     A1C Lab  08/19/2020     PHQ-2  01/01/2021     FALL RISK ASSESSMENT  05/11/2021     Cholesterol Lab  05/19/2021

## 2021-05-06 NOTE — PROGRESS NOTES
Prior to immunization administration, verified patients identity using patient s name and date of birth. Please see Immunization Activity for additional information.     Screening Questionnaire for Adult Immunization    Are you sick today?   No   Do you have allergies to medications, food, a vaccine component or latex?   No   Have you ever had a serious reaction after receiving a vaccination?   No   Do you have a long-term health problem with heart, lung, kidney, or metabolic disease (e.g., diabetes), asthma, a blood disorder, no spleen, complement component deficiency, a cochlear implant, or a spinal fluid leak?  Are you on long-term aspirin therapy?   No   Do you have cancer, leukemia, HIV/AIDS, or any other immune system problem?   No   Do you have a parent, brother, or sister with an immune system problem?   No   In the past 3 months, have you taken medications that affect  your immune system, such as prednisone, other steroids, or anticancer drugs; drugs for the treatment of rheumatoid arthritis, Crohn s disease, or psoriasis; or have you had radiation treatments?   No   Have you had a seizure, or a brain or other nervous system problem?   No   During the past year, have you received a transfusion of blood or blood    products, or been given immune (gamma) globulin or antiviral drug?   No   For women: Are you pregnant or is there a chance you could become       pregnant during the next month?   No   Have you received any vaccinations in the past 4 weeks?   No     Immunization questionnaire was positive for at least one answer.  Notified Carson.        Per orders of Dr. Byrd, injection of Pneumococcal 23 given by Tg Shen. Patient instructed to remain in clinic for 15 minutes afterwards, and to report any adverse reaction to me immediately.       Screening performed by Tg Shen MA on 5/6/2021 at 11:52 AM.

## 2021-05-10 NOTE — RESULT ENCOUNTER NOTE
Dear Robert    Your test results are attached. I am happy to let you know that they are stable.    The diabetes test looks great at 6.8%. The test for prostate cancer was normal and shows low risk. The thyroid test is normal. The cholesterol looks great also. We can recheck labs in 6 months.     Please contact me by [address] if you have any questions about your labs or management. You may also call my office number [phone] for any questions.     Renee Byrd MD

## 2021-05-16 DIAGNOSIS — E78.5 HYPERLIPIDEMIA LDL GOAL <100: ICD-10-CM

## 2021-05-17 RX ORDER — PRAVASTATIN SODIUM 40 MG
TABLET ORAL
Qty: 90 TABLET | Refills: 3 | OUTPATIENT
Start: 2021-05-17

## 2021-09-26 ENCOUNTER — HEALTH MAINTENANCE LETTER (OUTPATIENT)
Age: 66
End: 2021-09-26

## 2022-01-16 ENCOUNTER — HEALTH MAINTENANCE LETTER (OUTPATIENT)
Age: 67
End: 2022-01-16

## 2022-01-18 NOTE — PROGRESS NOTES
History of Present Illness - Robert Carolina is a very pleasant 66 year old male last seen on 4/16/2021    He is status post functinoal endoscopic sinus surgery for polyposis on 7/21/2011.    To review the year 2013, on exam at the 6/10/2013 visit, things looked good, with some residual disease on the LEFT.  I had him continue the rinses and at the visit on 9/16/13 visit there was still some polypoid disease on the LEFT side.    At the 12/16/13 visit things looked the best I had seen in a year, and I asked him to continue the same irrigation regimen, and also discussed adding surfactant.  He did not try the surfactant at that point, and tells me that unfortunately things had taken a slight turn for the worse at the beginning of 2014.  He could tell because he feels more congested and has a bit of a sore throat from increased post nasal drainage.  And at the 3/17/2014 visit there was clearly endoscopic evidence of purulent disease on the RIGHT side.  I added itraconazole to his Gent and Dex regimen, and also treated with 14 days of bactrim at the 3/17/14 visit.    At the 12/15/14 visit he told me that the only change he had made was adding a drop of baby shampoo to his sinus saline irrigations, and he is happy to report that he thinks it is helping.  His nose is still feeling great, much more open and clear, and there has been no facial pressure.    At the 6/23/2014 visit endoscopy showed his nose looked the best it had in years.    At 12/15/14 visit and the 4/13/15 visit, no changes, still using the rinses with baby shampoo in a saline, as well as medicated rinses 4x/Week.  Unfortunately at the 10/12/15 visit, there was a lot of purulent drainage seen on the LEFT.  SO we doubled the use of the compound irrigations and that got things under control.  So overall in 2015 and 2016, he has maintained himself on really nothing more than saline NeilMed irrigations with a bit of baby shampoo.    At the 8/22/17 visit, he  also had issues with a chronic tickling dry cough.  It seemed to be laryngopharyngeal reflux< so I started reflux medication and he is also here to follow up on that issue as well.  He tells me that the reflux medicaiton didn't seem to help, so he is trying dietary modifications.    At the 8//22/2017 exam, I did find a small early polyp on the LEFT side.  We continued medications, but he did end up having a full blown sinusitis in October.  Since that time, and also at the visit on 12/12/17, there was persistent polyp growth on the LEFT, and then again on the RIGHT.  Treatment with medicated irrigations, but there was still recurrent polyposis, and so at the end of the 12/12 visit I added Budesonide in NeilMed irrigations.      But at this point, he is only on Gent Dex irrigations daily, but his nose is still a problem on occasion with thick and occasionally bloody drainage.  But at the April 2021 visit, he also had a new issue of a submental neck mass.  CT was done and showed what was most consistent with reactive node, no other masses. But given the size, I did request a needle biopsy done, but as far as I can tell that was not done.    He is back for his annual visit.  He tells me that about a month ago he started having congestion and bloody crusting, so he took an old prescription of Biaxin about 2-3 weeks ago.  He tells me that it helped, but not completely.  He has been on Gent Dex irrigations daily, as well as saline rinses with baby shampoo.    Past Medical History -   Patient Active Problem List   Diagnosis     Nasal polyposis     Chronic sinusitis     Advanced directives, counseling/discussion     SCOTT (obstructive sleep apnea)-Mild (AHI 6)     Nevus of multiple sites of trunk     Hypothyroidism due to Hashimoto's thyroiditis     Dupuytren's contracture of both hands     Personal history of gastric ulcer     Nocturia associated with benign prostatic hyperplasia     Hypertension, goal below 140/90      Family hx of prostate cancer     Diabetes mellitus, type 2 (H)     Hyperlipidemia LDL goal <100       Current Medications -   Current Outpatient Medications:      ALAVERT OR, once daily, Disp: , Rfl:      Bismuth Subsalicylate (PEPTO-BISMOL PO), Take  by mouth as needed., Disp: , Rfl:      clarithromycin (BIAXIN) 500 MG tablet, Take 500 mg by mouth 2 times daily, Disp: , Rfl:      COMPOUNDED NON-CONTROLLED SUBSTANCE (CMPD RX) - PHARMACY TO MIX COMPOUNDED MEDICATION, Apply 20 mLs into each nare 2 times daily Cipro/Dexamethasone 1gram/120mg/liter saline, Disp: 2000 mL, Rfl: 11     COMPOUNDED NON-CONTROLLED SUBSTANCE (CMPD RX) - PHARMACY TO MIX COMPOUNDED MEDICATION, Apply 20 mLs into each nare 2 times daily Itraconazole 0.01% solution, Disp: 2000 mL, Rfl: 6     levothyroxine (SYNTHROID/LEVOTHROID) 100 MCG tablet, TAKE 1 TABLET(100 MCG) BY MOUTH DAILY, Disp: 90 tablet, Rfl: 3     metFORMIN (GLUCOPHAGE-XR) 500 MG 24 hr tablet, Take 1 tablet (500 mg) by mouth daily (with dinner), Disp: 90 tablet, Rfl: 3     OCUVITE OR, once daily, Disp: , Rfl:      omeprazole (PRILOSEC) 40 MG DR capsule, Take 1 capsule (40 mg) by mouth daily, Disp: 90 capsule, Rfl: 3     pravastatin (PRAVACHOL) 40 MG tablet, Take 1 tablet (40 mg) by mouth daily, Disp: 90 tablet, Rfl: 3     terazosin (HYTRIN) 5 MG capsule, Take 1 capsule (5 mg) by mouth At Bedtime, Disp: 90 capsule, Rfl: 1     TYLENOL 325 MG PO TABS, 4 time per week, Disp: , Rfl:     Allergies -   Allergies   Allergen Reactions     Cefuroxime      Other reaction(s): Stomach Upset     Ceftin GI Disturbance     Oxycodone-Acetaminophen Rash       Social History -   Social History     Socioeconomic History     Marital status:      Spouse name: Not on file     Number of children: Not on file     Years of education: Not on file     Highest education level: Not on file   Occupational History     Not on file   Social Needs     Financial resource strain: Not on file     Food insecurity      Worry: Not on file     Inability: Not on file     Transportation needs     Medical: Not on file     Non-medical: Not on file   Tobacco Use     Smoking status: Never Smoker     Smokeless tobacco: Never Used   Substance and Sexual Activity     Alcohol use: Yes     Comment: 5 per week     Drug use: No     Sexual activity: Yes     Partners: Female     Birth control/protection: None   Lifestyle     Physical activity     Days per week: Not on file     Minutes per session: Not on file     Stress: Not on file   Relationships     Social connections     Talks on phone: Not on file     Gets together: Not on file     Attends [employer] service: Not on file     Active member of club or organization: Not on file     Attends meetings of clubs or organizations: Not on file     Relationship status: Not on file     Intimate partner violence     Fear of current or ex partner: Not on file     Emotionally abused: Not on file     Physically abused: Not on file     Forced sexual activity: Not on file   Other Topics Concern     Parent/sibling w/ CABG, MI or angioplasty before 65F 55M? Not Asked   Social History Narrative     Not on file       Family History -   Family History   Problem Relation Age of Onset     Diabetes Mother      Skin Cancer Father      Prostate Cancer Brother      Melanoma No family hx of        Review of Systems - As per HPI and PMHx, otherwise 10+ system review of the head and neck, and general constitution is negative.    Physical Exam  BP (!) 153/75 (BP Location: Right arm, Patient Position: Sitting, Cuff Size: Adult Regular)   Pulse 91   Wt 70.8 kg (156 lb)   SpO2 96%   BMI 22.71 kg/m      General - The patient is well nourished and well developed, and appears to have good nutritional status.  Alert and oriented to person and place, answers questions and cooperates with examination appropriately.   Head and Face - Normocephalic and atraumatic, with no gross asymmetry noted of the contour of the facial features.   The facial nerve is intact, with strong symmetric movements.  Voice and Breathing - The patient was breathing comfortably without the use of accessory muscles. There was no wheezing, stridor, or stertor.  The patients voice was clear and strong, and had appropriate pitch and quality.  Ears - The tympanic membranes are normal in appearance, bony landmarks are intact.  No retraction, perforation, or masses.  No fluid or purulence was seen in the external canal or the middle ear. No evidence of infection of the middle ear or external canal, cerumen was normal in appearance.  Eyes - Extraocular movements intact, and the pupils were reactive to light.  Sclera were not icteric or injected, conjunctiva were pink and moist.      PROCEDURE  To evaluate the nose in the postoperative state I performed rigid nasal endoscopy.  I first sprayed with lidocaine and neosynephrine.  I began with the LEFT side using a 2.7mm 30 degree rigid nasal endoscope, and color photographs were taken for the medical record.    The middle meatus was open, and I was able to pass the scope through.  The LEFT maxillary antrostomy is open but immediately noted were prolific crusts and hemalatha purulence from the lateral wall and LEFT maxillary os.  Going further back, the ethmoid roof is nicely re-mucosalized, and there is no abnormal secretions or polypoid degeneration.    The right side was then examined.  The middle meatus was open and I visualized the RIGHT antrostomy was open. Once again the middle meatus had crusting and hemalatha purulence. Going further back the ethmoid system looks good.  The mucosa is healthy, and there were polyps or polypoid degenerations.        A/P - Robert Carolina is a 65 year old male  (J32.4) Chronic pansinusitis  (primary encounter diagnosis)  (J33.9) Nasal polyposis    Clearly we have a return of disease.  I have taken cultures today and will call Adriano once they are back with a plan of oral +/- changing the antibiotics in  the irrigations.    Until then I have started a 500mg daily dose of Levaquin.    In the event all of this fails, I will order a new sinus CT< and we may have to discuss revision functional endoscopic sinus surgery.

## 2022-01-20 ENCOUNTER — TRANSFERRED RECORDS (OUTPATIENT)
Dept: HEALTH INFORMATION MANAGEMENT | Facility: CLINIC | Age: 67
End: 2022-01-20
Payer: COMMERCIAL

## 2022-01-20 LAB — RETINOPATHY: NEGATIVE

## 2022-01-24 ENCOUNTER — OFFICE VISIT (OUTPATIENT)
Dept: OTOLARYNGOLOGY | Facility: CLINIC | Age: 67
End: 2022-01-24
Payer: COMMERCIAL

## 2022-01-24 VITALS
BODY MASS INDEX: 22.71 KG/M2 | SYSTOLIC BLOOD PRESSURE: 153 MMHG | DIASTOLIC BLOOD PRESSURE: 75 MMHG | WEIGHT: 156 LBS | OXYGEN SATURATION: 96 % | HEART RATE: 91 BPM

## 2022-01-24 DIAGNOSIS — J32.4 CHRONIC PANSINUSITIS: Primary | ICD-10-CM

## 2022-01-24 DIAGNOSIS — J33.9 NASAL POLYPOSIS: ICD-10-CM

## 2022-01-24 PROCEDURE — 31231 NASAL ENDOSCOPY DX: CPT | Performed by: OTOLARYNGOLOGY

## 2022-01-24 PROCEDURE — 87107 FUNGI IDENTIFICATION MOLD: CPT | Performed by: OTOLARYNGOLOGY

## 2022-01-24 PROCEDURE — 87076 CULTURE ANAEROBE IDENT EACH: CPT | Performed by: OTOLARYNGOLOGY

## 2022-01-24 PROCEDURE — 87075 CULTR BACTERIA EXCEPT BLOOD: CPT | Performed by: OTOLARYNGOLOGY

## 2022-01-24 PROCEDURE — 87070 CULTURE OTHR SPECIMN AEROBIC: CPT | Performed by: OTOLARYNGOLOGY

## 2022-01-24 PROCEDURE — 99214 OFFICE O/P EST MOD 30 MIN: CPT | Mod: 25 | Performed by: OTOLARYNGOLOGY

## 2022-01-24 RX ORDER — LEVOFLOXACIN 500 MG/1
500 TABLET, FILM COATED ORAL DAILY
Qty: 10 TABLET | Refills: 1 | Status: SHIPPED | OUTPATIENT
Start: 2022-01-24 | End: 2022-07-11

## 2022-01-24 NOTE — NURSING NOTE
"Chief Complaint   Patient presents with     Sinusitis     recheck sinus. not doing the best       Vitals:    01/24/22 1021   BP: (!) 153/75   BP Location: Right arm   Patient Position: Sitting   Cuff Size: Adult Regular   Pulse: 91   SpO2: 96%   Weight: 70.8 kg (156 lb)     Wt Readings from Last 1 Encounters:   01/24/22 70.8 kg (156 lb)     Ht Readings from Last 1 Encounters:   05/06/21 1.765 m (5' 9.5\")       Eleonora Ardon [facility], 1/24/2022 10:22 AM    "

## 2022-01-24 NOTE — LETTER
1/24/2022         RE: Robert Carolina  [address] [place] [address]        Dear Colleague,    Thank you for referring your patient, Robert Carolina, to the [facility]. Please see a copy of my visit note below.    History of Present Illness - Robert Carolina is a very pleasant 66 year old male last seen on 4/16/2021    He is status post functinoal endoscopic sinus surgery for polyposis on 7/21/2011.    To review the year 2013, on exam at the 6/10/2013 visit, things looked good, with some residual disease on the LEFT.  I had him continue the rinses and at the visit on 9/16/13 visit there was still some polypoid disease on the LEFT side.    At the 12/16/13 visit things looked the best I had seen in a year, and I asked him to continue the same irrigation regimen, and also discussed adding surfactant.  He did not try the surfactant at that point, and tells me that unfortunately things had taken a slight turn for the worse at the beginning of 2014.  He could tell because he feels more congested and has a bit of a sore throat from increased post nasal drainage.  And at the 3/17/2014 visit there was clearly endoscopic evidence of purulent disease on the RIGHT side.  I added itraconazole to his Gent and Dex regimen, and also treated with 14 days of bactrim at the 3/17/14 visit.    At the 12/15/14 visit he told me that the only change he had made was adding a drop of baby shampoo to his sinus saline irrigations, and he is happy to report that he thinks it is helping.  His nose is still feeling great, much more open and clear, and there has been no facial pressure.    At the 6/23/2014 visit endoscopy showed his nose looked the best it had in years.    At 12/15/14 visit and the 4/13/15 visit, no changes, still using the rinses with baby shampoo in a saline, as well as medicated rinses 4x/Week.  Unfortunately at the 10/12/15 visit, there was a lot of purulent drainage seen on the LEFT.   SO we doubled the use of the compound irrigations and that got things under control.  So overall in 2015 and 2016, he has maintained himself on really nothing more than saline NeilMed irrigations with a bit of baby shampoo.    At the 8/22/17 visit, he also had issues with a chronic tickling dry cough.  It seemed to be laryngopharyngeal reflux< so I started reflux medication and he is also here to follow up on that issue as well.  He tells me that the reflux medicaiton didn't seem to help, so he is trying dietary modifications.    At the 8//22/2017 exam, I did find a small early polyp on the LEFT side.  We continued medications, but he did end up having a full blown sinusitis in October.  Since that time, and also at the visit on 12/12/17, there was persistent polyp growth on the LEFT, and then again on the RIGHT.  Treatment with medicated irrigations, but there was still recurrent polyposis, and so at the end of the 12/12 visit I added Budesonide in NeilMed irrigations.      But at this point, he is only on Gent Dex irrigations daily, but his nose is still a problem on occasion with thick and occasionally bloody drainage.  But at the April 2021 visit, he also had a new issue of a submental neck mass.  CT was done and showed what was most consistent with reactive node, no other masses. But given the size, I did request a needle biopsy done, but as far as I can tell that was not done.    He is back for his annual visit.  He tells me that about a month ago he started having congestion and bloody crusting, so he took an old prescription of Biaxin about 2-3 weeks ago.  He tells me that it helped, but not completely.  He has been on Gent Dex irrigations daily, as well as saline rinses with baby shampoo.    Past Medical History -   Patient Active Problem List   Diagnosis     Nasal polyposis     Chronic sinusitis     Advanced directives, counseling/discussion     SCOTT (obstructive sleep apnea)-Mild (AHI 6)     Zafar rosas  multiple sites of trunk     Hypothyroidism due to Hashimoto's thyroiditis     Dupuytren's contracture of both hands     Personal history of gastric ulcer     Nocturia associated with benign prostatic hyperplasia     Hypertension, goal below 140/90     Family hx of prostate cancer     Diabetes mellitus, type 2 (H)     Hyperlipidemia LDL goal <100       Current Medications -   Current Outpatient Medications:      ALAVERT OR, once daily, Disp: , Rfl:      Bismuth Subsalicylate (PEPTO-BISMOL PO), Take  by mouth as needed., Disp: , Rfl:      clarithromycin (BIAXIN) 500 MG tablet, Take 500 mg by mouth 2 times daily, Disp: , Rfl:      COMPOUNDED NON-CONTROLLED SUBSTANCE (CMPD RX) - PHARMACY TO MIX COMPOUNDED MEDICATION, Apply 20 mLs into each nare 2 times daily Cipro/Dexamethasone 1gram/120mg/liter saline, Disp: 2000 mL, Rfl: 11     COMPOUNDED NON-CONTROLLED SUBSTANCE (CMPD RX) - PHARMACY TO MIX COMPOUNDED MEDICATION, Apply 20 mLs into each nare 2 times daily Itraconazole 0.01% solution, Disp: 2000 mL, Rfl: 6     levothyroxine (SYNTHROID/LEVOTHROID) 100 MCG tablet, TAKE 1 TABLET(100 MCG) BY MOUTH DAILY, Disp: 90 tablet, Rfl: 3     metFORMIN (GLUCOPHAGE-XR) 500 MG 24 hr tablet, Take 1 tablet (500 mg) by mouth daily (with dinner), Disp: 90 tablet, Rfl: 3     OCUVITE OR, once daily, Disp: , Rfl:      omeprazole (PRILOSEC) 40 MG DR capsule, Take 1 capsule (40 mg) by mouth daily, Disp: 90 capsule, Rfl: 3     pravastatin (PRAVACHOL) 40 MG tablet, Take 1 tablet (40 mg) by mouth daily, Disp: 90 tablet, Rfl: 3     terazosin (HYTRIN) 5 MG capsule, Take 1 capsule (5 mg) by mouth At Bedtime, Disp: 90 capsule, Rfl: 1     TYLENOL 325 MG PO TABS, 4 time per week, Disp: , Rfl:     Allergies -   Allergies   Allergen Reactions     Cefuroxime      Other reaction(s): Stomach Upset     Ceftin GI Disturbance     Oxycodone-Acetaminophen Rash       Social History -   Social History     Socioeconomic History     Marital status:      Spouse  name: Not on file     Number of children: Not on file     Years of education: Not on file     Highest education level: Not on file   Occupational History     Not on file   Social Needs     Financial resource strain: Not on file     Food insecurity     Worry: Not on file     Inability: Not on file     Transportation needs     Medical: Not on file     Non-medical: Not on file   Tobacco Use     Smoking status: Never Smoker     Smokeless tobacco: Never Used   Substance and Sexual Activity     Alcohol use: Yes     Comment: 5 per week     Drug use: No     Sexual activity: Yes     Partners: Female     Birth control/protection: None   Lifestyle     Physical activity     Days per week: Not on file     Minutes per session: Not on file     Stress: Not on file   Relationships     Social connections     Talks on phone: Not on file     Gets together: Not on file     Attends [employer]arsani service: Not on file     Active member of club or organization: Not on file     Attends meetings of clubs or organizations: Not on file     Relationship status: Not on file     Intimate partner violence     Fear of current or ex partner: Not on file     Emotionally abused: Not on file     Physically abused: Not on file     Forced sexual activity: Not on file   Other Topics Concern     Parent/sibling w/ CABG, MI or angioplasty before 65F 55M? Not Asked   Social History Narrative     Not on file       Family History -   Family History   Problem Relation Age of Onset     Diabetes Mother      Skin Cancer Father      Prostate Cancer Brother      Melanoma No family hx of        Review of Systems - As per HPI and PMHx, otherwise 10+ system review of the head and neck, and general constitution is negative.    Physical Exam  BP (!) 153/75 (BP Location: Right arm, Patient Position: Sitting, Cuff Size: Adult Regular)   Pulse 91   Wt 70.8 kg (156 lb)   SpO2 96%   BMI 22.71 kg/m      General - The patient is well nourished and well developed, and appears to  have good nutritional status.  Alert and oriented to person and place, answers questions and cooperates with examination appropriately.   Head and Face - Normocephalic and atraumatic, with no gross asymmetry noted of the contour of the facial features.  The facial nerve is intact, with strong symmetric movements.  Voice and Breathing - The patient was breathing comfortably without the use of accessory muscles. There was no wheezing, stridor, or stertor.  The patients voice was clear and strong, and had appropriate pitch and quality.  Ears - The tympanic membranes are normal in appearance, bony landmarks are intact.  No retraction, perforation, or masses.  No fluid or purulence was seen in the external canal or the middle ear. No evidence of infection of the middle ear or external canal, cerumen was normal in appearance.  Eyes - Extraocular movements intact, and the pupils were reactive to light.  Sclera were not icteric or injected, conjunctiva were pink and moist.      PROCEDURE  To evaluate the nose in the postoperative state I performed rigid nasal endoscopy.  I first sprayed with lidocaine and neosynephrine.  I began with the LEFT side using a 2.7mm 30 degree rigid nasal endoscope, and color photographs were taken for the medical record.    The middle meatus was open, and I was able to pass the scope through.  The LEFT maxillary antrostomy is open but immediately noted were prolific crusts and hemalatha purulence from the lateral wall and LEFT maxillary os.  Going further back, the ethmoid roof is nicely re-mucosalized, and there is no abnormal secretions or polypoid degeneration.    The right side was then examined.  The middle meatus was open and I visualized the RIGHT antrostomy was open. Once again the middle meatus had crusting and hemalatha purulence. Going further back the ethmoid system looks good.  The mucosa is healthy, and there were polyps or polypoid degenerations.        A/P - Robert AMANDA Carolina is a 65 year  old male  (J32.4) Chronic pansinusitis  (primary encounter diagnosis)  (J33.9) Nasal polyposis    Clearly we have a return of disease.  I have taken cultures today and will call Adriano once they are back with a plan of oral +/- changing the antibiotics in the irrigations.    Until then I have started a 500mg daily dose of Levaquin.    In the event all of this fails, I will order a new sinus CT< and we may have to discuss revision functional endoscopic sinus surgery.          Again, thank you for allowing me to participate in the care of your patient.        Sincerely,        Colton Wood MD

## 2022-01-28 LAB — BACTERIA SPEC CULT: ABNORMAL

## 2022-01-31 LAB
BACTERIA SPEC CULT: ABNORMAL
BACTERIA SPEC CULT: ABNORMAL

## 2022-02-03 DIAGNOSIS — J32.4 CHRONIC PANSINUSITIS: Primary | ICD-10-CM

## 2022-02-03 NOTE — PROGRESS NOTES
Called and spoke with Adriano and discussed culture results.  Will change to Clinda nasal irrigations.    See me in follow up in one month to see if it worked.

## 2022-02-09 ENCOUNTER — TELEPHONE (OUTPATIENT)
Dept: OTOLARYNGOLOGY | Facility: CLINIC | Age: 67
End: 2022-02-09
Payer: COMMERCIAL

## 2022-02-09 NOTE — TELEPHONE ENCOUNTER
A prior authorization is needed for the following medication prescribed.  Please complete a prior authorization with the information included below.    Medication: Clindamycin 300mg/L Nasal Irrigation-Compounding     Ingredients: Cleocin Phosphate 900mg/6ml, NDC: [address]-74, QTY: 4.000   Sodium Chloride 0.9% Soln, NDC: [address]-09, QTY: 2000.000    RX #: [id number]    Reason for Rejection: Product not on formulary     Pharmacy Insurance plan:  PART D   BIN #: [id number]  ID #: [id number]  PCN #: [id number]  Phone #: [phone]      Pharmacy NPI:[id number]      Please advise the pharmacy when the prior authorization is approved or denied.     Thank you for your time.    [facility]  [phone]

## 2022-02-16 NOTE — TELEPHONE ENCOUNTER
PA Initiation    Medication: Clindamycin 300mg/L Nasal Irrigation-Compounding   Insurance Company: [email or web address] - Phone [phone] Fax [phone]  Pharmacy Filling the Rx: [facility] - [place], [place] - [facility] [address]  Filling Pharmacy Phone: [phone]  Filling Pharmacy Fax: [phone]  Start Date: 2/16/2022    Robert Carolina (Rocha: [id number])

## 2022-02-17 ENCOUNTER — MYC MEDICAL ADVICE (OUTPATIENT)
Dept: OTOLARYNGOLOGY | Facility: CLINIC | Age: 67
End: 2022-02-17
Payer: COMMERCIAL

## 2022-02-17 DIAGNOSIS — J32.4 CHRONIC PANSINUSITIS: Primary | ICD-10-CM

## 2022-02-17 NOTE — TELEPHONE ENCOUNTER
Prior Authorization Approval    Authorization Effective Date: 1/16/2022  Authorization Expiration Date: 2/16/2023  Medication: Clindamycin 300mg/L Nasal Irrigation-Compounding   Approved Dose/Quantity:   Reference #: [id number]   Insurance Company: [employer] - Phone [phone] Fax [phone]  Which Pharmacy is filling the prescription (Not needed for infusion/clinic administered): [facility] - [place], [place] - [facility] [address]  Pharmacy Notified: Yes  Patient Notified: Yes

## 2022-03-23 NOTE — PROGRESS NOTES
History of Present Illness - Robert Carolina is a very pleasant 66 year old male last seen on 1/24/2022    He is status post functinoal endoscopic sinus surgery for polyposis on 7/21/2011.    To review the year 2013, on exam at the 6/10/2013 visit, things looked good, with some residual disease on the LEFT.  I had him continue the rinses and at the visit on 9/16/13 visit there was still some polypoid disease on the LEFT side.    At the 12/16/13 visit things looked the best I had seen in a year, and I asked him to continue the same irrigation regimen, and also discussed adding surfactant.  He did not try the surfactant at that point, and tells me that unfortunately things had taken a slight turn for the worse at the beginning of 2014.  He could tell because he feels more congested and has a bit of a sore throat from increased post nasal drainage.  And at the 3/17/2014 visit there was clearly endoscopic evidence of purulent disease on the RIGHT side.  I added itraconazole to his Gent and Dex regimen, and also treated with 14 days of bactrim at the 3/17/14 visit.    At the 12/15/14 visit he told me that the only change he had made was adding a drop of baby shampoo to his sinus saline irrigations, and he is happy to report that he thinks it is helping.  His nose is still feeling great, much more open and clear, and there has been no facial pressure.    At the 6/23/2014 visit endoscopy showed his nose looked the best it had in years.    At 12/15/14 visit and the 4/13/15 visit, no changes, still using the rinses with baby shampoo in a saline, as well as medicated rinses 4x/Week.  Unfortunately at the 10/12/15 visit, there was a lot of purulent drainage seen on the LEFT.  SO we doubled the use of the compound irrigations and that got things under control.  So overall in 2015 and 2016, he has maintained himself on really nothing more than saline NeilMed irrigations with a bit of baby shampoo.    At the 8/22/17 visit, he  also had issues with a chronic tickling dry cough.  It seemed to be laryngopharyngeal reflux< so I started reflux medication and he is also here to follow up on that issue as well.  He tells me that the reflux medicaiton didn't seem to help, so he is trying dietary modifications.    At the 8//22/2017 exam, I did find a small early polyp on the LEFT side.  We continued medications, but he did end up having a full blown sinusitis in October.  Since that time, and also at the visit on 12/12/17, there was persistent polyp growth on the LEFT, and then again on the RIGHT.  Treatment with medicated irrigations, but there was still recurrent polyposis, and so at the end of the 12/12 visit I added Budesonide in NeilMed irrigations.      But at this point, he was only on Gent Dex irrigations daily, but his nose is still a problem on occasion with thick and occasionally bloody drainage.  But at the April 2021 visit, he also had a new issue of a submental neck mass.  CT was done and showed what was most consistent with reactive node, no other masses. But given the size, I did request a needle biopsy done, but as far as I can tell that was not done.    At the follow up on 1/27/22 he told me that about a month prior he started having congestion and bloody crusting, so he took an old prescription of Biaxin about 2-3 weeks ago.  He tells me that it helped, but not completely.  He has been on Gent Dex irrigations daily, as well as saline rinses with baby shampoo.    ON exam at that visit, there was hemalatha purulent crusts, LEFT more than RIGHT and I cultured.  Based on those results I changed him to a Clindamycin nasal irrigation and he is here for follow up.  I have not done a new CT sinus since early 2021.    He tells me that things are notably better with the new antibiotic rinse but not totally clear.  He is still getting some sinus headache and pressure.      Past Medical History -   Patient Active Problem List   Diagnosis      Nasal polyposis     Chronic sinusitis     Advanced directives, counseling/discussion     SCOTT (obstructive sleep apnea)-Mild (AHI 6)     Nevus of multiple sites of trunk     Hypothyroidism due to Hashimoto's thyroiditis     Dupuytren's contracture of both hands     Personal history of gastric ulcer     Nocturia associated with benign prostatic hyperplasia     Hypertension, goal below 140/90     Family hx of prostate cancer     Diabetes mellitus, type 2 (H)     Hyperlipidemia LDL goal <100       Current Medications -   Current Outpatient Medications:      ALAVERT OR, once daily (Patient not taking: No sig reported), Disp: , Rfl:      Bismuth Subsalicylate (PEPTO-BISMOL PO), Take  by mouth as needed. (Patient not taking: Reported on 1/24/2022), Disp: , Rfl:      clarithromycin (BIAXIN) 500 MG tablet, Take 500 mg by mouth 2 times daily (Patient not taking: Reported on 1/24/2022), Disp: , Rfl:      COMPOUNDED NON-CONTROLLED SUBSTANCE (CMPD RX) - PHARMACY TO MIX COMPOUNDED MEDICATION, Apply 20 mLs into each nare 2 times daily Clindamycin 300mg/liter saline, Disp: 2000 mL, Rfl: 3     COMPOUNDED NON-CONTROLLED SUBSTANCE (CMPD RX) - PHARMACY TO MIX COMPOUNDED MEDICATION, Apply 20 mLs into each nare 2 times daily Cipro/Dexamethasone 1gram/120mg/liter saline, Disp: 2000 mL, Rfl: 11     COMPOUNDED NON-CONTROLLED SUBSTANCE (CMPD RX) - PHARMACY TO MIX COMPOUNDED MEDICATION, Apply 20 mLs into each nare 2 times daily Itraconazole 0.01% solution, Disp: 2000 mL, Rfl: 6     levofloxacin (LEVAQUIN) 500 MG tablet, Take 1 tablet (500 mg) by mouth daily, Disp: 10 tablet, Rfl: 1     levothyroxine (SYNTHROID/LEVOTHROID) 100 MCG tablet, TAKE 1 TABLET(100 MCG) BY MOUTH DAILY, Disp: 90 tablet, Rfl: 3     metFORMIN (GLUCOPHAGE-XR) 500 MG 24 hr tablet, Take 1 tablet (500 mg) by mouth daily (with dinner), Disp: 90 tablet, Rfl: 3     OCUVITE OR, once daily, Disp: , Rfl:      omeprazole (PRILOSEC) 40 MG DR capsule, Take 1 capsule (40 mg) by mouth  daily (Patient not taking: Reported on 1/24/2022), Disp: 90 capsule, Rfl: 3     pravastatin (PRAVACHOL) 40 MG tablet, Take 1 tablet (40 mg) by mouth daily, Disp: 90 tablet, Rfl: 3     terazosin (HYTRIN) 5 MG capsule, Take 1 capsule (5 mg) by mouth At Bedtime, Disp: 90 capsule, Rfl: 1     TYLENOL 325 MG PO TABS, 4 time per week, Disp: , Rfl:     Allergies -   Allergies   Allergen Reactions     Cefuroxime      Other reaction(s): Stomach Upset     Ceftin GI Disturbance     Oxycodone-Acetaminophen Rash       Social History -   Social History     Socioeconomic History     Marital status:      Spouse name: Not on file     Number of children: Not on file     Years of education: Not on file     Highest education level: Not on file   Occupational History     Not on file   Social Needs     Financial resource strain: Not on file     Food insecurity     Worry: Not on file     Inability: Not on file     Transportation needs     Medical: Not on file     Non-medical: Not on file   Tobacco Use     Smoking status: Never Smoker     Smokeless tobacco: Never Used   Substance and Sexual Activity     Alcohol use: Yes     Comment: 5 per week     Drug use: No     Sexual activity: Yes     Partners: Female     Birth control/protection: None   Lifestyle     Physical activity     Days per week: Not on file     Minutes per session: Not on file     Stress: Not on file   Relationships     Social connections     Talks on phone: Not on file     Gets together: Not on file     Attends Faith service: Not on file     Active member of club or organization: Not on file     Attends meetings of clubs or organizations: Not on file     Relationship status: Not on file     Intimate partner violence     Fear of current or ex partner: Not on file     Emotionally abused: Not on file     Physically abused: Not on file     Forced sexual activity: Not on file   Other Topics Concern     Parent/sibling w/ CABG, MI or angioplasty before 65F 55M? Not Asked    Social History Narrative     Not on file       Family History -   Family History   Problem Relation Age of Onset     Diabetes Mother      Skin Cancer Father      Prostate Cancer Brother      Melanoma No family hx of        Review of Systems - As per HPI and PMHx, otherwise 10+ system review of the head and neck, and general constitution is negative.    Physical Exam  /73 (BP Location: Right arm, Patient Position: Sitting, Cuff Size: Adult Large)   Pulse 92   Wt 70.8 kg (156 lb)   SpO2 98%   BMI 22.71 kg/m      General - The patient is well nourished and well developed, and appears to have good nutritional status.  Alert and oriented to person and place, answers questions and cooperates with examination appropriately.   Head and Face - Normocephalic and atraumatic, with no gross asymmetry noted of the contour of the facial features.  The facial nerve is intact, with strong symmetric movements.  Voice and Breathing - The patient was breathing comfortably without the use of accessory muscles. There was no wheezing, stridor, or stertor.  The patients voice was clear and strong, and had appropriate pitch and quality.  Ears - The tympanic membranes are normal in appearance, bony landmarks are intact.  No retraction, perforation, or masses.  No fluid or purulence was seen in the external canal or the middle ear. No evidence of infection of the middle ear or external canal, cerumen was normal in appearance.  Eyes - Extraocular movements intact, and the pupils were reactive to light.  Sclera were not icteric or injected, conjunctiva were pink and moist.      PROCEDURE  To evaluate the nose in the postoperative state I performed rigid nasal endoscopy.  I first sprayed with lidocaine and neosynephrine.  I began with the LEFT side using a 2.7mm 30 degree rigid nasal endoscope, and color photographs were taken for the medical record.    The middle meatus was open, and I was able to pass the scope through.  The LEFT  maxillary antrostomy is open and the previously noted purulence and crusts are resolved.  Going further back, the ethmoid roof is nicely re-mucosalized, and there is no abnormal secretions or polypoid degeneration.    The right side was then examined.  The middle meatus was open and I visualized the RIGHT antrostomy was open. The middle meatus is clear and open now. Going further back the ethmoid system looks good.  The mucosa is healthy, and there were polyps or polypoid degenerations.        A/P - Robert Carolina is a 65 year old male  (J32.4) Chronic pansinusitis  (primary encounter diagnosis)  (J33.9) Nasal polyposis    Things are dramatically better on endoscopy.      I would recommend using the Clinda rinse (instead of the previous Gent Dex) on only a daily basis, and follow up in 3 months.

## 2022-03-28 ENCOUNTER — OFFICE VISIT (OUTPATIENT)
Dept: OTOLARYNGOLOGY | Facility: CLINIC | Age: 67
End: 2022-03-28
Payer: COMMERCIAL

## 2022-03-28 VITALS
BODY MASS INDEX: 22.71 KG/M2 | HEART RATE: 92 BPM | DIASTOLIC BLOOD PRESSURE: 73 MMHG | WEIGHT: 156 LBS | SYSTOLIC BLOOD PRESSURE: 123 MMHG | OXYGEN SATURATION: 98 %

## 2022-03-28 DIAGNOSIS — J32.4 CHRONIC PANSINUSITIS: Primary | ICD-10-CM

## 2022-03-28 PROCEDURE — 31231 NASAL ENDOSCOPY DX: CPT | Performed by: OTOLARYNGOLOGY

## 2022-03-28 PROCEDURE — 99213 OFFICE O/P EST LOW 20 MIN: CPT | Mod: 25 | Performed by: OTOLARYNGOLOGY

## 2022-03-28 NOTE — LETTER
3/28/2022         RE: Robert Carolina  [address] [place] [address]        Dear Colleague,    Thank you for referring your patient, Robert Carolina, to the [facility]. Please see a copy of my visit note below.    History of Present Illness - Robert Carolina is a very pleasant 66 year old male last seen on 1/24/2022    He is status post functinoal endoscopic sinus surgery for polyposis on 7/21/2011.    To review the year 2013, on exam at the 6/10/2013 visit, things looked good, with some residual disease on the LEFT.  I had him continue the rinses and at the visit on 9/16/13 visit there was still some polypoid disease on the LEFT side.    At the 12/16/13 visit things looked the best I had seen in a year, and I asked him to continue the same irrigation regimen, and also discussed adding surfactant.  He did not try the surfactant at that point, and tells me that unfortunately things had taken a slight turn for the worse at the beginning of 2014.  He could tell because he feels more congested and has a bit of a sore throat from increased post nasal drainage.  And at the 3/17/2014 visit there was clearly endoscopic evidence of purulent disease on the RIGHT side.  I added itraconazole to his Gent and Dex regimen, and also treated with 14 days of bactrim at the 3/17/14 visit.    At the 12/15/14 visit he told me that the only change he had made was adding a drop of baby shampoo to his sinus saline irrigations, and he is happy to report that he thinks it is helping.  His nose is still feeling great, much more open and clear, and there has been no facial pressure.    At the 6/23/2014 visit endoscopy showed his nose looked the best it had in years.    At 12/15/14 visit and the 4/13/15 visit, no changes, still using the rinses with baby shampoo in a saline, as well as medicated rinses 4x/Week.  Unfortunately at the 10/12/15 visit, there was a lot of purulent drainage seen on the LEFT.   SO we doubled the use of the compound irrigations and that got things under control.  So overall in 2015 and 2016, he has maintained himself on really nothing more than saline NeilMed irrigations with a bit of baby shampoo.    At the 8/22/17 visit, he also had issues with a chronic tickling dry cough.  It seemed to be laryngopharyngeal reflux< so I started reflux medication and he is also here to follow up on that issue as well.  He tells me that the reflux medicaiton didn't seem to help, so he is trying dietary modifications.    At the 8//22/2017 exam, I did find a small early polyp on the LEFT side.  We continued medications, but he did end up having a full blown sinusitis in October.  Since that time, and also at the visit on 12/12/17, there was persistent polyp growth on the LEFT, and then again on the RIGHT.  Treatment with medicated irrigations, but there was still recurrent polyposis, and so at the end of the 12/12 visit I added Budesonide in NeilMed irrigations.      But at this point, he was only on Gent Dex irrigations daily, but his nose is still a problem on occasion with thick and occasionally bloody drainage.  But at the April 2021 visit, he also had a new issue of a submental neck mass.  CT was done and showed what was most consistent with reactive node, no other masses. But given the size, I did request a needle biopsy done, but as far as I can tell that was not done.    At the follow up on 1/27/22 he told me that about a month prior he started having congestion and bloody crusting, so he took an old prescription of Biaxin about 2-3 weeks ago.  He tells me that it helped, but not completely.  He has been on Gent Dex irrigations daily, as well as saline rinses with baby shampoo.    ON exam at that visit, there was hemalatha purulent crusts, LEFT more than RIGHT and I cultured.  Based on those results I changed him to a Clindamycin nasal irrigation and he is here for follow up.  I have not done a new CT  sinus since early 2021.    He tells me that things are notably better with the new antibiotic rinse but not totally clear.  He is still getting some sinus headache and pressure.      Past Medical History -   Patient Active Problem List   Diagnosis     Nasal polyposis     Chronic sinusitis     Advanced directives, counseling/discussion     SCOTT (obstructive sleep apnea)-Mild (AHI 6)     Nevus of multiple sites of trunk     Hypothyroidism due to Hashimoto's thyroiditis     Dupuytren's contracture of both hands     Personal history of gastric ulcer     Nocturia associated with benign prostatic hyperplasia     Hypertension, goal below 140/90     Family hx of prostate cancer     Diabetes mellitus, type 2 (H)     Hyperlipidemia LDL goal <100       Current Medications -   Current Outpatient Medications:      ALAVERT OR, once daily (Patient not taking: No sig reported), Disp: , Rfl:      Bismuth Subsalicylate (PEPTO-BISMOL PO), Take  by mouth as needed. (Patient not taking: Reported on 1/24/2022), Disp: , Rfl:      clarithromycin (BIAXIN) 500 MG tablet, Take 500 mg by mouth 2 times daily (Patient not taking: Reported on 1/24/2022), Disp: , Rfl:      COMPOUNDED NON-CONTROLLED SUBSTANCE (CMPD RX) - PHARMACY TO MIX COMPOUNDED MEDICATION, Apply 20 mLs into each nare 2 times daily Clindamycin 300mg/liter saline, Disp: 2000 mL, Rfl: 3     COMPOUNDED NON-CONTROLLED SUBSTANCE (CMPD RX) - PHARMACY TO MIX COMPOUNDED MEDICATION, Apply 20 mLs into each nare 2 times daily Cipro/Dexamethasone 1gram/120mg/liter saline, Disp: 2000 mL, Rfl: 11     COMPOUNDED NON-CONTROLLED SUBSTANCE (CMPD RX) - PHARMACY TO MIX COMPOUNDED MEDICATION, Apply 20 mLs into each nare 2 times daily Itraconazole 0.01% solution, Disp: 2000 mL, Rfl: 6     levofloxacin (LEVAQUIN) 500 MG tablet, Take 1 tablet (500 mg) by mouth daily, Disp: 10 tablet, Rfl: 1     levothyroxine (SYNTHROID/LEVOTHROID) 100 MCG tablet, TAKE 1 TABLET(100 MCG) BY MOUTH DAILY, Disp: 90 tablet,  Rfl: 3     metFORMIN (GLUCOPHAGE-XR) 500 MG 24 hr tablet, Take 1 tablet (500 mg) by mouth daily (with dinner), Disp: 90 tablet, Rfl: 3     OCUVITE OR, once daily, Disp: , Rfl:      omeprazole (PRILOSEC) 40 MG DR capsule, Take 1 capsule (40 mg) by mouth daily (Patient not taking: Reported on 1/24/2022), Disp: 90 capsule, Rfl: 3     pravastatin (PRAVACHOL) 40 MG tablet, Take 1 tablet (40 mg) by mouth daily, Disp: 90 tablet, Rfl: 3     terazosin (HYTRIN) 5 MG capsule, Take 1 capsule (5 mg) by mouth At Bedtime, Disp: 90 capsule, Rfl: 1     TYLENOL 325 MG PO TABS, 4 time per week, Disp: , Rfl:     Allergies -   Allergies   Allergen Reactions     Cefuroxime      Other reaction(s): Stomach Upset     Ceftin GI Disturbance     Oxycodone-Acetaminophen Rash       Social History -   Social History     Socioeconomic History     Marital status:      Spouse name: Not on file     Number of children: Not on file     Years of education: Not on file     Highest education level: Not on file   Occupational History     Not on file   Social Needs     Financial resource strain: Not on file     Food insecurity     Worry: Not on file     Inability: Not on file     Transportation needs     Medical: Not on file     Non-medical: Not on file   Tobacco Use     Smoking status: Never Smoker     Smokeless tobacco: Never Used   Substance and Sexual Activity     Alcohol use: Yes     Comment: 5 per week     Drug use: No     Sexual activity: Yes     Partners: Female     Birth control/protection: None   Lifestyle     Physical activity     Days per week: Not on file     Minutes per session: Not on file     Stress: Not on file   Relationships     Social connections     Talks on phone: Not on file     Gets together: Not on file     Attends [employer] service: Not on file     Active member of club or organization: Not on file     Attends meetings of clubs or organizations: Not on file     Relationship status: Not on file     Intimate partner violence      Fear of current or ex partner: Not on file     Emotionally abused: Not on file     Physically abused: Not on file     Forced sexual activity: Not on file   Other Topics Concern     Parent/sibling w/ CABG, MI or angioplasty before 65F 55M? Not Asked   Social History Narrative     Not on file       Family History -   Family History   Problem Relation Age of Onset     Diabetes Mother      Skin Cancer Father      Prostate Cancer Brother      Melanoma No family hx of        Review of Systems - As per HPI and PMHx, otherwise 10+ system review of the head and neck, and general constitution is negative.    Physical Exam  /73 (BP Location: Right arm, Patient Position: Sitting, Cuff Size: Adult Large)   Pulse 92   Wt 70.8 kg (156 lb)   SpO2 98%   BMI 22.71 kg/m      General - The patient is well nourished and well developed, and appears to have good nutritional status.  Alert and oriented to person and place, answers questions and cooperates with examination appropriately.   Head and Face - Normocephalic and atraumatic, with no gross asymmetry noted of the contour of the facial features.  The facial nerve is intact, with strong symmetric movements.  Voice and Breathing - The patient was breathing comfortably without the use of accessory muscles. There was no wheezing, stridor, or stertor.  The patients voice was clear and strong, and had appropriate pitch and quality.  Ears - The tympanic membranes are normal in appearance, bony landmarks are intact.  No retraction, perforation, or masses.  No fluid or purulence was seen in the external canal or the middle ear. No evidence of infection of the middle ear or external canal, cerumen was normal in appearance.  Eyes - Extraocular movements intact, and the pupils were reactive to light.  Sclera were not icteric or injected, conjunctiva were pink and moist.      PROCEDURE  To evaluate the nose in the postoperative state I performed rigid nasal endoscopy.  I first  sprayed with lidocaine and neosynephrine.  I began with the LEFT side using a 2.7mm 30 degree rigid nasal endoscope, and color photographs were taken for the medical record.    The middle meatus was open, and I was able to pass the scope through.  The LEFT maxillary antrostomy is open and the previously noted purulence and crusts are resolved.  Going further back, the ethmoid roof is nicely re-mucosalized, and there is no abnormal secretions or polypoid degeneration.    The right side was then examined.  The middle meatus was open and I visualized the RIGHT antrostomy was open. The middle meatus is clear and open now. Going further back the ethmoid system looks good.  The mucosa is healthy, and there were polyps or polypoid degenerations.        A/P - Robert Carolina is a 65 year old male  (J32.4) Chronic pansinusitis  (primary encounter diagnosis)  (J33.9) Nasal polyposis    Things are dramatically better on endoscopy.      I would recommend using the Clinda rinse (instead of the previous Gent Dex) on only a daily basis, and follow up in 3 months.      Again, thank you for allowing me to participate in the care of your patient.        Sincerely,        Colton Wood MD

## 2022-03-28 NOTE — NURSING NOTE
"Chief Complaint   Patient presents with     Sinusitis     RECHECK       Vitals:    03/28/22 1133   BP: 123/73   BP Location: Right arm   Patient Position: Sitting   Cuff Size: Adult Large   Pulse: 92   SpO2: 98%   Weight: 70.8 kg (156 lb)     Wt Readings from Last 1 Encounters:   03/28/22 70.8 kg (156 lb)     Ht Readings from Last 1 Encounters:   05/06/21 1.765 m (5' 9.5\")       Eleonora Ardon [facility], 3/28/2022 11:34 AM    "

## 2022-05-08 ENCOUNTER — HEALTH MAINTENANCE LETTER (OUTPATIENT)
Age: 67
End: 2022-05-08

## 2022-05-27 ENCOUNTER — OFFICE VISIT (OUTPATIENT)
Dept: DERMATOLOGY | Facility: CLINIC | Age: 67
End: 2022-05-27
Payer: COMMERCIAL

## 2022-05-27 DIAGNOSIS — D48.5 NEOPLASM OF UNCERTAIN BEHAVIOR OF SKIN: ICD-10-CM

## 2022-05-27 DIAGNOSIS — L57.0 ACTINIC KERATOSES: ICD-10-CM

## 2022-05-27 DIAGNOSIS — Z86.018 HISTORY OF DYSPLASTIC NEVUS: Primary | ICD-10-CM

## 2022-05-27 PROCEDURE — 17000 DESTRUCT PREMALG LESION: CPT | Mod: XS | Performed by: DERMATOLOGY

## 2022-05-27 PROCEDURE — 11102 TANGNTL BX SKIN SINGLE LES: CPT | Performed by: DERMATOLOGY

## 2022-05-27 PROCEDURE — 88342 IMHCHEM/IMCYTCHM 1ST ANTB: CPT | Performed by: PATHOLOGY

## 2022-05-27 PROCEDURE — 88341 IMHCHEM/IMCYTCHM EA ADD ANTB: CPT | Performed by: PATHOLOGY

## 2022-05-27 PROCEDURE — 88305 TISSUE EXAM BY PATHOLOGIST: CPT | Performed by: PATHOLOGY

## 2022-05-27 PROCEDURE — 11103 TANGNTL BX SKIN EA SEP/ADDL: CPT | Performed by: DERMATOLOGY

## 2022-05-27 PROCEDURE — 17003 DESTRUCT PREMALG LES 2-14: CPT | Performed by: DERMATOLOGY

## 2022-05-27 NOTE — PATIENT INSTRUCTIONS
Wound Care After a Biopsy    What is a skin biopsy?  A skin biopsy allows the doctor to examine a very small piece of tissue under the microscope to determine the diagnosis and the best treatment for the skin condition. A local anesthetic (numbing medicine)  is injected with a very small needle into the skin area to be tested. A small piece of skin is taken from the area. Sometimes a suture (stitch) is used.     What are the risks of a skin biopsy?  I will experience scar, bleeding, swelling, pain, crusting and redness. I may experience incomplete removal or recurrence. Risks of this procedure are excessive bleeding, bruising, infection, nerve damage, numbness, thick (hypertrophic or keloidal) scar and non-diagnostic biopsy.    How should I care for my wound for the first 24 hours?  Keep the wound dry and covered for 24 hours  If it bleeds, hold direct pressure on the area for 15 minutes. If bleeding does not stop then go to the emergency room  Avoid strenuous exercise the first 1-2 days or as your doctor instructs you    How should I care for the wound after 24 hours?  After 24 hours, remove the bandage  You may bathe or shower as normal  If you had a scalp biopsy, you can shampoo as usual and can use shower water to clean the biopsy site daily  Clean the wound twice a day with gentle soap and water  Do not scrub, be gentle  Apply white petroleum/Vaseline after cleaning the wound with a cotton swab or a clean finger, and keep the site covered with a Bandaid /bandage. Bandages are not necessary with a scalp biopsy  If you are unable to cover the site with a Bandaid /bandage, re-apply ointment 2-3 times a day to keep the site moist. Moisture will help with healing  Avoid strenuous activity for first 1-2 days  Avoid lakes, rivers, pools, and oceans until the stitches are removed or the site is healed    How do I clean my wound?  Wash hands thoroughly with soap or use hand  before all wound care  Clean the  wound with gentle soap and water  Apply white petroleum/Vaseline  to wound after it is clean  Replace the Bandaid /bandage to keep the wound covered for the first few days or as instructed by your doctor  If you had a scalp biopsy, warm shower water to the area on a daily basis should suffice    What should I use to clean my wound?   Cotton-tipped applicators (Qtips )  White petroleum jelly (Vaseline ). Use a clean new container and use Q-tips to apply.  Bandaids   as needed  Gentle soap     How should I care for my wound long term?  Do not get your wound dirty  Keep up with wound care for one week or until the area is healed.  A small scab will form and fall off by itself when the area is completely healed. The area will be red and will become pink in color as it heals. Sun protection is very important for how your scar will turn out. Sunscreen with an SPF 30 or greater is recommended once the area is healed.  You should have some soreness but it should be mild and slowly go away over several days. Talk to your doctor about using tylenol for pain,    When should I call my doctor?  If you have increased:   Pain or swelling  Pus or drainage (clear or slightly yellow drainage is ok)  Temperature over 100F  Spreading redness or warmth around wound    When will I hear about my results?  The biopsy results can take 2 weeks to come back.  Your results will automatically release to [employer] before your provider has even reviewed them.  The clinic will call you with the results, send you a [email or web address] message, or have you schedule a follow-up clinic or phone time to discuss the results.  Contact our clinics if you do not hear from us in 2 weeks.    Who should I call with questions?  [facility]: [phone]  [facility]: [phone]  For urgent needs outside of business hours call the [facility] at [phone] and ask for the dermatology resident on  call    Cryotherapy    What is it?  Use of a very cold liquid, such as liquid nitrogen, to freeze and destroy abnormal skin cells that need to be removed    What should I expect?  Tenderness and redness  A small blister that might grow and fill with dark purple blood. There may be crusting.  More than one treatment may be needed if the lesions do not go away.    How do I care for the treated area?  Gently wash the area with your hands when bathing.  Use a thin layer of Vaseline to help with healing. You may use a Band-Aid.   The area should heal within 7-10 days and may leave behind a pink or lighter color.   Do not use an antibiotic or Neosporin ointment.   You may take acetaminophen (Tylenol) for pain.     Call your doctor if you have:  Severe pain  Signs of infection (warmth, redness, cloudy yellow drainage, and or a bad smell)  Questions or concerns    Who should I call with questions?      [facility]: [phone]      [facility]: [phone]      For urgent needs outside of business hours call the [facility] at [phone] and ask for the dermatology resident on call    Patient Education     Checking for Skin Cancer  You can find cancer early by checking your skin each month. There are 3 kinds of skin cancer. They are melanoma, basal cell carcinoma, and squamous cell carcinoma. Doing monthly skin checks is the best way to find new marks or skin changes. Follow the instructions below for checking your skin.   The ABCDEs of checking moles for melanoma   Check your moles or growths for signs of melanoma using ABCDE:   Asymmetry: the sides of the mole or growth don t match  Border: the edges are ragged, notched, or blurred  Color: the color within the mole or growth varies  Diameter: the mole or growth is larger than 6 mm (size of a pencil eraser)  Evolving: the size, shape, or color of the mole or growth is changing (evolving is not shown in  the images below)    Checking for other types of skin cancer  Basal cell carcinoma or squamous cell carcinoma have symptoms such as:     A spot or mole that looks different from all other marks on your skin  Changes in how an area feels, such as itching, tenderness, or pain  Changes in the skin's surface, such as oozing, bleeding, or scaliness  A sore that does not heal  New swelling or redness beyond the border of a mole    Who s at risk?  Anyone can get skin cancer. But you are at greater risk if you have:   Fair skin, light-colored hair, or light-colored eyes  Many moles or abnormal moles on your skin  A history of sunburns from sunlight or tanning beds  A family history of skin cancer  A history of exposure to radiation or chemicals  A weakened immune system  If you have had skin cancer in the past, you are at risk for recurring skin cancer.   How to check your skin  Do your monthly skin checkups in front of a full-length mirror. Check all parts of your body, including your:   Head (ears, face, neck, and scalp)  Torso (front, back, and sides)  Arms (tops, undersides, upper, and lower armpits)  Hands (palms, backs, and fingers, including under the nails)  Buttocks and genitals  Legs (front, back, and sides)  Feet (tops, soles, toes, including under the nails, and between toes)  If you have a lot of moles, take digital photos of them each month. Make sure to take photos both up close and from a distance. These can help you see if any moles change over time.   Most skin changes are not cancer. But if you see any changes in your skin, call your doctor right away. Only he or she can diagnose a problem. If you have skin cancer, seeing your doctor can be the first step toward getting the treatment that could save your life.   [employer] last reviewed this educational content on 4/1/2019 2000-2020 [employer]. [address] [place] [address], [place] [address]. All rights reserved. This information is not intended  as a substitute for professional medical care. Always follow your healthcare professional's instructions.       When should I call my doctor?  If you are worsening or not improving, please, contact us or seek urgent care as noted below.     Who should I call with questions (adults)?  [facility] (adult and pediatric): [phone]  [facility] (adult): [phone]  For urgent needs outside of business hours call the [facility] at [phone] and ask for the dermatology resident on call to be paged  If this is a medical emergency and you are unable to reach an ER, Call 911    Who should I call with questions (pediatric)?  [facility]- Pediatric Dermatology  Dr. Idalia Tee, Dr. Allan Spring, Dr. Lesa Malagon, RAQUEL Pantoja, Dr. Eulalia Montes, Dr. Abimbola Barnes & Dr. Ashish Brannon  Non-urgent nurse triage line; [phone]- [facility]TAK[facility] Care Coordinators   Caryl (/Complex ) [phone]    If you need a prescription refill, please contact your pharmacy. Refills are approved or denied by our Physicians during normal business hours, Monday through Fridays  Per office policy, refills will not be granted if you have not been seen within the past year (or sooner depending on your child's condition)    Scheduling Information:  Pediatric Appointment Scheduling and Call Center [phone]  Radiology Scheduling- [phone]  Sedation Unit Scheduling- [phone]  [facility] Scheduling- General [phone]; Pediatric Dermatology [phone]  Main  Services: [phone]  Azeri: [phone]  Danish: [phone]  Hmong/Malawian/Guatemalan: [phone]  Preadmission Nursing Department Fax Number: [phone] (Fax all pre-operative paperwork to this number)    For urgent matters arising during evenings, weekends, or holidays that cannot wait for normal business hours  please call [phone] and ask for the dermatology resident on call to be paged.

## 2022-05-27 NOTE — NURSING NOTE
Robert Carolina's goals for this visit include:   Chief Complaint   Patient presents with     Skin Check     FBSE. No area of concern. No hx of skin cancer. Family hx-father with unknown type       He requests these members of his care team be copied on today's visit information:     PCP: No Ref-Primary, Physician    Referring Provider:  Referred Self, MD  No address on file    There were no vitals taken for this visit.    Do you need any medication refills at today's visit? Ronit Bland on 5/27/2022 at 3:04 PM

## 2022-05-27 NOTE — NURSING NOTE
The following medication was given:     MEDICATION:  Lidocaine with epinephrine 1% 1:[id number]  ROUTE: SQ  SITE: see procedure note  DOSE: see procedure  note  LOT #: -EV  : Byron  EXPIRATION DATE: 12/1/22  NDC#: 6591-6173-29  Was there drug waste?   Multi-dose vial: Yes          Buffy Bland on 5/27/2022 at 3:49 PM

## 2022-05-27 NOTE — PROGRESS NOTES
[facility] Dermatology Note  Encounter Date: May 27, 2022  Office Visit     Dermatology Problem List:  0. NUB - right lateral abdomen, right mid back, right mid lateral back, right forearm, left dorsal foot - bx 5/27/22  1. Compound nevus with moderate dysplasia, right abdomen  -s/p biospy 8/20/2015  2. AK s/p cryo    ____________________________________________    Assessment & Plan:    # History of DN. Location unclear but we will biopsy atypical clinical lesion below . While these are benign, they are a marker for increased melanoma risk.  - Recommended yearly skin checks.    # Neoplasm of uncertain behavior on the right lateral abdomen. The differential diagnosis includes SK vs ISK vs returning DN. .   - See procedure note.    # Neoplasm of uncertain behavior on the right mid back. The differential diagnosis includes DN vs other. .   - See procedure note.    # Neoplasm of uncertain behavior on the right mid lateral back. The differential diagnosis includes DN vs other. .   - See procedure note.      # Neoplasm of uncertain behavior on the right forearm. The differential diagnosis includes DN vs other.   - See procedure note.     # Neoplasm of uncertain behavior on the left dorsal foot. The differential diagnosis includes DF vs other.   - See procedure note.     # Actinic keratosis - scalp x 3   - See procedure note.        Procedures Performed:   - Shave biopsy procedure note, location(s): right lateral abdomen, right mid back, right mid lateral back, right forearm, left dorsal foot. After discussion of benefits and risks including but not limited to bleeding, infection, scar, incomplete removal, recurrence, and non-diagnostic biopsy, written consent and photographs were obtained. The area was cleaned with isopropyl alcohol. 0.5mL of 1% lidocaine with epinephrine was injected to obtain adequate anesthesia of lesion(s). Shave biopsy at site(s) performed. Hemostasis was achieved with aluminium  chloride. Petrolatum ointment and a sterile dressing were applied. The patient tolerated the procedure and no complications were noted. The patient was provided with verbal and written post care instructions.     - Cryotherapy procedure note, location(s): scalp. After verbal consent and discussion of risks and benefits including, but not limited to, dyspigmentation/scar, blister, and pain, 3 AKs was(were) treated with 1-2 mm freeze border for 1-2 cycles with liquid nitrogen. Post cryotherapy instructions were provided.    Follow-up: pending path results, 1 year(s) in-person, or earlier for new or changing lesions    Staff and Scribe:     Scribe Disclosure:   I, Ray Kulkarni, am serving as a scribe to document services personally performed by this physician, Dr. Alisha Blackwood, based on data collection and the provider's statements to me.     Provider Disclosure:   The documentation recorded by the scribe accurately reflects the services I personally performed and the decisions made by me.    Alisha Blackwood MD    Department of Dermatology  [facility]: Phone: [phone], Fax:[phone]  [facility]: Phone: [phone], Fax: [phone]      ____________________________________________    CC: Skin Check (FBSE. No area of concern. No hx of skin cancer. Family hx-father with unknown type)    HPI:  Mr. Robert Carolina is a(n) 66 year old male who presents today as a new patient for a skin check.    Self referred.    Today, he has no areas of concern. His health has been stable since he was last seen. His father has a history of skin cancer, unknown type.    Patient is otherwise feeling well, without additional skin concerns.    Labs Reviewed:  N/A    Physical Exam:  Vitals: There were no vitals taken for this visit.  SKIN: Total skin excluding the undergarment areas was performed. The exam  included the head/face, neck, both arms, chest, back, abdomen, both legs, digits and/or nails. Declines genital exam. Buffy Hung and grant present  - Well healed scar on the right abdomen  - Stuck on waxy papule with erythema on the right lateral abdomen with partial erythema  - Right mid back: 5 mm pigmented macule with erythema  - Right mid lateral back: 3 mm irregularly shaped pigmented macule  - Right forearm: 5 mm irregularly pigmented macule  - There are erythematous macules with overyling adherent scale on the scalp x 3.    - Left Dorsal Foot: papule that dimples  - No other lesions of concern on areas examined.     Medications:  Current Outpatient Medications   Medication     COMPOUNDED NON-CONTROLLED SUBSTANCE (CMPD RX) - PHARMACY TO MIX COMPOUNDED MEDICATION     levothyroxine (SYNTHROID/LEVOTHROID) 100 MCG tablet     metFORMIN (GLUCOPHAGE-XR) 500 MG 24 hr tablet     pravastatin (PRAVACHOL) 40 MG tablet     terazosin (HYTRIN) 5 MG capsule     TYLENOL 325 MG PO TABS     ALAVERT OR     Bismuth Subsalicylate (PEPTO-BISMOL PO)     clarithromycin (BIAXIN) 500 MG tablet     COMPOUNDED NON-CONTROLLED SUBSTANCE (CMPD RX) - PHARMACY TO MIX COMPOUNDED MEDICATION     COMPOUNDED NON-CONTROLLED SUBSTANCE (CMPD RX) - PHARMACY TO MIX COMPOUNDED MEDICATION     levofloxacin (LEVAQUIN) 500 MG tablet     OCUVITE OR     omeprazole (PRILOSEC) 40 MG DR capsule     No current facility-administered medications for this visit.      Past Medical History:   Patient Active Problem List   Diagnosis     Nasal polyposis     Chronic sinusitis     Advanced directives, counseling/discussion     SCOTT (obstructive sleep apnea)-Mild (AHI 6)     Nevus of multiple sites of trunk     Hypothyroidism due to Hashimoto's thyroiditis     Dupuytren's contracture of both hands     Personal history of gastric ulcer     Nocturia associated with benign prostatic hyperplasia     Hypertension, goal below 140/90     Family hx of prostate cancer     Diabetes  mellitus, type 2 (H)     Hyperlipidemia LDL goal <100     Past Medical History:   Diagnosis Date     Bleeding ulcer 2008    Duodenal      Chronic sinusitis      Seasonal allergies         CC Referred Self, MD  No address on file on close of this encounter.

## 2022-05-27 NOTE — LETTER
5/27/2022         RE: Robert Carolina  [address] [place] [address]        Dear Colleague,    Thank you for referring your patient, Robert Carolina, to the [facility]. Please see a copy of my visit note below.    [facility] Dermatology Note  Encounter Date: May 27, 2022  Office Visit     Dermatology Problem List:  0. NUB - right lateral abdomen, right mid back, right mid lateral back, right forearm, left dorsal foot - bx 5/27/22  1. Compound nevus with moderate dysplasia, right abdomen  -s/p biospy 8/20/2015  2. AK s/p cryo    ____________________________________________    Assessment & Plan:    # History of DN. Location unclear but we will biopsy atypical clinical lesion below . While these are benign, they are a marker for increased melanoma risk.  - Recommended yearly skin checks.    # Neoplasm of uncertain behavior on the right lateral abdomen. The differential diagnosis includes SK vs ISK vs returning DN. .   - See procedure note.    # Neoplasm of uncertain behavior on the right mid back. The differential diagnosis includes DN vs other. .   - See procedure note.    # Neoplasm of uncertain behavior on the right mid lateral back. The differential diagnosis includes DN vs other. .   - See procedure note.      # Neoplasm of uncertain behavior on the right forearm. The differential diagnosis includes DN vs other.   - See procedure note.     # Neoplasm of uncertain behavior on the left dorsal foot. The differential diagnosis includes DF vs other.   - See procedure note.     # Actinic keratosis - scalp x 3   - See procedure note.        Procedures Performed:   - Shave biopsy procedure note, location(s): right lateral abdomen, right mid back, right mid lateral back, right forearm, left dorsal foot. After discussion of benefits and risks including but not limited to bleeding, infection, scar, incomplete removal, recurrence, and non-diagnostic biopsy,  written consent and photographs were obtained. The area was cleaned with isopropyl alcohol. 0.5mL of 1% lidocaine with epinephrine was injected to obtain adequate anesthesia of lesion(s). Shave biopsy at site(s) performed. Hemostasis was achieved with aluminium chloride. Petrolatum ointment and a sterile dressing were applied. The patient tolerated the procedure and no complications were noted. The patient was provided with verbal and written post care instructions.     - Cryotherapy procedure note, location(s): scalp. After verbal consent and discussion of risks and benefits including, but not limited to, dyspigmentation/scar, blister, and pain, 3 AKs was(were) treated with 1-2 mm freeze border for 1-2 cycles with liquid nitrogen. Post cryotherapy instructions were provided.    Follow-up: pending path results, 1 year(s) in-person, or earlier for new or changing lesions    Staff and Scribe:     Scribe Disclosure:   I, Ray Kulkarni, am serving as a scribe to document services personally performed by this physician, Dr. Alisha Blackwood, based on data collection and the provider's statements to me.     Provider Disclosure:   The documentation recorded by the scribe accurately reflects the services I personally performed and the decisions made by me.    Alisha Blackwood MD    Department of Dermatology  [facility]: Phone: [phone], Fax:[phone]  [facility]: Phone: [phone], Fax: [phone]      ____________________________________________    CC: Skin Check (FBSE. No area of concern. No hx of skin cancer. Family hx-father with unknown type)    HPI:  Mr. Robert Carolina is a(n) 66 year old male who presents today as a new patient for a skin check.    Self referred.    Today, he has no areas of concern. His health has been stable since he was last seen. His father has a history of skin  cancer, unknown type.    Patient is otherwise feeling well, without additional skin concerns.    Labs Reviewed:  N/A    Physical Exam:  Vitals: There were no vitals taken for this visit.  SKIN: Total skin excluding the undergarment areas was performed. The exam included the head/face, neck, both arms, chest, back, abdomen, both legs, digits and/or nails. Declines genital exam. Buffy Abhilash and scribe present  - Well healed scar on the right abdomen  - Stuck on waxy papule with erythema on the right lateral abdomen with partial erythema  - Right mid back: 5 mm pigmented macule with erythema  - Right mid lateral back: 3 mm irregularly shaped pigmented macule  - Right forearm: 5 mm irregularly pigmented macule  - There are erythematous macules with overyling adherent scale on the scalp x 3.    - Left Dorsal Foot: papule that dimples  - No other lesions of concern on areas examined.     Medications:  Current Outpatient Medications   Medication     COMPOUNDED NON-CONTROLLED SUBSTANCE (CMPD RX) - PHARMACY TO MIX COMPOUNDED MEDICATION     levothyroxine (SYNTHROID/LEVOTHROID) 100 MCG tablet     metFORMIN (GLUCOPHAGE-XR) 500 MG 24 hr tablet     pravastatin (PRAVACHOL) 40 MG tablet     terazosin (HYTRIN) 5 MG capsule     TYLENOL 325 MG PO TABS     ALAVERT OR     Bismuth Subsalicylate (PEPTO-BISMOL PO)     clarithromycin (BIAXIN) 500 MG tablet     COMPOUNDED NON-CONTROLLED SUBSTANCE (CMPD RX) - PHARMACY TO MIX COMPOUNDED MEDICATION     COMPOUNDED NON-CONTROLLED SUBSTANCE (CMPD RX) - PHARMACY TO MIX COMPOUNDED MEDICATION     levofloxacin (LEVAQUIN) 500 MG tablet     OCUVITE OR     omeprazole (PRILOSEC) 40 MG DR capsule     No current facility-administered medications for this visit.      Past Medical History:   Patient Active Problem List   Diagnosis     Nasal polyposis     Chronic sinusitis     Advanced directives, counseling/discussion     SCOTT (obstructive sleep apnea)-Mild (AHI 6)     Nevus of multiple sites of trunk      Hypothyroidism due to Hashimoto's thyroiditis     Dupuytren's contracture of both hands     Personal history of gastric ulcer     Nocturia associated with benign prostatic hyperplasia     Hypertension, goal below 140/90     Family hx of prostate cancer     Diabetes mellitus, type 2 (H)     Hyperlipidemia LDL goal <100     Past Medical History:   Diagnosis Date     Bleeding ulcer 2008    Duodenal      Chronic sinusitis      Seasonal allergies         CC Referred Self, MD  No address on file on close of this encounter.      Again, thank you for allowing me to participate in the care of your patient.        Sincerely,        Alisha Blackwood MD

## 2022-06-01 LAB
PATH REPORT.COMMENTS IMP SPEC: NORMAL
PATH REPORT.FINAL DX SPEC: NORMAL
PATH REPORT.GROSS SPEC: NORMAL
PATH REPORT.MICROSCOPIC SPEC OTHER STN: NORMAL
PATH REPORT.RELEVANT HX SPEC: NORMAL

## 2022-06-14 ENCOUNTER — TELEPHONE (OUTPATIENT)
Dept: DERMATOLOGY | Facility: CLINIC | Age: 67
End: 2022-06-14
Payer: COMMERCIAL

## 2022-06-14 NOTE — TELEPHONE ENCOUNTER
Ronna Greene RN   6/14/2022  3:29 PM CDT Back to Top        Biopsy results reviewed in detail with pt. Pt voiced understanding and all pt's questions were answered. Pt scheduled with  and RN noted one year follow up already scheduled..Ronna Jeffery RN   6/13/2022  2:43 PM CDT         I left a message for patient to call [facility].  PANFILO Cortes MD   6/13/2022 12:31 PM CDT         Please call patient and schedule surgery with Dr. Ruff. Offer phone visit from me if he has any questions.         A(1). Skin, right lateral abdomen, shave:  -  Compound dysplastic nevus with moderate atypia and inflammation - The pathologists reviewed this case. This atypical mole is recommended excision for safety. It was inflamed     B(2). Skin, right mid back, shave:  -  Intradermal melanocytic nevus -normal, no treatment needed     C(3). Skin, right mid lateral back, shave:  -  Compound dysplastic nevus with moderate atypia - I recommend we recheck this site in 1 year and remove if this lesions returns.      D(4). 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 Skin, left dorsal foot, shave:  - Features consistent with dermatofibroma - recheck in 1 year but appears consistent with scar like bump         Patient Communication        Excision previsit information                                                    Excision of: squamous cell carcinoma in situ and compound dysplastic Nevus with moderate Atypia  Site(s): SCCIS right abdomen/CDN right lateral abdomen   -if site is the groin or below knee, send to RN for initiation of antibiotic prophylaxis protocol.    Medication & Allergy Information                                                    CURRENT MEDICATIONS:   Current Outpatient Medications   Medication Sig  Dispense Refill     ALAVERT OR once daily (Patient not taking: No sig reported)       Bismuth Subsalicylate (PEPTO-BISMOL PO) Take  by mouth as needed. (Patient not taking: Reported on 1/24/2022)       clarithromycin (BIAXIN) 500 MG tablet Take 500 mg by mouth 2 times daily (Patient not taking: Reported on 1/24/2022)       COMPOUNDED NON-CONTROLLED SUBSTANCE (CMPD RX) - PHARMACY TO MIX COMPOUNDED MEDICATION Apply 20 mLs into each nare 2 times daily Clindamycin 300mg/liter saline 2000 mL 3     COMPOUNDED NON-CONTROLLED SUBSTANCE (CMPD RX) - PHARMACY TO MIX COMPOUNDED MEDICATION Apply 20 mLs into each nare 2 times daily Cipro/Dexamethasone 1gram/120mg/liter saline 2000 mL 11     COMPOUNDED NON-CONTROLLED SUBSTANCE (CMPD RX) - PHARMACY TO MIX COMPOUNDED MEDICATION Apply 20 mLs into each nare 2 times daily Itraconazole 0.01% solution 2000 mL 6     levofloxacin (LEVAQUIN) 500 MG tablet Take 1 tablet (500 mg) by mouth daily 10 tablet 1     levothyroxine (SYNTHROID/LEVOTHROID) 100 MCG tablet TAKE 1 TABLET(100 MCG) BY MOUTH DAILY 90 tablet 3     metFORMIN (GLUCOPHAGE-XR) 500 MG 24 hr tablet Take 1 tablet (500 mg) by mouth daily (with dinner) 90 tablet 3     OCUVITE OR once daily       omeprazole (PRILOSEC) 40 MG DR capsule Take 1 capsule (40 mg) by mouth daily 90 capsule 3     pravastatin (PRAVACHOL) 40 MG tablet Take 1 tablet (40 mg) by mouth daily 90 tablet 3     terazosin (HYTRIN) 5 MG capsule Take 1 capsule (5 mg) by mouth At Bedtime 90 capsule 1     TYLENOL 325 MG PO TABS 4 time per week         Do you take the following medications:  Coumadin, Eliquis, Pradaxa, Xarelto:   NO   -If on Coumadin, INR should be checked within 7 days of surgery.  Range should be 3.5 or less or within therapeutic range.    Do you have an ALLERGIC REACTION to any medications:  YES   Ciprofloxacin, Carbaphen, Ceftin, Cefuroxime, No clinical screening - see comments, and Oxycodone-acetaminophen    Past Medical History                                                     Do you currently or have you previously had any of the following conditions:       HIV/AIDS:  NO    Hepatitis:  NO    Suppressed immune system/Organ transplant:  NO      Autoimmune disorder (eg RA, lupus):  NO    Prolonged bleeding or bleeding disorder:  NO    History of cold sores or shingles at surgical site:  NO  If yes, notify provider for possible need for Valtrex.    Pacemaker or Defibrillator:  NO.      History of artificial or heart valve replacement:  NO    Endocarditis/Rheumatic fever: NO    Have you been told you should take antibiotic prior to dental work or surgery:  NO    Joint replacement within past 2 years: NO    Previous prosthetic joint infection: NO    Diabetes: YES not insulin dependent     Pregnant or Breastfeeding: N/A    Keloids or abnormal scars: NO    Mobility device (wheelchair, transfer difficulty): NO    Tobacco use:  NO.      If any positives, send to RN for further review  Ronna Greene RN

## 2022-06-20 DIAGNOSIS — J32.4 CHRONIC PANSINUSITIS: ICD-10-CM

## 2022-06-21 NOTE — TELEPHONE ENCOUNTER
Pending Prescriptions:                       Disp   Refills    COMPOUNDED NON-CONTROLLED SUBSTANCE (CMPD*2000 mL3            Sig: Apply 20 mLs into each nare 2 times daily           Clindamycin 300mg/liter saline    Routing refill request to provider for review/approval because:  Drug not [place] [facility] [place].  LOV 3/28/22.    Leslie Miranda RN

## 2022-07-03 ENCOUNTER — HEALTH MAINTENANCE LETTER (OUTPATIENT)
Age: 67
End: 2022-07-03

## 2022-07-11 ENCOUNTER — OFFICE VISIT (OUTPATIENT)
Dept: DERMATOLOGY | Facility: CLINIC | Age: 67
End: 2022-07-11
Payer: COMMERCIAL

## 2022-07-11 VITALS — HEART RATE: 71 BPM | DIASTOLIC BLOOD PRESSURE: 77 MMHG | SYSTOLIC BLOOD PRESSURE: 146 MMHG

## 2022-07-11 DIAGNOSIS — D23.9 DYSPLASTIC NEVUS: ICD-10-CM

## 2022-07-11 DIAGNOSIS — D04.61 SQUAMOUS CELL CARCINOMA IN SITU (SCCIS) OF SKIN OF RIGHT FOREARM: ICD-10-CM

## 2022-07-11 PROCEDURE — 88305 TISSUE EXAM BY PATHOLOGIST: CPT | Performed by: DERMATOLOGY

## 2022-07-11 PROCEDURE — 11402 EXC TR-EXT B9+MARG 1.1-2 CM: CPT | Performed by: DERMATOLOGY

## 2022-07-11 PROCEDURE — 11602 EXC TR-EXT MAL+MARG 1.1-2 CM: CPT | Performed by: DERMATOLOGY

## 2022-07-11 PROCEDURE — 12034 INTMD RPR S/TR/EXT 7.6-12.5: CPT | Performed by: DERMATOLOGY

## 2022-07-11 NOTE — NURSING NOTE
The following medication was given:     MEDICATION:  Lidocaine with epinephrine 1% [id number]  ROUTE: SQ  SITE: see procedure note  DOSE: 24cc  LOT #: -[id number]  : Hospira  EXPIRATION DATE: 1/1/23  NDC#: 2557-5555-74  Was there drug waste? no  Multi-dose vial: Yes    Mastisol, steri-strips, Tegaderm and Telfa pressure bandage applied to excision site on right forearm and right lateral abdomen.  Wound care instructions reviewed with patient and AVS provided.  Patient verbalized understanding.  Patient will follow up for wound check in 2 weeks.  No further questions or concerns at this time.    Paula Jeffery RN

## 2022-07-11 NOTE — PATIENT INSTRUCTIONS
Excision Wound Care Instructions with Steri Strips    Possible complications of any surgical procedure are bleeding, infection, scarring, alteration in skin color and sensation, muscle weakness in the area, wound dehiscence or seperation, or recurrence of the lesion or disease. On occasion, after healing, a secondary procedure or revision may be recommended in order to obtain the best cosmetic or functional result.     After your surgery, a pressure bandage will be placed over the area that has sutures. This will help prevent bleeding. Please, follow these instructions as they will help you to prevent complications as your wound heals.    For the First 48 hours After Surgery:    Leave the pressure bandage on and keep it dry. If it should come loose, you may retape it, but do not take it off.    Relax and take it easy. Do not do any vigorous exercise, heavy lifting, or bending forward. This could cause the wound to bleed.    Post-operative pain is usually mild. You may alternate between 1000 mg of Tylenol (acetaminophen) and 400 mg of Ibuprofen every 4 hours.  Do not take more than 4,000 mg of acetaminophen and more than 3200 mg of Ibuprofen in a 24 hr period.  Avoid alcohol and vitamin E as these may increase your tendency to bleed.    You may put an ice pack around the bandaged area for 20 minutes every 2-3 hours. This may help reduce swelling, bruising, and pain. Make sure the ice pack is waterproof so that the pressure bandage does not get wet.     You may see a small amount of drainage or blood on your pressure bandage.  This is normal.  However, if drainage or bleeding continues or saturates the bandage, you will need to apply firm pressure over the bandage with a washcloth for 15 minutes. If bleeding continues after applying pressure for 15 minutes then go to the nearest emergency room.      48 Hours After Surgery:    Remove outer white bandage down to clear plastic film (Tegaderm).    Leave the clear plastic  film (Tegaderm) on for up to 2 weeks, as long as it is intact.  If it falls off prior to 2 weeks follow daily wound care below.  If it stays intact for the full 2 weeks, then remove and treat as normal, healthy skin.      Daily Wound Care (if Tegaderm and Steri-Strips fall off prior to 2 weeks):    Wash wound with a mild soap and water.  Use caution when washing the wound, be gentle and do not let the forceful shower stream hit the wound directly. DO NOT WASH WITH HYDROGEN PEROXIDE AS THIS MIGHT CAUSE THE STITCHES TO DISSOLVE FASTER THAN WHAT WE WANT.    Pat dry.    Apply Vaseline (from a new container or tube) over the suture line with a Q-tip until it is completely healed. It is very important to keep the wound continuously moist, as wounds heal best in a moist environment.    Keep the site covered until it's healed.  You can cover it with a Telfa (non-stick) dressing and tape or a band-aid until healed with normal, healthy skin.      Call Us If:    You have pain that is not controlled with Tylenol/Ibuprofen.    You have signs or symptoms of an infection, such as: fever over 100 degrees F, redness, warmth, or foul-smelling or yellow/creamy drainage from the wound.      Who should I call with questions?  [facility]: [phone]  [facility]: [phone]  For urgent needs outside of busi4.5ness hours call the [facility] at [phone] and ask for the dermatology resident on call

## 2022-07-11 NOTE — PROCEDURES
DERMATOLOGY EXCISION PROCEDURE NOTE    Dermatology Problem List:  NMSC  1. SCCIS, R forearm, s/p excision 7/11/22    2. - Compound dysplastic nevus with moderate atypia, R mid lateral back 5/27/22; Clinical observation.   - Compound dysplastic nevus with moderate atypia and inflammation, R lateral abdomen, s/p excision and linear repair 7/11/22   - Compound nevus with moderate dysplasia, right abdomen s/p biospy 8/20/2015    3. AK s/p cryo  ____________________________________________     Case 1  NAME OF PROCEDURE: Excision intermediate layered linear closure  Staff surgeon: Haroldo Ruff DO  Student/Resident: Deborah Wheeler MS4  Scrub Nurse: Paula Jeffery RN    PRE-OPERATIVE DIAGNOSIS:  SCCIS  POST-OPERATIVE DIAGNOSIS: Same   LOCATION: right forearm  FINAL EXCISION SIZE(DEFECT SIZE): 1.7x1.5 cm  MARGIN: 4mm  FINAL REPAIR LENGTH: 4.5 cm   ANESTHESIA: 12cc 1% lidocaine with 1:100,000 epinephrine    INDICATIONS: This patient presented with a 0.9x0.7cm Squamous cell carcinoma. Excision was indicated. We discussed the principles of treatment and most likely complications including scarring, bleeding, infection, incomplete excision, wound dehiscence, pain, nerve damage, and recurrence. Informed consent was obtained and the patient underwent the procedure as follows:    PROCEDURE: The patient was taken to the operative suite. Time-out was performed.  The treatment area was anesthetized with 1% lidocaine with epinephrine. The area was prepped with Chlorhexidine and rinsed with sterile saline and draped with sterile towels. The lesion was delineated and excised down to subcutaneous fat in a elliptical manner. Hemostasis was obtained by electrocoagulation.     REPAIR: An intermediate layered linear closure was selected as the procedure which would maximally preserve both function and cosmesis.    After the excision of the tumor, the area was carefully  undermined. Hemostasis was obtained with bipolar electrocoagulation.  Closure  was oriented so that the wound was in the patient's natural skin tension lines. The subcutaneous and dermal layers were then closed with 4-0 monocryl sutures. The epidermis was then carefully approximated along the length of the wound using 4-0 monocryl running subcuticular sutures.     Estimated blood loss was less than 10 ml for all surgical sites. A sterile pressure dressing was applied and wound care instructions, with a written handout, were given. The [facility] was [facility] alert and ambulatory.     The patient elected for pathology results to automatically release and understands that the clinical staff will contact them as soon as possible to notify them of the results.     Follow-up in 2 weeks.     Case 2  NAME OF PROCEDURE: Excision intermediate layered linear closure  Staff surgeon: Haroldo Ruff DO  Resident: ALEXIS Mcfarland  Scrub Nurse: Paula Jeffery RN    PRE-OPERATIVE DIAGNOSIS:  Dysplastic nevus  POST-OPERATIVE DIAGNOSIS: Same   LOCATION: right lateral forearm  FINAL EXCISION SIZE(DEFECT SIZE): 1.7x1.6 cm  MARGIN: 4mm  FINAL REPAIR LENGTH: 4.4 cm   ANESTHESIA: 12cc 1% lidocaine with 1:100,000 epinephrine       INDICATIONS: This patient presented with a 0.9x0.8cm Dysplastic nevus. Excision was indicated. We discussed the principles of treatment and most likely complications including scarring, bleeding, infection, incomplete excision, wound dehiscence, pain, nerve damage, and recurrence. Informed consent was obtained and the patient underwent the procedure as follows:    PROCEDURE: The patient was taken to the operative suite. Time-out was performed.  The treatment area was anesthetized with 1% lidocaine with epinephrine. The area was prepped with Chlorhexidine and rinsed with sterile saline and draped with sterile towels. The lesion was delineated and excised down to subcutaneous fat in a elliptical manner. Hemostasis was obtained by electrocoagulation.     REPAIR:  An intermediate layered linear closure was selected as the procedure which would maximally preserve both function and cosmesis.    After the excision of the tumor, the area was carefully undermined. Hemostasis was obtained with bipolar electrocoagulation.  Closure was oriented so that the wound was in the patient's natural skin tension lines. The subcutaneous and dermal layers were then closed with 4-0 monocryl sutures. The epidermis was then carefully approximated along the length of the wound using 4-0 monocryl running subcuticular sutures.     Estimated blood loss was less than 10 ml for all surgical sites. A sterile pressure dressing was applied and wound care instructions, with a written handout, were given. The patient was discharged from the [facility] alert and ambulatory.     The patient elected for pathology results to automatically release and understands that the clinical staff will contact them as soon as possible to notify them of the results.     Follow-up 2 weeks.      Anatomic Pathology Results: pending      Staff Involved:  Medical Student /Staff     I personally performed the procedures today.    Haroldo Ruff DO    Department of Dermatology  [facility]: Phone: [phone], Fax:[phone]  [facility]: Phone: [phone], Fax: [phone]

## 2022-07-11 NOTE — LETTER
7/11/2022         RE: Robert Carolina  [address] [place] [address]        Dear Colleague,    Thank you for referring your patient, Robert Carolina, to the [facility]. Please see a copy of my visit note below.    See procedure note for details.       Again, thank you for allowing me to participate in the care of your patient.        Sincerely,        Haroldo Ruff MD

## 2022-07-11 NOTE — NURSING NOTE
Robert Carolina's goals for this visit include:   Chief Complaint   Patient presents with     Procedure     Excision x 2       He requests these members of his care team be copied on today's visit information: n/a    PCP: No Ref-Primary, Physician    Referring Provider:  No referring provider defined for this encounter.    BP (!) 146/77   Pulse 71     Do you need any medication refills at today's visit? No  Paula Jeffery RN

## 2022-07-13 ENCOUNTER — TELEPHONE (OUTPATIENT)
Dept: DERMATOLOGY | Facility: CLINIC | Age: 67
End: 2022-07-13

## 2022-07-13 LAB
PATH REPORT.COMMENTS IMP SPEC: NORMAL
PATH REPORT.COMMENTS IMP SPEC: NORMAL
PATH REPORT.FINAL DX SPEC: NORMAL
PATH REPORT.GROSS SPEC: NORMAL
PATH REPORT.MICROSCOPIC SPEC OTHER STN: NORMAL
PATH REPORT.RELEVANT HX SPEC: NORMAL

## 2022-07-13 NOTE — TELEPHONE ENCOUNTER
Buffy Bland   7/13/2022  4:04 PM CDT Back to Top        Spoke with patient and discussed results. No further questions or concerns.         Buffy Bland on 7/13/2022 at 4:04 PM    Haroldo Ruff MD   7/13/2022  3:54 PM CDT         Please call the patient with pathology results.     The pathologist thought the excision margins of both excisions were clear. The atypical mole and SCCIS are considered treated. As long as the wound is healing well, no additional surgery is necessary.  Thank you      2 Result Notes    Component  Resulting Agency   Case Report   Surgical Pathology Report                         Case: [id number]                                   Authorizing Provider:  Haroldo Ruff MD         Collected:           07/11/2022 09:07 AM           Ordering Location:     [facility]   Received:            07/11/2022 05:25 PM                                  [place]                                                                   Pathologist:           Santiago Groves MD                                                        Specimens:   A) - Skin, right forearm                                                                             B) - Skin, right lateral abdomen                                                           Final Diagnosis   A(1). R forearm:  - Scar from the prior surgical procedure, with no evidence of residual lesion - (see description)      B(2). R lateral abdomen:  - Scar from the prior surgical procedure, with no evidence of residual lesion - (see description)    Electronically signed by Santiago Groves MD on 7/13/2022 at 12:49 PM   Clinical Information  ERAN            Buffy Bland on 7/13/2022 at 4:05 PM

## 2022-07-13 NOTE — TELEPHONE ENCOUNTER
SUBJECTIVE/OBJECTIVE:                                                    Adriano is 2 days s/p excision of SCCIS a dysplastic nevus.     ASSESSMENT:                                                       NURSING ASSESSMENT:     Wound location: right forearm and right lateral forearm       What are you doing to manage your pain?  Ibuprofen     Is it helping?  yes    Are you applying ice? no    Have you had any noticeable bleeding through the bandage?  No, dressings are dry and intact.    Do you have any concerns?  No     PLAN:                                                       Wound care directions reviewed.  Post op appointment confirmed.  Next skin check has been scheduled.     Paula Jeffery RN

## 2022-07-17 DIAGNOSIS — E78.5 HYPERLIPIDEMIA LDL GOAL <100: ICD-10-CM

## 2022-07-20 RX ORDER — PRAVASTATIN SODIUM 40 MG
40 TABLET ORAL DAILY
Qty: 90 TABLET | Refills: 0 | Status: SHIPPED | OUTPATIENT
Start: 2022-07-20 | End: 2024-05-17 | Stop reason: ALTCHOICE

## 2022-07-25 ENCOUNTER — OFFICE VISIT (OUTPATIENT)
Dept: DERMATOLOGY | Facility: CLINIC | Age: 67
End: 2022-07-25
Payer: COMMERCIAL

## 2022-07-25 DIAGNOSIS — Z85.828 HISTORY OF NONMELANOMA SKIN CANCER: Primary | ICD-10-CM

## 2022-07-25 DIAGNOSIS — Z86.018 HISTORY OF DYSPLASTIC NEVUS: ICD-10-CM

## 2022-07-25 PROCEDURE — 99024 POSTOP FOLLOW-UP VISIT: CPT | Performed by: DERMATOLOGY

## 2022-07-25 ASSESSMENT — PAIN SCALES - GENERAL: PAINLEVEL: NO PAIN (0)

## 2022-07-25 NOTE — LETTER
7/25/2022         RE: Robert Carolina  [address] [place] [address]        Dear Colleague,    Thank you for referring your patient, Robert Carolina, to the [facility]. Please see a copy of my visit note below.    [facility] Dermatology Note  Encounter Date: Jul 25, 2022  Office Visit     Dermatology Problem List:  1. 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 History of DN  - Compound dysplastic nevus with moderate atypia, R mid lateral back 5/27/22; Clinical observation.   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 Wound is healing appropriately today. Review scar will continue to improved over time.   - Can treat it as normal skin.   - Can continue normal activities     # Compound dysplastic nevus with moderate atypia and inflammation, R lateral abdomen, s/p excision and linear repair 7/11/22. Wound is healing appropriately today. Review scar will continue to improved over time.   - Can treat it as normal skin.  - Can continue normal activities     Procedures Performed:   None     Follow-up: 6 months skin check 12/1 22 with Dr. Blackwood     Staff and Scribe:     Scribe Disclosure:   I, Chano Dhillon, am serving as a scribe to document services personally performed by this physician, Dr. Haroldo Ruff, based on data collection and the provider's statements to me.     Provider Disclosure:   The documentation recorded by the scribe accurately reflects the services I personally performed and the decisions made by me.    Haroldo Ruff DO    Department of Dermatology  [facility]:  Phone: [phone], Fax:[phone]  [facility]: Phone: [phone], Fax: [phone]    ____________________________________________    CC: Wound Check (Right forearm and abdomen)    HPI:  Mr. Robert Carolina is a(n) 66 year old male who presents today as a return patient for a wound check.     Last seen on 7/11/22 for an excision on the right forearm (SCCIS) and right lateral abdomen (DN).    Today, patient presents for a wound check on the right forearm and right lateral abdomen. Patient notes soreness when the plastic was pulling on the skin.     Patient is otherwise feeling well, without additional skin concerns.    Labs Reviewed:  N/A    Physical Exam:  Vitals: There were no vitals taken for this visit.  SKIN: Focused examination of right forearm and abdomen was performed.  - Skin edges closed. Minimal adjacent erythema or edema   - No other lesions of concern on areas examined.     Medications:  Current Outpatient Medications   Medication     ALAVERT OR     COMPOUNDED NON-CONTROLLED SUBSTANCE (CMPD RX) - PHARMACY TO MIX COMPOUNDED MEDICATION     levothyroxine (SYNTHROID/LEVOTHROID) 100 MCG tablet     metFORMIN (GLUCOPHAGE-XR) 500 MG 24 hr tablet     OCUVITE OR     omeprazole (PRILOSEC) 40 MG DR capsule     pravastatin (PRAVACHOL) 40 MG tablet     terazosin (HYTRIN) 5 MG capsule     TYLENOL 325 MG PO TABS     No current facility-administered medications for this visit.      Past Medical History:   Patient Active Problem List   Diagnosis     Nasal polyposis     Chronic sinusitis     Advanced directives, counseling/discussion     SCOTT (obstructive sleep apnea)-Mild (AHI 6)     Nevus of multiple sites of trunk     Hypothyroidism due to Hashimoto's thyroiditis     Dupuytren's contracture of both hands     Personal history of gastric ulcer     Nocturia associated with benign prostatic hyperplasia     Hypertension, goal below 140/90     Family hx of prostate cancer      Diabetes mellitus, type 2 (H)     Hyperlipidemia LDL goal <100     Past Medical History:   Diagnosis Date     Bleeding ulcer 2008    Duodenal      Chronic sinusitis      Seasonal allergies         CC No referring provider defined for this encounter. on close of this encounter.      Again, thank you for allowing me to participate in the care of your patient.        Sincerely,        Haroldo Ruff MD

## 2022-07-25 NOTE — PROGRESS NOTES
[facility] Dermatology Note  Encounter Date: Jul 25, 2022  Office Visit     Dermatology Problem List:  1. 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 History of DN  - Compound dysplastic nevus with moderate atypia, R mid lateral back 5/27/22; Clinical observation.   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 Wound is healing appropriately today. Review scar will continue to improved over time.   - Can treat it as normal skin.   - Can continue normal activities     # Compound dysplastic nevus with moderate atypia and inflammation, R lateral abdomen, s/p excision and linear repair 7/11/22. Wound is healing appropriately today. Review scar will continue to improved over time.   - Can treat it as normal skin.  - Can continue normal activities     Procedures Performed:   None     Follow-up: 6 months skin check 12/1 22 with Dr. Blackwood     Staff and Scribe:     Scribe Disclosure:   Chano TRAN, am serving as a scribe to document services personally performed by this physician, Dr. Haroldo Ruff, based on data collection and the provider's statements to me.     Provider Disclosure:   The documentation recorded by the scribe accurately reflects the services I personally performed and the decisions made by me.    Haroldo Ruff DO    Department of Dermatology  [facility]: Phone: [phone], Fax:[phone]  [facility]: Phone: [phone], Fax: [phone]    ____________________________________________    CC: Wound Check (Right forearm and abdomen)    HPI:  Mr. Robert PATEL  Ge is a(n) 66 year old male who presents today as a return patient for a wound check.     Last seen on 7/11/22 for an excision on the right forearm (SCCIS) and right lateral abdomen (DN).    Today, patient presents for a wound check on the right forearm and right lateral abdomen. Patient notes soreness when the plastic was pulling on the skin.     Patient is otherwise feeling well, without additional skin concerns.    Labs Reviewed:  N/A    Physical Exam:  Vitals: There were no vitals taken for this visit.  SKIN: Focused examination of right forearm and abdomen was performed.  - Skin edges closed. Minimal adjacent erythema or edema   - No other lesions of concern on areas examined.     Medications:  Current Outpatient Medications   Medication     ALAVERT OR     COMPOUNDED NON-CONTROLLED SUBSTANCE (CMPD RX) - PHARMACY TO MIX COMPOUNDED MEDICATION     levothyroxine (SYNTHROID/LEVOTHROID) 100 MCG tablet     metFORMIN (GLUCOPHAGE-XR) 500 MG 24 hr tablet     OCUVITE OR     omeprazole (PRILOSEC) 40 MG DR capsule     pravastatin (PRAVACHOL) 40 MG tablet     terazosin (HYTRIN) 5 MG capsule     TYLENOL 325 MG PO TABS     No current facility-administered medications for this visit.      Past Medical History:   Patient Active Problem List   Diagnosis     Nasal polyposis     Chronic sinusitis     Advanced directives, counseling/discussion     SCOTT (obstructive sleep apnea)-Mild (AHI 6)     Nevus of multiple sites of trunk     Hypothyroidism due to Hashimoto's thyroiditis     Dupuytren's contracture of both hands     Personal history of gastric ulcer     Nocturia associated with benign prostatic hyperplasia     Hypertension, goal below 140/90     Family hx of prostate cancer     Diabetes mellitus, type 2 (H)     Hyperlipidemia LDL goal <100     Past Medical History:   Diagnosis Date     Bleeding ulcer 2008    Duodenal      Chronic sinusitis      Seasonal allergies         CC No referring provider defined for this encounter. on  close of this encounter.

## 2022-07-25 NOTE — NURSING NOTE
Robert Carolina's goals for this visit include:   Chief Complaint   Patient presents with     Wound Check     Right forearm and abdomen       He requests these members of his care team be copied on today's visit information:     PCP: No Ref-Primary, Physician    Referring Provider:  No referring provider defined for this encounter.    There were no vitals taken for this visit.    Do you need any medication refills at today's visit? Ronit Garcia LPN

## 2022-08-02 DIAGNOSIS — J32.4 CHRONIC PANSINUSITIS: Primary | ICD-10-CM

## 2022-08-02 RX ORDER — FLUTICASONE PROPIONATE 50 MCG
2 SPRAY, SUSPENSION (ML) NASAL DAILY
Qty: 16 G | Refills: 1 | Status: SHIPPED | OUTPATIENT
Start: 2022-08-02 | End: 2022-08-29

## 2022-08-28 ENCOUNTER — HEALTH MAINTENANCE LETTER (OUTPATIENT)
Age: 67
End: 2022-08-28

## 2022-09-15 NOTE — PROGRESS NOTES
History of Present Illness - Robert Carolina is a very pleasant 66 year old male last seen on 3/28/2022    He is status post functinoal endoscopic sinus surgery for polyposis on 7/21/2011.    To review the year 2013, on exam at the 6/10/2013 visit, things looked good, with some residual disease on the LEFT.  I had him continue the rinses and at the visit on 9/16/13 visit there was still some polypoid disease on the LEFT side.    At the 12/16/13 visit things looked the best I had seen in a year, and I asked him to continue the same irrigation regimen, and also discussed adding surfactant.  He did not try the surfactant at that point, and tells me that unfortunately things had taken a slight turn for the worse at the beginning of 2014.  He could tell because he feels more congested and has a bit of a sore throat from increased post nasal drainage.  And at the 3/17/2014 visit there was clearly endoscopic evidence of purulent disease on the RIGHT side.  I added itraconazole to his Gent and Dex regimen, and also treated with 14 days of bactrim at the 3/17/14 visit.    At the 12/15/14 visit he told me that the only change he had made was adding a drop of baby shampoo to his sinus saline irrigations, and he is happy to report that he thinks it is helping.  His nose is still feeling great, much more open and clear, and there has been no facial pressure.    At the 6/23/2014 visit endoscopy showed his nose looked the best it had in years.    At 12/15/14 visit and the 4/13/15 visit, no changes, still using the rinses with baby shampoo in a saline, as well as medicated rinses 4x/Week.  Unfortunately at the 10/12/15 visit, there was a lot of purulent drainage seen on the LEFT.  SO we doubled the use of the compound irrigations and that got things under control.  So overall in 2015 and 2016, he has maintained himself on really nothing more than saline NeilMed irrigations with a bit of baby shampoo.    At the 8/22/17 visit, he  also had issues with a chronic tickling dry cough.  It seemed to be laryngopharyngeal reflux< so I started reflux medication and he is also here to follow up on that issue as well.  He tells me that the reflux medicaiton didn't seem to help, so he is trying dietary modifications.    At the 8//22/2017 exam, I did find a small early polyp on the LEFT side.  We continued medications, but he did end up having a full blown sinusitis in October.  Since that time, and also at the visit on 12/12/17, there was persistent polyp growth on the LEFT, and then again on the RIGHT.  Treatment with medicated irrigations, but there was still recurrent polyposis, and so at the end of the 12/12 visit I added Budesonide in NeilMed irrigations.      But at this point, he was only on Gent Dex irrigations daily, but his nose is still a problem on occasion with thick and occasionally bloody drainage.  But at the April 2021 visit, he also had a new issue of a submental neck mass.  CT was done and showed what was most consistent with reactive node, no other masses. But given the size, I did request a needle biopsy done, but as far as I can tell that was not done.    At the follow up on 1/27/22 he told me that about a month prior he started having congestion and bloody crusting, so he took an old prescription of Biaxin about 2-3 weeks ago.  He tells me that it helped, but not completely.  He has been on Gent Dex irrigations daily, as well as saline rinses with baby shampoo.    ON exam at that visit, there was hemalatha purulent crusts, LEFT more than RIGHT and I cultured.  Based on those results I changed him to a Clindamycin nasal irrigation and he is here for follow up.  I have not done a new CT sinus since early 2021.    At the 3/28/2022 visit, endoscopic exam finally showed resolution.  I asked him to just use the clinda rinses for long term maintenance and he is here for follow up.    Since seeing me 6 months ago, he did have a sinus  infection and was treated with oral antibiotics but it did not seem to clear up. He is still on his long term saline with baby shampoo, and the clindamycin irrigations.      Past Medical History -   Patient Active Problem List   Diagnosis     Nasal polyposis     Chronic sinusitis     Advanced directives, counseling/discussion     SCOTT (obstructive sleep apnea)-Mild (AHI 6)     Nevus of multiple sites of trunk     Hypothyroidism due to Hashimoto's thyroiditis     Dupuytren's contracture of both hands     Personal history of gastric ulcer     Nocturia associated with benign prostatic hyperplasia     Hypertension, goal below 140/90     Family hx of prostate cancer     Diabetes mellitus, type 2 (H)     Hyperlipidemia LDL goal <100       Current Medications -   Current Outpatient Medications:      ALAVERT OR, once daily, Disp: , Rfl:      COMPOUNDED NON-CONTROLLED SUBSTANCE (CMPD RX) - PHARMACY TO MIX COMPOUNDED MEDICATION, Apply 20 mLs into each nare 2 times daily Clindamycin 300mg/liter saline, Disp: 2000 mL, Rfl: 3     fluticasone (FLONASE) 50 MCG/ACT nasal spray, SHAKE LIQUID AND USE 2 SPRAYS IN EACH NOSTRIL DAILY, Disp: 48 g, Rfl: 1     levothyroxine (SYNTHROID/LEVOTHROID) 100 MCG tablet, TAKE 1 TABLET(100 MCG) BY MOUTH DAILY, Disp: 90 tablet, Rfl: 3     metFORMIN (GLUCOPHAGE-XR) 500 MG 24 hr tablet, Take 1 tablet (500 mg) by mouth daily (with dinner), Disp: 90 tablet, Rfl: 3     OCUVITE OR, once daily, Disp: , Rfl:      omeprazole (PRILOSEC) 40 MG DR capsule, Take 1 capsule (40 mg) by mouth daily, Disp: 90 capsule, Rfl: 3     pravastatin (PRAVACHOL) 40 MG tablet, Take 1 tablet (40 mg) by mouth daily Please follow up in clinic for next refill., Disp: 90 tablet, Rfl: 0     terazosin (HYTRIN) 5 MG capsule, Take 1 capsule (5 mg) by mouth At Bedtime, Disp: 90 capsule, Rfl: 1     TYLENOL 325 MG PO TABS, 4 time per week, Disp: , Rfl:     Allergies -   Allergies   Allergen Reactions     Ciprofloxacin Other (See Comments)      Leg pain     Carbaphen Nausea     Ceftin GI Disturbance     Cefuroxime Nausea     Other reaction(s): Stomach Upset  Other reaction(s): Gastrointestinal  Other reaction(s): Stomach Upset  Other reaction(s): Stomach Upset     No Clinical Screening - See Comments Rash     Oxycodone-Acetaminophen Rash       Social History -   Social History     Socioeconomic History     Marital status:      Spouse name: Not on file     Number of children: Not on file     Years of education: Not on file     Highest education level: Not on file   Occupational History     Not on file   Social Needs     Financial resource strain: Not on file     Food insecurity     Worry: Not on file     Inability: Not on file     Transportation needs     Medical: Not on file     Non-medical: Not on file   Tobacco Use     Smoking status: Never Smoker     Smokeless tobacco: Never Used   Substance and Sexual Activity     Alcohol use: Yes     Comment: 5 per week     Drug use: No     Sexual activity: Yes     Partners: Female     Birth control/protection: None   Lifestyle     Physical activity     Days per week: Not on file     Minutes per session: Not on file     Stress: Not on file   Relationships     Social connections     Talks on phone: Not on file     Gets together: Not on file     Attends Hindu service: Not on file     Active member of club or organization: Not on file     Attends meetings of clubs or organizations: Not on file     Relationship status: Not on file     Intimate partner violence     Fear of current or ex partner: Not on file     Emotionally abused: Not on file     Physically abused: Not on file     Forced sexual activity: Not on file   Other Topics Concern     Parent/sibling w/ CABG, MI or angioplasty before 65F 55M? Not Asked   Social History Narrative     Not on file       Family History -   Family History   Problem Relation Age of Onset     Diabetes Mother      Skin Cancer Father      Prostate Cancer Brother      Melanoma No  family hx of        Review of Systems - As per HPI and PMHx, otherwise 10+ system review of the head and neck, and general constitution is negative.    Physical Exam  /78 (BP Location: Right arm)   Pulse 68   Wt 70.8 kg (156 lb)   SpO2 100%   BMI 22.71 kg/m      General - The patient is well nourished and well developed, and appears to have good nutritional status.  Alert and oriented to person and place, answers questions and cooperates with examination appropriately.   Head and Face - Normocephalic and atraumatic, with no gross asymmetry noted of the contour of the facial features.  The facial nerve is intact, with strong symmetric movements.  Voice and Breathing - The patient was breathing comfortably without the use of accessory muscles. There was no wheezing, stridor, or stertor.  The patients voice was clear and strong, and had appropriate pitch and quality.  Ears - The tympanic membranes are normal in appearance, bony landmarks are intact.  No retraction, perforation, or masses.  No fluid or purulence was seen in the external canal or the middle ear. No evidence of infection of the middle ear or external canal, cerumen was normal in appearance.  Eyes - Extraocular movements intact, and the pupils were reactive to light.  Sclera were not icteric or injected, conjunctiva were pink and moist.      PROCEDURE  To evaluate the nose in the postoperative state I performed rigid nasal endoscopy.  I first sprayed with lidocaine and neosynephrine.  I began with the LEFT side using a 2.7mm 30 degree rigid nasal endoscope, and color photographs were taken for the medical record.    The middle meatus was open, and I was able to pass the scope through.  The LEFT maxillary antrostomy is open and there was a clearly visible purulent stream of drainage that I cultured directly..  Going further back, the ethmoid roof is nicely re-mucosalized, and there is no abnormal secretions or polypoid degeneration.    The right  side was then examined.  The middle meatus was open and I visualized the RIGHT antrostomy was open. The middle meatus is clear and open now. Going further back the ethmoid system looks good.  The mucosa is healthy, and there were polyps or polypoid degenerations.              A/P - Robert Carolina is a 65 year old male  (J32.4) Chronic pansinusitis  (primary encounter diagnosis)  (J33.9) Nasal polyposis    I have cultured, and will base our treatment on the results.

## 2022-09-19 ENCOUNTER — OFFICE VISIT (OUTPATIENT)
Dept: OTOLARYNGOLOGY | Facility: CLINIC | Age: 67
End: 2022-09-19
Payer: COMMERCIAL

## 2022-09-19 VITALS
DIASTOLIC BLOOD PRESSURE: 78 MMHG | HEART RATE: 68 BPM | BODY MASS INDEX: 22.71 KG/M2 | SYSTOLIC BLOOD PRESSURE: 135 MMHG | WEIGHT: 156 LBS | OXYGEN SATURATION: 100 %

## 2022-09-19 DIAGNOSIS — J32.4 CHRONIC PANSINUSITIS: Primary | ICD-10-CM

## 2022-09-19 DIAGNOSIS — J33.9 NASAL POLYPOSIS: ICD-10-CM

## 2022-09-19 PROCEDURE — 87075 CULTR BACTERIA EXCEPT BLOOD: CPT | Performed by: OTOLARYNGOLOGY

## 2022-09-19 PROCEDURE — 31231 NASAL ENDOSCOPY DX: CPT | Performed by: OTOLARYNGOLOGY

## 2022-09-19 PROCEDURE — 87076 CULTURE ANAEROBE IDENT EACH: CPT | Mod: 91 | Performed by: OTOLARYNGOLOGY

## 2022-09-19 PROCEDURE — 99213 OFFICE O/P EST LOW 20 MIN: CPT | Mod: 25 | Performed by: OTOLARYNGOLOGY

## 2022-09-19 PROCEDURE — 87077 CULTURE AEROBIC IDENTIFY: CPT | Performed by: OTOLARYNGOLOGY

## 2022-09-19 PROCEDURE — 87070 CULTURE OTHR SPECIMN AEROBIC: CPT | Performed by: OTOLARYNGOLOGY

## 2022-09-19 NOTE — LETTER
9/19/2022         RE: Robert Carolina  [address] [place] [address]        Dear Colleague,    Thank you for referring your patient, Robert Carolina, to the [facility]. Please see a copy of my visit note below.    History of Present Illness - Robert Carolina is a very pleasant 66 year old male last seen on 3/28/2022    He is status post functinoal endoscopic sinus surgery for polyposis on 7/21/2011.    To review the year 2013, on exam at the 6/10/2013 visit, things looked good, with some residual disease on the LEFT.  I had him continue the rinses and at the visit on 9/16/13 visit there was still some polypoid disease on the LEFT side.    At the 12/16/13 visit things looked the best I had seen in a year, and I asked him to continue the same irrigation regimen, and also discussed adding surfactant.  He did not try the surfactant at that point, and tells me that unfortunately things had taken a slight turn for the worse at the beginning of 2014.  He could tell because he feels more congested and has a bit of a sore throat from increased post nasal drainage.  And at the 3/17/2014 visit there was clearly endoscopic evidence of purulent disease on the RIGHT side.  I added itraconazole to his Gent and Dex regimen, and also treated with 14 days of bactrim at the 3/17/14 visit.    At the 12/15/14 visit he told me that the only change he had made was adding a drop of baby shampoo to his sinus saline irrigations, and he is happy to report that he thinks it is helping.  His nose is still feeling great, much more open and clear, and there has been no facial pressure.    At the 6/23/2014 visit endoscopy showed his nose looked the best it had in years.    At 12/15/14 visit and the 4/13/15 visit, no changes, still using the rinses with baby shampoo in a saline, as well as medicated rinses 4x/Week.  Unfortunately at the 10/12/15 visit, there was a lot of purulent drainage seen on the LEFT.   SO we doubled the use of the compound irrigations and that got things under control.  So overall in 2015 and 2016, he has maintained himself on really nothing more than saline NeilMed irrigations with a bit of baby shampoo.    At the 8/22/17 visit, he also had issues with a chronic tickling dry cough.  It seemed to be laryngopharyngeal reflux< so I started reflux medication and he is also here to follow up on that issue as well.  He tells me that the reflux medicaiton didn't seem to help, so he is trying dietary modifications.    At the 8//22/2017 exam, I did find a small early polyp on the LEFT side.  We continued medications, but he did end up having a full blown sinusitis in October.  Since that time, and also at the visit on 12/12/17, there was persistent polyp growth on the LEFT, and then again on the RIGHT.  Treatment with medicated irrigations, but there was still recurrent polyposis, and so at the end of the 12/12 visit I added Budesonide in NeilMed irrigations.      But at this point, he was only on Gent Dex irrigations daily, but his nose is still a problem on occasion with thick and occasionally bloody drainage.  But at the April 2021 visit, he also had a new issue of a submental neck mass.  CT was done and showed what was most consistent with reactive node, no other masses. But given the size, I did request a needle biopsy done, but as far as I can tell that was not done.    At the follow up on 1/27/22 he told me that about a month prior he started having congestion and bloody crusting, so he took an old prescription of Biaxin about 2-3 weeks ago.  He tells me that it helped, but not completely.  He has been on Gent Dex irrigations daily, as well as saline rinses with baby shampoo.    ON exam at that visit, there was hemalatha purulent crusts, LEFT more than RIGHT and I cultured.  Based on those results I changed him to a Clindamycin nasal irrigation and he is here for follow up.  I have not done a new CT  sinus since early 2021.    At the 3/28/2022 visit, endoscopic exam finally showed resolution.  I asked him to just use the clinda rinses for long term maintenance and he is here for follow up.    Since seeing me 6 months ago, he did have a sinus infection and was treated with oral antibiotics but it did not seem to clear up. He is still on his long term saline with baby shampoo, and the clindamycin irrigations.      Past Medical History -   Patient Active Problem List   Diagnosis     Nasal polyposis     Chronic sinusitis     Advanced directives, counseling/discussion     SCOTT (obstructive sleep apnea)-Mild (AHI 6)     Nevus of multiple sites of trunk     Hypothyroidism due to Hashimoto's thyroiditis     Dupuytren's contracture of both hands     Personal history of gastric ulcer     Nocturia associated with benign prostatic hyperplasia     Hypertension, goal below 140/90     Family hx of prostate cancer     Diabetes mellitus, type 2 (H)     Hyperlipidemia LDL goal <100       Current Medications -   Current Outpatient Medications:      ALAVERT OR, once daily, Disp: , Rfl:      COMPOUNDED NON-CONTROLLED SUBSTANCE (CMPD RX) - PHARMACY TO MIX COMPOUNDED MEDICATION, Apply 20 mLs into each nare 2 times daily Clindamycin 300mg/liter saline, Disp: 2000 mL, Rfl: 3     fluticasone (FLONASE) 50 MCG/ACT nasal spray, SHAKE LIQUID AND USE 2 SPRAYS IN EACH NOSTRIL DAILY, Disp: 48 g, Rfl: 1     levothyroxine (SYNTHROID/LEVOTHROID) 100 MCG tablet, TAKE 1 TABLET(100 MCG) BY MOUTH DAILY, Disp: 90 tablet, Rfl: 3     metFORMIN (GLUCOPHAGE-XR) 500 MG 24 hr tablet, Take 1 tablet (500 mg) by mouth daily (with dinner), Disp: 90 tablet, Rfl: 3     OCUVITE OR, once daily, Disp: , Rfl:      omeprazole (PRILOSEC) 40 MG DR capsule, Take 1 capsule (40 mg) by mouth daily, Disp: 90 capsule, Rfl: 3     pravastatin (PRAVACHOL) 40 MG tablet, Take 1 tablet (40 mg) by mouth daily Please follow up in clinic for next refill., Disp: 90 tablet, Rfl: 0      terazosin (HYTRIN) 5 MG capsule, Take 1 capsule (5 mg) by mouth At Bedtime, Disp: 90 capsule, Rfl: 1     TYLENOL 325 MG PO TABS, 4 time per week, Disp: , Rfl:     Allergies -   Allergies   Allergen Reactions     Ciprofloxacin Other (See Comments)     Leg pain     Carbaphen Nausea     Ceftin GI Disturbance     Cefuroxime Nausea     Other reaction(s): Stomach Upset  Other reaction(s): Gastrointestinal  Other reaction(s): Stomach Upset  Other reaction(s): Stomach Upset     No Clinical Screening - See Comments Rash     Oxycodone-Acetaminophen Rash       Social History -   Social History     Socioeconomic History     Marital status:      Spouse name: Not on file     Number of children: Not on file     Years of education: Not on file     Highest education level: Not on file   Occupational History     Not on file   Social Needs     Financial resource strain: Not on file     Food insecurity     Worry: Not on file     Inability: Not on file     Transportation needs     Medical: Not on file     Non-medical: Not on file   Tobacco Use     Smoking status: Never Smoker     Smokeless tobacco: Never Used   Substance and Sexual Activity     Alcohol use: Yes     Comment: 5 per week     Drug use: No     Sexual activity: Yes     Partners: Female     Birth control/protection: None   Lifestyle     Physical activity     Days per week: Not on file     Minutes per session: Not on file     Stress: Not on file   Relationships     Social connections     Talks on phone: Not on file     Gets together: Not on file     Attends Mu-ism service: Not on file     Active member of club or organization: Not on file     Attends meetings of clubs or organizations: Not on file     Relationship status: Not on file     Intimate partner violence     Fear of current or ex partner: Not on file     Emotionally abused: Not on file     Physically abused: Not on file     Forced sexual activity: Not on file   Other Topics Concern     Parent/sibling w/ CABG,  MI or angioplasty before 65F 55M? Not Asked   Social History Narrative     Not on file       Family History -   Family History   Problem Relation Age of Onset     Diabetes Mother      Skin Cancer Father      Prostate Cancer Brother      Melanoma No family hx of        Review of Systems - As per HPI and PMHx, otherwise 10+ system review of the head and neck, and general constitution is negative.    Physical Exam  /78 (BP Location: Right arm)   Pulse 68   Wt 70.8 kg (156 lb)   SpO2 100%   BMI 22.71 kg/m      General - The patient is well nourished and well developed, and appears to have good nutritional status.  Alert and oriented to person and place, answers questions and cooperates with examination appropriately.   Head and Face - Normocephalic and atraumatic, with no gross asymmetry noted of the contour of the facial features.  The facial nerve is intact, with strong symmetric movements.  Voice and Breathing - The patient was breathing comfortably without the use of accessory muscles. There was no wheezing, stridor, or stertor.  The patients voice was clear and strong, and had appropriate pitch and quality.  Ears - The tympanic membranes are normal in appearance, bony landmarks are intact.  No retraction, perforation, or masses.  No fluid or purulence was seen in the external canal or the middle ear. No evidence of infection of the middle ear or external canal, cerumen was normal in appearance.  Eyes - Extraocular movements intact, and the pupils were reactive to light.  Sclera were not icteric or injected, conjunctiva were pink and moist.      PROCEDURE  To evaluate the nose in the postoperative state I performed rigid nasal endoscopy.  I first sprayed with lidocaine and neosynephrine.  I began with the LEFT side using a 2.7mm 30 degree rigid nasal endoscope, and color photographs were taken for the medical record.    The middle meatus was open, and I was able to pass the scope through.  The LEFT  maxillary antrostomy is open and there was a clearly visible purulent stream of drainage that I cultured directly..  Going further back, the ethmoid roof is nicely re-mucosalized, and there is no abnormal secretions or polypoid degeneration.    The right side was then examined.  The middle meatus was open and I visualized the RIGHT antrostomy was open. The middle meatus is clear and open now. Going further back the ethmoid system looks good.  The mucosa is healthy, and there were polyps or polypoid degenerations.              A/P - Robert Carolina is a 65 year old male  (J32.4) Chronic pansinusitis  (primary encounter diagnosis)  (J33.9) Nasal polyposis    I have cultured, and will base our treatment on the results.        Again, thank you for allowing me to participate in the care of your patient.        Sincerely,        Colton Wood MD

## 2022-09-19 NOTE — NURSING NOTE
"Chief Complaint   Patient presents with     RECHECK       Vitals:    09/19/22 1221   BP: 135/78   BP Location: Right arm   Pulse: 68   SpO2: 100%   Weight: 70.8 kg (156 lb)     Wt Readings from Last 1 Encounters:   09/19/22 70.8 kg (156 lb)     Ht Readings from Last 1 Encounters:   05/06/21 1.765 m (5' 9.5\")       Eleonora Ardon [facility], 9/19/2022 12:21 PM    "

## 2022-09-22 LAB — BACTERIA SPEC CULT: ABNORMAL

## 2022-09-27 LAB
BACTERIA SPEC CULT: ABNORMAL
BACTERIA SPEC CULT: ABNORMAL

## 2022-09-28 ENCOUNTER — MYC MEDICAL ADVICE (OUTPATIENT)
Dept: OTOLARYNGOLOGY | Facility: CLINIC | Age: 67
End: 2022-09-28

## 2022-09-28 DIAGNOSIS — J32.4 CHRONIC PANSINUSITIS: Primary | ICD-10-CM

## 2022-09-29 RX ORDER — CLINDAMYCIN HCL 300 MG
300 CAPSULE ORAL 3 TIMES DAILY
Qty: 63 CAPSULE | Refills: 1 | Status: SHIPPED | OUTPATIENT
Start: 2022-09-29 | End: 2023-09-07

## 2022-10-28 ENCOUNTER — MYC MEDICAL ADVICE (OUTPATIENT)
Dept: OTOLARYNGOLOGY | Facility: CLINIC | Age: 67
End: 2022-10-28

## 2022-10-28 DIAGNOSIS — J32.4 CHRONIC PANSINUSITIS: ICD-10-CM

## 2022-11-02 ENCOUNTER — TELEPHONE (OUTPATIENT)
Dept: OTOLARYNGOLOGY | Facility: CLINIC | Age: 67
End: 2022-11-02

## 2022-11-02 DIAGNOSIS — J32.4 CHRONIC PANSINUSITIS: ICD-10-CM

## 2022-11-02 NOTE — TELEPHONE ENCOUNTER
New Medication Request        What medication are you requesting?: clindamycin     Reason for medication request: the Rx is on extreme shortage. Requesting change in Rx.    Have you taken this medication before?: Yes:     Controlled Substance Agreement on file:   CSA -- Patient Level:    CSA: None found at the patient level.         Patient offered an appointment? No    Preferred Pharmacy:   [id number] - [place], [place] - [address] [facility] [address]  [address] [place] [address]  Phone: [phone] Fax: [phone]    [facility], [place] - [address]  [address] [address]  Phone: [phone] Fax: [phone]    [facility] - [place], [place] - [address]   [address] [facility] [address]  Phone: [phone] Fax: [phone]      Could we send this information to you in [place] or would you prefer to receive a phone call?:   NA  Okay to leave a detailed message?: No at Other phone number:  [facility] [phone]

## 2022-11-02 NOTE — TELEPHONE ENCOUNTER
Called and spoke with Adriano, and he got a call from [facility] and they are out of Clindamycin to use.    I will change to Vancomycin 1000mcg/mL instead

## 2023-01-04 ENCOUNTER — TELEPHONE (OUTPATIENT)
Dept: DERMATOLOGY | Facility: CLINIC | Age: 68
End: 2023-01-04

## 2023-01-04 NOTE — TELEPHONE ENCOUNTER
Writer called pt and got him scheduled on Dr Pal reschedule day 1/23/23 since his appointment had to be cancelled due to provider being out 12/1/22.    Senait Harkins RN on 1/4/2023 at 4:35 PM

## 2023-01-04 NOTE — TELEPHONE ENCOUNTER
[facility] Call Center    Phone Message    May a detailed message be left on voicemail: no     Reason for Call: Other: Pt states he would like to reschedule the 12/1/22 visit that had been cancelled per provider's request. Pt is scheduled for 6/1/23 but states he is supposed to be seen sooner. Please call Pt back to discuss. Thank you.      Action Taken: Message routed to:  Adult Clinics: Dermatology p [phone]    Travel Screening: Not Applicable

## 2023-01-23 ENCOUNTER — OFFICE VISIT (OUTPATIENT)
Dept: DERMATOLOGY | Facility: CLINIC | Age: 68
End: 2023-01-23
Payer: COMMERCIAL

## 2023-01-23 DIAGNOSIS — Z80.8 FAMILY HISTORY OF NONMELANOMA SKIN CANCER: ICD-10-CM

## 2023-01-23 DIAGNOSIS — L73.9 FOLLICULITIS: ICD-10-CM

## 2023-01-23 DIAGNOSIS — L82.1 SK (SEBORRHEIC KERATOSIS): ICD-10-CM

## 2023-01-23 DIAGNOSIS — L81.4 SOLAR LENTIGO: Primary | ICD-10-CM

## 2023-01-23 DIAGNOSIS — L98.9 SKIN LESION: ICD-10-CM

## 2023-01-23 DIAGNOSIS — L57.0 AK (ACTINIC KERATOSIS): ICD-10-CM

## 2023-01-23 PROCEDURE — 17000 DESTRUCT PREMALG LESION: CPT | Performed by: DERMATOLOGY

## 2023-01-23 PROCEDURE — 99213 OFFICE O/P EST LOW 20 MIN: CPT | Mod: 25 | Performed by: DERMATOLOGY

## 2023-01-23 PROCEDURE — 17003 DESTRUCT PREMALG LES 2-14: CPT | Performed by: DERMATOLOGY

## 2023-01-23 RX ORDER — CLINDAMYCIN PHOSPHATE 10 UG/ML
LOTION TOPICAL
Qty: 60 ML | Refills: 1 | Status: SHIPPED | OUTPATIENT
Start: 2023-01-23

## 2023-01-23 ASSESSMENT — PAIN SCALES - GENERAL: PAINLEVEL: NO PAIN (0)

## 2023-01-23 NOTE — NURSING NOTE
Robert Carolina's chief complaint for this visit includes:  Chief Complaint   Patient presents with     Skin Check     Areas of concern: nose, bilateral sides, and left buttock. Hx of SCCIS     PCP: No Ref-Primary, Physician    Referring Provider:  No referring provider defined for this encounter.    There were no vitals taken for this visit.  No Pain (0)        Allergies   Allergen Reactions     Ciprofloxacin Other (See Comments)     Leg pain     Carbaphen Nausea     Ceftin GI Disturbance     Cefuroxime Nausea     Other reaction(s): Stomach Upset  Other reaction(s): Gastrointestinal  Other reaction(s): Stomach Upset  Other reaction(s): Stomach Upset     No Clinical Screening - See Comments Rash     Oxycodone-Acetaminophen Rash         Do you need any medication refills at today's visit?    No

## 2023-01-23 NOTE — PROGRESS NOTES
[facility] Dermatology Note  Encounter Date: Jan 23, 2023  Office Visit     Dermatology Problem List:  1. 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 History of DN  - Compound dysplastic nevus with moderate atypia, R mid lateral back 5/27/22; Clinical observation.   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 AK s/p cryo     ____________________________________________     Assessment & Plan:    # History of dysplastic nevi, no clinical evidence of recurrence.  - ABCDEs: Counseled ABCDEs of melanoma: Asymmetry, Border (irregularity), Color (not uniform, changes in color), Diameter (greater than 6 mm which is about the size of a pencil eraser), and Evolving (any changes in preexisting moles).  - Sun protection: Counseled SPF30+ sunscreen, UPF clothing, sun avoidance, tanning bed avoidance.  - Recommended regular skin checks.     # Actinic keratosis - forehead. Based on the location and chronic irritation to this lesion, treatment with cryotherapy is warranted.   - See cryo procedure note.     # MArch 31st, recheck folliculitis, thighs buttocks hip to confirm resolved    Procedures Performed:   - Cryotherapy procedure note, location(s): forehead and scalp. After verbal consent and discussion of risks and benefits including, but not limited to, dyspigmentation/scar, blister, and pain, 7 lesion(s) was(were) treated with 1-2 mm freeze border for 1-2 cycles with liquid nitrogen. Post cryotherapy instructions were provided.         Follow-up:  Spot check as above, skin check in 6 months    Staff and Scribe:     Scribe Disclosure:   IZachery, am serving as a scribe to document services personally performed by this physician, Dr. Alisha Blackwood, based on data collection and the provider's statements to me.     Provider  Disclosure:   The documentation recorded by the scribe accurately reflects the services I personally performed and the decisions made by me.    Alisha Blackwood MD    Department of Dermatology  [facility]: Phone: [phone], Fax:[phone]  [facility]: Phone: [phone], Fax: [phone]    ____________________________________________    CC: Skin Check (Areas of concern: nose, bilateral sides, and left buttock. Hx of SCCIS)    HPI:  Mr. Robert Carolina is a(n) 67 year old male who presents today as a return patient for  Skin cancer    Last seen 7/25/22 by Dr. Ruff for a wound check.    Today,  Wants spots on face and also the arm and also the hip and buttocks checked.     Patient is otherwise feeling well, without additional skin concerns.    Labs Reviewed:  N/A    Physical Exam:  Vitals: There were no vitals taken for this visit.  SKIN: Total skin excluding the undergarment areas was performed. The exam included the head/face, neck, both arms, chest, back, abdomen, both legs, digits and/or nails.  Mary ibarra LPN  -There is/are  erythematous macules with overlying adherent scale on the forehead and temples   -well healed skin cancer scar  -perifollicular papules on the buttocks and hips  - No other lesions of concern on areas examined.     Medications:  Current Outpatient Medications   Medication     ALAVERT OR     clindamycin (CLEOCIN T) 1 % external lotion     COMPOUNDED NON-CONTROLLED SUBSTANCE (CMPD RX) - PHARMACY TO MIX COMPOUNDED MEDICATION     fluticasone (FLONASE) 50 MCG/ACT nasal spray     levothyroxine (SYNTHROID/LEVOTHROID) 100 MCG tablet     metFORMIN (GLUCOPHAGE-XR) 500 MG 24 hr tablet     OCUVITE OR     omeprazole (PRILOSEC) 40 MG DR capsule     pravastatin (PRAVACHOL) 40 MG tablet     terazosin (HYTRIN) 5 MG capsule     TYLENOL 325 MG PO TABS     No current  facility-administered medications for this visit.      Past Medical History:   Patient Active Problem List   Diagnosis     Nasal polyposis     Chronic sinusitis     Advanced directives, counseling/discussion     SCOTT (obstructive sleep apnea)-Mild (AHI 6)     Nevus of multiple sites of trunk     Hypothyroidism due to Hashimoto's thyroiditis     Dupuytren's contracture of both hands     Personal history of gastric ulcer     Nocturia associated with benign prostatic hyperplasia     Hypertension, goal below 140/90     Family hx of prostate cancer     Diabetes mellitus, type 2 (H)     Hyperlipidemia LDL goal <100     Past Medical History:   Diagnosis Date     Bleeding ulcer 2008    Duodenal      Chronic sinusitis      Seasonal allergies         CC No referring provider defined for this encounter. on close of this encounter.

## 2023-01-23 NOTE — LETTER
1/23/2023         RE: Robert Carolina  [address] [place] [address]        Dear Colleague,    Thank you for referring your patient, Robert Carolina, to the [facility]. Please see a copy of my visit note below.    [facility] Dermatology Note  Encounter Date: Jan 23, 2023  Office Visit     Dermatology Problem List:  1. 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 History of DN  - Compound dysplastic nevus with moderate atypia, R mid lateral back 5/27/22; Clinical observation.   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 AK s/p cryo     ____________________________________________     Assessment & Plan:    # History of dysplastic nevi, no clinical evidence of recurrence.  - ABCDEs: Counseled ABCDEs of melanoma: Asymmetry, Border (irregularity), Color (not uniform, changes in color), Diameter (greater than 6 mm which is about the size of a pencil eraser), and Evolving (any changes in preexisting moles).  - Sun protection: Counseled SPF30+ sunscreen, UPF clothing, sun avoidance, tanning bed avoidance.  - Recommended regular skin checks.     # Actinic keratosis - forehead. Based on the location and chronic irritation to this lesion, treatment with cryotherapy is warranted.   - See cryo procedure note.     # MArch 31st, recheck folliculitis, thighs buttocks hip to confirm resolved    Procedures Performed:   - Cryotherapy procedure note, location(s): forehead and scalp. After verbal consent and discussion of risks and benefits including, but not limited to, dyspigmentation/scar, blister, and pain, 7 lesion(s) was(were) treated with 1-2 mm freeze border for 1-2 cycles with liquid nitrogen. Post cryotherapy instructions were provided.         Follow-up:  Spot check as above, skin  check in 6 months    Staff and Scribe:     Scribe Disclosure:   I, Zachery Thornton, am serving as a scribe to document services personally performed by this physician, Dr. Alisha Blackwood, based on data collection and the provider's statements to me.     Provider Disclosure:   The documentation recorded by the scribe accurately reflects the services I personally performed and the decisions made by me.    Alisha Blackwood MD    Department of Dermatology  [facility]: Phone: [phone], Fax:[phone]  [facility]: Phone: [phone], Fax: [phone]    ____________________________________________    CC: Skin Check (Areas of concern: nose, bilateral sides, and left buttock. Hx of SCCIS)    HPI:  Mr. Robert Carolina is a(n) 67 year old male who presents today as a return patient for  Skin cancer    Last seen 7/25/22 by Dr. Ruff for a wound check.    Today,  Wants spots on face and also the arm and also the hip and buttocks checked.     Patient is otherwise feeling well, without additional skin concerns.    Labs Reviewed:  N/A    Physical Exam:  Vitals: There were no vitals taken for this visit.  SKIN: Total skin excluding the undergarment areas was performed. The exam included the head/face, neck, both arms, chest, back, abdomen, both legs, digits and/or nails.  Mary ibarra LPN  -There is/are  erythematous macules with overlying adherent scale on the forehead and temples   -well healed skin cancer scar  -perifollicular papules on the buttocks and hips  - No other lesions of concern on areas examined.     Medications:  Current Outpatient Medications   Medication     ALAVERT OR     clindamycin (CLEOCIN T) 1 % external lotion     COMPOUNDED NON-CONTROLLED SUBSTANCE (CMPD RX) - PHARMACY TO MIX COMPOUNDED MEDICATION     fluticasone (FLONASE) 50 MCG/ACT nasal spray     levothyroxine  (SYNTHROID/LEVOTHROID) 100 MCG tablet     metFORMIN (GLUCOPHAGE-XR) 500 MG 24 hr tablet     OCUVITE OR     omeprazole (PRILOSEC) 40 MG DR capsule     pravastatin (PRAVACHOL) 40 MG tablet     terazosin (HYTRIN) 5 MG capsule     TYLENOL 325 MG PO TABS     No current facility-administered medications for this visit.      Past Medical History:   Patient Active Problem List   Diagnosis     Nasal polyposis     Chronic sinusitis     Advanced directives, counseling/discussion     SCOTT (obstructive sleep apnea)-Mild (AHI 6)     Nevus of multiple sites of trunk     Hypothyroidism due to Hashimoto's thyroiditis     Dupuytren's contracture of both hands     Personal history of gastric ulcer     Nocturia associated with benign prostatic hyperplasia     Hypertension, goal below 140/90     Family hx of prostate cancer     Diabetes mellitus, type 2 (H)     Hyperlipidemia LDL goal <100     Past Medical History:   Diagnosis Date     Bleeding ulcer 2008    Duodenal      Chronic sinusitis      Seasonal allergies         CC No referring provider defined for this encounter. on close of this encounter.       Again, thank you for allowing me to participate in the care of your patient.        Sincerely,        Alisha Blackwood MD

## 2023-01-23 NOTE — PATIENT INSTRUCTIONS
Patient Education     Checking for Skin Cancer  You can find cancer early by checking your skin each month. There are 3 kinds of skin cancer. They are melanoma, basal cell carcinoma, and squamous cell carcinoma. Doing monthly skin checks is the best way to find new marks or skin changes. Follow the instructions below for checking your skin.   The ABCDEs of checking moles for melanoma   Check your moles or growths for signs of melanoma using ABCDE:   Asymmetry: the sides of the mole or growth don t match  Border: the edges are ragged, notched, or blurred  Color: the color within the mole or growth varies  Diameter: the mole or growth is larger than 6 mm (size of a pencil eraser)  Evolving: the size, shape, or color of the mole or growth is changing (evolving is not shown in the images below)    Checking for other types of skin cancer  Basal cell carcinoma or squamous cell carcinoma have symptoms such as:     A spot or mole that looks different from all other marks on your skin  Changes in how an area feels, such as itching, tenderness, or pain  Changes in the skin's surface, such as oozing, bleeding, or scaliness  A sore that does not heal  New swelling or redness beyond the border of a mole    Who s at risk?  Anyone can get skin cancer. But you are at greater risk if you have:   Fair skin, light-colored hair, or light-colored eyes  Many moles or abnormal moles on your skin  A history of sunburns from sunlight or tanning beds  A family history of skin cancer  A history of exposure to radiation or chemicals  A weakened immune system  If you have had skin cancer in the past, you are at risk for recurring skin cancer.   How to check your skin  Do your monthly skin checkups in front of a full-length mirror. Check all parts of your body, including your:   Head (ears, face, neck, and scalp)  Torso (front, back, and sides)  Arms (tops, undersides, upper, and lower armpits)  Hands (palms, backs, and fingers, including  under the nails)  Buttocks and genitals  Legs (front, back, and sides)  Feet (tops, soles, toes, including under the nails, and between toes)  If you have a lot of moles, take digital photos of them each month. Make sure to take photos both up close and from a distance. These can help you see if any moles change over time.   Most skin changes are not cancer. But if you see any changes in your skin, call your doctor right away. Only he or she can diagnose a problem. If you have skin cancer, seeing your doctor can be the first step toward getting the treatment that could save your life.   [employer] last reviewed this educational content on 4/1/2019 2000-2020 [employer] [facility]. [address] [place] [address], [place] [address]. All rights reserved. This information is not intended as a substitute for professional medical care. Always follow your healthcare professional's instructions.       When should I call my doctor?  If you are worsening or not improving, please, contact us or seek urgent care as noted below.     Who should I call with questions (adults)?  [facility] (adult and pediatric): [phone]  [facility] (adult): [phone]  For urgent needs outside of business hours call the [facility] at [phone] and ask for the dermatology resident on call to be paged  If this is a medical emergency and you are unable to reach an ER, Call [phone]    Who should I call with questions (pediatric)?  [facility]- Pediatric Dermatology  Dr. Idalia Tee, Dr. Allan Spring, Dr. Lesa Malagon, RAQUEL Pantoja, Dr. Eulalia Montes, Dr. Abimbola Barnes & Dr. Ashish Brannon  Non-urgent nurse triage line; [phone]- Ml and Margaret WHITTAKER Care Coordinatorruthie Hutson (/Complex ) [phone]    If you need a prescription refill, please contact your pharmacy. Refills are approved or denied by our  Physicians during normal business hours, Monday through Fridays  Per office policy, refills will not be granted if you have not been seen within the past year (or sooner depending on your child's condition)    Scheduling Information:  Pediatric Appointment Scheduling and Call Center [phone]  Radiology Scheduling- [phone]  Sedation Unit Scheduling- [phone]  [place] Scheduling- General [phone]; Pediatric Dermatology [phone]  Main  Services: [phone]  Malay: [phone]  Sao Tomean: [phone]  Hmong/Palauan/German: [phone]  Preadmission Nursing Department Fax Number: [phone] (Fax all pre-operative paperwork to this number)    For urgent matters arising during evenings, weekends, or holidays that cannot wait for normal business hours please call [phone] and ask for the dermatology resident on call to be paged.      Cryotherapy    What is it?  Use of a very cold liquid, such as liquid nitrogen, to freeze and destroy abnormal skin cells that need to be removed    What should I expect?  Tenderness and redness  A small blister that might grow and fill with dark purple blood. There may be crusting.  More than one treatment may be needed if the lesions do not go away.    How do I care for the treated area?  Gently wash the area with your hands when bathing.  Use a thin layer of Vaseline to help with healing. You may use a Band-Aid.   The area should heal within 7-10 days and may leave behind a pink or lighter color.   Do not use an antibiotic or Neosporin ointment.   You may take acetaminophen (Tylenol) for pain.     Call your doctor if you have:  Severe pain  Signs of infection (warmth, redness, cloudy yellow drainage, and or a bad smell)  Questions or concerns    Who should I call with questions?      [facility]: [phone]      [facility]: [phone]      For urgent needs outside of business  hours call the [facility] at [phone] and ask for the dermatology resident on call

## 2023-03-31 ENCOUNTER — OFFICE VISIT (OUTPATIENT)
Dept: DERMATOLOGY | Facility: CLINIC | Age: 68
End: 2023-03-31
Payer: COMMERCIAL

## 2023-03-31 DIAGNOSIS — L57.0 ACTINIC KERATOSES: Primary | ICD-10-CM

## 2023-03-31 PROCEDURE — 17003 DESTRUCT PREMALG LES 2-14: CPT | Performed by: DERMATOLOGY

## 2023-03-31 PROCEDURE — 17000 DESTRUCT PREMALG LESION: CPT | Performed by: DERMATOLOGY

## 2023-03-31 NOTE — PROGRESS NOTES
[facility] Dermatology Note  Encounter Date: Mar 31, 2023  Office Visit     Dermatology Problem List:  1. 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 History of DN  - Compound dysplastic nevus with moderate atypia, R mid lateral back 5/27/22; Clinical observation.   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 AK s/p cryo     ____________________________________________     Assessment & Plan:     # History of dysplastic nevi, no clinical evidence of recurrence.  - ABCDEs: Counseled ABCDEs of melanoma: Asymmetry, Border (irregularity), Color (not uniform, changes in color), Diameter (greater than 6 mm which is about the size of a pencil eraser), and Evolving (any changes in preexisting moles).  - Sun protection: Counseled SPF30+ sunscreen, UPF clothing, sun avoidance, tanning bed avoidance.  - Recommended regular skin checks.      # Actinic keratosis - scalp x 3. Based on the location and chronic irritation to this lesion, treatment with cryotherapy is warranted.   - See cryo procedure note.      # Folliculitis, RESOLVED.     Procedures Performed:   - Cryotherapy procedure note, location(s): scalp. After verbal consent and discussion of risks and benefits including, but not limited to, dyspigmentation/scar, blister, and pain, 3 AK(s) was(were) treated with 1-2 mm freeze border for 1-2 cycles with liquid nitrogen. Post cryotherapy instructions were provided.      Follow-up: 2-3 month(s) in-person for a skin check, or earlier for new or changing lesions    Staff and Scribe:     Scribe Disclosure:   IZachery, am serving as a scribe to document services personally performed by this physician, Dr. Alisha Blackwood, based on data collection and the provider's statements to me.     Provider Disclosure:   The  documentation recorded by the scribe accurately reflects the services I personally performed and the decisions made by me.    Alisha Blackwood MD    Department of Dermatology  [facility]: Phone: [phone], Fax:[phone]  [facility]: Phone: [phone], Fax: [phone]    ____________________________________________    CC: Skin Check (Patient here for a spot check, area of concern Left hip. )    HPI:  Mr. Rboert Carolina is a(n) 67 year old male who presents today as a return patient for a spot check.    Last seen 1/23/23 for a skin check. At that time, 7 AKs were treated with cryotherapy.     Today, he is concerned about a spot that he suspects is a scar.     Patient is otherwise feeling well, without additional skin concerns.    Labs Reviewed:  N/A    Physical Exam:  Vitals: There were no vitals taken for this visit.  SKIN: Focused examination of left hip, scalp was performed.  - Well healed scar on the left hip.  - There are erythematous macules with overyling adherent scale on the scalp x 3.  - No other lesions of concern on areas examined.     Medications:  Current Outpatient Medications   Medication     ALAVERT OR     clindamycin (CLEOCIN T) 1 % external lotion     COMPOUNDED NON-CONTROLLED SUBSTANCE (CMPD RX) - PHARMACY TO MIX COMPOUNDED MEDICATION     levothyroxine (SYNTHROID/LEVOTHROID) 100 MCG tablet     metFORMIN (GLUCOPHAGE-XR) 500 MG 24 hr tablet     OCUVITE OR     omeprazole (PRILOSEC) 40 MG DR capsule     pravastatin (PRAVACHOL) 40 MG tablet     terazosin (HYTRIN) 5 MG capsule     TYLENOL 325 MG PO TABS     fluticasone (FLONASE) 50 MCG/ACT nasal spray     No current facility-administered medications for this visit.      Past Medical History:   Patient Active Problem List   Diagnosis     Nasal polyposis     Chronic sinusitis     Advanced directives, counseling/discussion      SCOTT (obstructive sleep apnea)-Mild (AHI 6)     Nevus of multiple sites of trunk     Hypothyroidism due to Hashimoto's thyroiditis     Dupuytren's contracture of both hands     Personal history of gastric ulcer     Nocturia associated with benign prostatic hyperplasia     Hypertension, goal below 140/90     Family hx of prostate cancer     Diabetes mellitus, type 2 (H)     Hyperlipidemia LDL goal <100     Past Medical History:   Diagnosis Date     Bleeding ulcer 2008    Duodenal      Chronic sinusitis      Seasonal allergies         CC No referring provider defined for this encounter. on close of this encounter.

## 2023-03-31 NOTE — NURSING NOTE
Robert Carolina's goals for this visit include:   Chief Complaint   Patient presents with     Skin Check     Patient here for a spot check, area of concern Left hip.        He requests these members of his care team be copied on today's visit information:     PCP: No Ref-Primary, Physician    Referring Provider:  No referring provider defined for this encounter.    There were no vitals taken for this visit.    Do you need any medication refills at today's visit? No         Polina Ba EMT

## 2023-03-31 NOTE — LETTER
3/31/2023         RE: Robert Carolina  [address] [place] [address]        Dear Colleague,    Thank you for referring your patient, Robert Carolina, to the [facility]. Please see a copy of my visit note below.    [facility] Dermatology Note  Encounter Date: Mar 31, 2023  Office Visit     Dermatology Problem List:  1. 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 History of DN  - Compound dysplastic nevus with moderate atypia, R mid lateral back 5/27/22; Clinical observation.   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 AK s/p cryo     ____________________________________________     Assessment & Plan:     # History of dysplastic nevi, no clinical evidence of recurrence.  - ABCDEs: Counseled ABCDEs of melanoma: Asymmetry, Border (irregularity), Color (not uniform, changes in color), Diameter (greater than 6 mm which is about the size of a pencil eraser), and Evolving (any changes in preexisting moles).  - Sun protection: Counseled SPF30+ sunscreen, UPF clothing, sun avoidance, tanning bed avoidance.  - Recommended regular skin checks.      # Actinic keratosis - scalp x 3. Based on the location and chronic irritation to this lesion, treatment with cryotherapy is warranted.   - See cryo procedure note.      # Folliculitis, RESOLVED.     Procedures Performed:   - Cryotherapy procedure note, location(s): scalp. After verbal consent and discussion of risks and benefits including, but not limited to, dyspigmentation/scar, blister, and pain, 3 AK(s) was(were) treated with 1-2 mm freeze border for 1-2 cycles with liquid nitrogen. Post cryotherapy instructions were provided.      Follow-up: 2-3 month(s) in-person for a skin check, or earlier for new or changing lesions    Staff  and Scribe:     Scribe Disclosure:   I, Zachery Thornton, am serving as a scribe to document services personally performed by this physician, Dr. Alisha Blackwood, based on data collection and the provider's statements to me.     Provider Disclosure:   The documentation recorded by the scribe accurately reflects the services I personally performed and the decisions made by me.    Alisha Blackwood MD    Department of Dermatology  [facility]: Phone: [phone], Fax:[phone]  [facility]: Phone: [phone], Fax: [phone]    ____________________________________________    CC: Skin Check (Patient here for a spot check, area of concern Left hip. )    HPI:  Mr. Robert Carolina is a(n) 67 year old male who presents today as a return patient for a spot check.    Last seen 1/23/23 for a skin check. At that time, 7 AKs were treated with cryotherapy.     Today, he is concerned about a spot that he suspects is a scar.     Patient is otherwise feeling well, without additional skin concerns.    Labs Reviewed:  N/A    Physical Exam:  Vitals: There were no vitals taken for this visit.  SKIN: Focused examination of left hip, scalp was performed.  - Well healed scar on the left hip.  - There are erythematous macules with overyling adherent scale on the scalp x 3.  - No other lesions of concern on areas examined.     Medications:  Current Outpatient Medications   Medication     ALAVERT OR     clindamycin (CLEOCIN T) 1 % external lotion     COMPOUNDED NON-CONTROLLED SUBSTANCE (CMPD RX) - PHARMACY TO MIX COMPOUNDED MEDICATION     levothyroxine (SYNTHROID/LEVOTHROID) 100 MCG tablet     metFORMIN (GLUCOPHAGE-XR) 500 MG 24 hr tablet     OCUVITE OR     omeprazole (PRILOSEC) 40 MG DR capsule     pravastatin (PRAVACHOL) 40 MG tablet     terazosin (HYTRIN) 5 MG capsule     TYLENOL 325 MG PO TABS     fluticasone  (FLONASE) 50 MCG/ACT nasal spray     No current facility-administered medications for this visit.      Past Medical History:   Patient Active Problem List   Diagnosis     Nasal polyposis     Chronic sinusitis     Advanced directives, counseling/discussion     SCOTT (obstructive sleep apnea)-Mild (AHI 6)     Nevus of multiple sites of trunk     Hypothyroidism due to Hashimoto's thyroiditis     Dupuytren's contracture of both hands     Personal history of gastric ulcer     Nocturia associated with benign prostatic hyperplasia     Hypertension, goal below 140/90     Family hx of prostate cancer     Diabetes mellitus, type 2 (H)     Hyperlipidemia LDL goal <100     Past Medical History:   Diagnosis Date     Bleeding ulcer 2008    Duodenal      Chronic sinusitis      Seasonal allergies         CC No referring provider defined for this encounter. on close of this encounter.     Again, thank you for allowing me to participate in the care of your patient.        Sincerely,        Alisha Blackwood MD

## 2023-04-23 ENCOUNTER — HEALTH MAINTENANCE LETTER (OUTPATIENT)
Age: 68
End: 2023-04-23

## 2023-07-16 ENCOUNTER — HEALTH MAINTENANCE LETTER (OUTPATIENT)
Age: 68
End: 2023-07-16

## 2023-09-07 RX ORDER — CLINDAMYCIN HCL 300 MG
300 CAPSULE ORAL 3 TIMES DAILY
Qty: 63 CAPSULE | Refills: 1 | Status: SHIPPED | OUTPATIENT
Start: 2023-09-07 | End: 2023-10-20

## 2023-09-24 ENCOUNTER — HEALTH MAINTENANCE LETTER (OUTPATIENT)
Age: 68
End: 2023-09-24

## 2023-09-27 NOTE — PROGRESS NOTES
History of Present Illness - Robert Carolina is a very pleasant 68 year old male last seen on 9/19/2022    He is status post functinoal endoscopic sinus surgery for polyposis on 7/21/2011.    To review the year 2013, on exam at the 6/10/2013 visit, things looked good, with some residual disease on the LEFT.  I had him continue the rinses and at the visit on 9/16/13 visit there was still some polypoid disease on the LEFT side.    At the 12/16/13 visit things looked the best I had seen in a year, and I asked him to continue the same irrigation regimen, and also discussed adding surfactant.  He did not try the surfactant at that point, and tells me that unfortunately things had taken a slight turn for the worse at the beginning of 2014.  He could tell because he feels more congested and has a bit of a sore throat from increased post nasal drainage.  And at the 3/17/2014 visit there was clearly endoscopic evidence of purulent disease on the RIGHT side.  I added itraconazole to his Gent and Dex regimen, and also treated with 14 days of bactrim at the 3/17/14 visit.    At the 12/15/14 visit he told me that the only change he had made was adding a drop of baby shampoo to his sinus saline irrigations, and he is happy to report that he thinks it is helping.  His nose is still feeling great, much more open and clear, and there has been no facial pressure.    At the 6/23/2014 visit endoscopy showed his nose looked the best it had in years.    At 12/15/14 visit and the 4/13/15 visit, no changes, still using the rinses with baby shampoo in a saline, as well as medicated rinses 4x/Week.  Unfortunately at the 10/12/15 visit, there was a lot of purulent drainage seen on the LEFT.  SO we doubled the use of the compound irrigations and that got things under control.  So overall in 2015 and 2016, he has maintained himself on really nothing more than saline NeilMed irrigations with a bit of baby shampoo.    At the 8/22/17 visit, he  also had issues with a chronic tickling dry cough.  It seemed to be laryngopharyngeal reflux< so I started reflux medication and he is also here to follow up on that issue as well.  He tells me that the reflux medicaiton didn't seem to help, so he is trying dietary modifications.    At the 8//22/2017 exam, I did find a small early polyp on the LEFT side.  We continued medications, but he did end up having a full blown sinusitis in October.  Since that time, and also at the visit on 12/12/17, there was persistent polyp growth on the LEFT, and then again on the RIGHT.  Treatment with medicated irrigations, but there was still recurrent polyposis, and so at the end of the 12/12 visit I added Budesonide in NeilMed irrigations.      But at this point, he was only on Gent Dex irrigations daily, but his nose is still a problem on occasion with thick and occasionally bloody drainage.  But at the April 2021 visit, he also had a new issue of a submental neck mass.  CT was done and showed what was most consistent with reactive node, no other masses. But given the size, I did request a needle biopsy done, but as far as I can tell that was not done.    At the follow up on 1/27/22 he told me that about a month prior he started having congestion and bloody crusting, so he took an old prescription of Biaxin about 2-3 weeks ago.  He tells me that it helped, but not completely.  He has been on Gent Dex irrigations daily, as well as saline rinses with baby shampoo.    ON exam at that visit, there was hemalatha purulent crusts, LEFT more than RIGHT and I cultured.  Based on those results I changed him to a Clindamycin nasal irrigation and he is here for follow up.  I have not done a new CT sinus since early 2021.    At the 3/28/2022 visit, endoscopic exam finally showed resolution.  I asked him to just use the clinda rinses for long term maintenance and he is here for follow up.    Since seeing me 6 months ago, he did have a sinus  infection and was treated with oral antibiotics but it did not seem to clear up. He is still on his long term saline with baby shampoo, and the clindamycin irrigations.  Since he last saw me in 2022 he has overall done well but this last infection has been very persistent.    Past Medical History -   Patient Active Problem List   Diagnosis    Nasal polyposis    Chronic sinusitis    Advanced directives, counseling/discussion    SCOTT (obstructive sleep apnea)-Mild (AHI 6)    Nevus of multiple sites of trunk    Hypothyroidism due to Hashimoto's thyroiditis    Dupuytren's contracture of both hands    Personal history of gastric ulcer    Nocturia associated with benign prostatic hyperplasia    Hypertension, goal below 140/90    Family hx of prostate cancer    Diabetes mellitus, type 2 (H)    Hyperlipidemia LDL goal <100       Current Medications -   Current Outpatient Medications:     ALAVERT OR, once daily, Disp: , Rfl:     clindamycin (CLEOCIN T) 1 % external lotion, Apply once daily to chest, back, thighs, and buttocks., Disp: 60 mL, Rfl: 1    clindamycin (CLEOCIN) 300 MG capsule, Take 1 capsule (300 mg) by mouth 3 times daily, Disp: 63 capsule, Rfl: 1    COMPOUNDED NON-CONTROLLED SUBSTANCE (CMPD RX) - PHARMACY TO MIX COMPOUNDED MEDICATION, Apply 20 mLs into each nare 2 times daily Vancomycin 1000 micrograms/mL saline for nasal irrigation, Disp: 2000 mL, Rfl: 6    fluticasone (FLONASE) 50 MCG/ACT nasal spray, SHAKE LIQUID AND USE 2 SPRAYS IN EACH NOSTRIL DAILY (Patient not taking: Reported on 3/31/2023), Disp: 48 g, Rfl: 1    levothyroxine (SYNTHROID/LEVOTHROID) 100 MCG tablet, TAKE 1 TABLET(100 MCG) BY MOUTH DAILY, Disp: 90 tablet, Rfl: 3    metFORMIN (GLUCOPHAGE-XR) 500 MG 24 hr tablet, Take 1 tablet (500 mg) by mouth daily (with dinner), Disp: 90 tablet, Rfl: 3    OCUVITE OR, once daily, Disp: , Rfl:     omeprazole (PRILOSEC) 40 MG DR capsule, Take 1 capsule (40 mg) by mouth daily, Disp: 90 capsule, Rfl: 3     pravastatin (PRAVACHOL) 40 MG tablet, Take 1 tablet (40 mg) by mouth daily Please follow up in clinic for next refill., Disp: 90 tablet, Rfl: 0    terazosin (HYTRIN) 5 MG capsule, Take 1 capsule (5 mg) by mouth At Bedtime, Disp: 90 capsule, Rfl: 1    TYLENOL 325 MG PO TABS, 4 time per week, Disp: , Rfl:     Allergies -   Allergies   Allergen Reactions    Ciprofloxacin Other (See Comments)     Leg pain    Carbaphen Nausea    Ceftin GI Disturbance    Cefuroxime Nausea     Other reaction(s): Stomach Upset  Other reaction(s): Gastrointestinal  Other reaction(s): Stomach Upset  Other reaction(s): Stomach Upset    No Clinical Screening - See Comments Rash    Oxycodone-Acetaminophen Rash       Social History -   Social History     Socioeconomic History    Marital status:      Spouse name: Not on file    Number of children: Not on file    Years of education: Not on file    Highest education level: Not on file   Occupational History    Not on file   Social Needs    Financial resource strain: Not on file    Food insecurity     Worry: Not on file     Inability: Not on file    Transportation needs     Medical: Not on file     Non-medical: Not on file   Tobacco Use    Smoking status: Never Smoker    Smokeless tobacco: Never Used   Substance and Sexual Activity    Alcohol use: Yes     Comment: 5 per week    Drug use: No    Sexual activity: Yes     Partners: Female     Birth control/protection: None   Lifestyle    Physical activity     Days per week: Not on file     Minutes per session: Not on file    Stress: Not on file   Relationships    Social connections     Talks on phone: Not on file     Gets together: Not on file     Attends Christianity service: Not on file     Active member of club or organization: Not on file     Attends meetings of clubs or organizations: Not on file     Relationship status: Not on file    Intimate partner violence     Fear of current or ex partner: Not on file     Emotionally abused: Not on file      Physically abused: Not on file     Forced sexual activity: Not on file   Other Topics Concern    Parent/sibling w/ CABG, MI or angioplasty before 65F 55M? Not Asked   Social History Narrative    Not on file       Family History -   Family History   Problem Relation Age of Onset    Diabetes Mother     Skin Cancer Father     Prostate Cancer Brother     Melanoma No family hx of        Review of Systems - As per HPI and PMHx, otherwise 10+ system review of the head and neck, and general constitution is negative.    Physical Exam  BP (!) 170/74   Pulse 61   Wt 73 kg (161 lb)   SpO2 98%   BMI 23.43 kg/m      General - The patient is well nourished and well developed, and appears to have good nutritional status.  Alert and oriented to person and place, answers questions and cooperates with examination appropriately.   Head and Face - Normocephalic and atraumatic, with no gross asymmetry noted of the contour of the facial features.  The facial nerve is intact, with strong symmetric movements.  Voice and Breathing - The patient was breathing comfortably without the use of accessory muscles. There was no wheezing, stridor, or stertor.  The patients voice was clear and strong, and had appropriate pitch and quality.  Ears - The tympanic membranes are normal in appearance, bony landmarks are intact.  No retraction, perforation, or masses.  No fluid or purulence was seen in the external canal or the middle ear. No evidence of infection of the middle ear or external canal, cerumen was normal in appearance.  Eyes - Extraocular movements intact, and the pupils were reactive to light.  Sclera were not icteric or injected, conjunctiva were pink and moist.      PROCEDURE  To evaluate the nose in the postoperative state I performed rigid nasal endoscopy.  I first sprayed with lidocaine and neosynephrine.  I began with the LEFT side using a 2.7mm 30 degree rigid nasal endoscope, and color photographs were taken for the medical  record.    The middle meatus was open, and I was able to pass the scope through.  The LEFT maxillary antrostomy is open and there was a clearly visible purulent stream of drainage that I cultured directly..  Going further back, the ethmoid roof is nicely re-mucosalized, and there is no abnormal secretions or polypoid degeneration.    The right side was then examined.  The middle meatus was open and I visualized the RIGHT antrostomy was open. Howveer today, the LEFT ethmoid roof was completely filled with small polyps.  The mucosa is healthy, and there were polyps or polypoid degenerations.              A/P - Robert Carolina is a 65 year old male  (J32.4) Chronic pansinusitis  (primary encounter diagnosis)  (J33.9) Nasal polyposis    I have cultured, and will base our treatment on the results. But if this fails, based on the visualized worsening of polyposis, I think we should consider revision surgery, and perhaps even allergy referral to see if he would benefit from biologicals    Until the cultures come back, I have started him on Biaxin

## 2023-10-09 ENCOUNTER — OFFICE VISIT (OUTPATIENT)
Dept: OTOLARYNGOLOGY | Facility: CLINIC | Age: 68
End: 2023-10-09
Payer: COMMERCIAL

## 2023-10-09 VITALS
OXYGEN SATURATION: 98 % | WEIGHT: 161 LBS | BODY MASS INDEX: 23.43 KG/M2 | HEART RATE: 61 BPM | DIASTOLIC BLOOD PRESSURE: 74 MMHG | SYSTOLIC BLOOD PRESSURE: 170 MMHG

## 2023-10-09 DIAGNOSIS — R05.3 CHRONIC COUGH: ICD-10-CM

## 2023-10-09 DIAGNOSIS — J32.4 CHRONIC PANSINUSITIS: Primary | ICD-10-CM

## 2023-10-09 DIAGNOSIS — J33.9 NASAL POLYPOSIS: ICD-10-CM

## 2023-10-09 PROCEDURE — 87102 FUNGUS ISOLATION CULTURE: CPT | Performed by: OTOLARYNGOLOGY

## 2023-10-09 PROCEDURE — 87107 FUNGI IDENTIFICATION MOLD: CPT | Mod: 91 | Performed by: OTOLARYNGOLOGY

## 2023-10-09 PROCEDURE — 99214 OFFICE O/P EST MOD 30 MIN: CPT | Mod: 25 | Performed by: OTOLARYNGOLOGY

## 2023-10-09 PROCEDURE — 87107 FUNGI IDENTIFICATION MOLD: CPT | Performed by: OTOLARYNGOLOGY

## 2023-10-09 PROCEDURE — 87077 CULTURE AEROBIC IDENTIFY: CPT | Performed by: OTOLARYNGOLOGY

## 2023-10-09 PROCEDURE — 87186 SC STD MICRODIL/AGAR DIL: CPT | Performed by: OTOLARYNGOLOGY

## 2023-10-09 PROCEDURE — 87075 CULTR BACTERIA EXCEPT BLOOD: CPT | Performed by: OTOLARYNGOLOGY

## 2023-10-09 PROCEDURE — 87070 CULTURE OTHR SPECIMN AEROBIC: CPT | Performed by: OTOLARYNGOLOGY

## 2023-10-09 PROCEDURE — 31231 NASAL ENDOSCOPY DX: CPT | Performed by: OTOLARYNGOLOGY

## 2023-10-09 RX ORDER — CLARITHROMYCIN 500 MG
500 TABLET ORAL 2 TIMES DAILY
Qty: 20 TABLET | Refills: 1 | Status: SHIPPED | OUTPATIENT
Start: 2023-10-09 | End: 2023-10-19

## 2023-10-09 NOTE — LETTER
10/9/2023         RE: Robert Carolina  [id number] [place] [address]        Dear Colleague,    Thank you for referring your patient, Robert Carolina, to the [facility]. Please see a copy of my visit note below.    History of Present Illness - Robert Carolina is a very pleasant 68 year old male last seen on 9/19/2022    He is status post functinoal endoscopic sinus surgery for polyposis on 7/21/2011.    To review the year 2013, on exam at the 6/10/2013 visit, things looked good, with some residual disease on the LEFT.  I had him continue the rinses and at the visit on 9/16/13 visit there was still some polypoid disease on the LEFT side.    At the 12/16/13 visit things looked the best I had seen in a year, and I asked him to continue the same irrigation regimen, and also discussed adding surfactant.  He did not try the surfactant at that point, and tells me that unfortunately things had taken a slight turn for the worse at the beginning of 2014.  He could tell because he feels more congested and has a bit of a sore throat from increased post nasal drainage.  And at the 3/17/2014 visit there was clearly endoscopic evidence of purulent disease on the RIGHT side.  I added itraconazole to his Gent and Dex regimen, and also treated with 14 days of bactrim at the 3/17/14 visit.    At the 12/15/14 visit he told me that the only change he had made was adding a drop of baby shampoo to his sinus saline irrigations, and he is happy to report that he thinks it is helping.  His nose is still feeling great, much more open and clear, and there has been no facial pressure.    At the 6/23/2014 visit endoscopy showed his nose looked the best it had in years.    At 12/15/14 visit and the 4/13/15 visit, no changes, still using the rinses with baby shampoo in a saline, as well as medicated rinses 4x/Week.  Unfortunately at the 10/12/15 visit, there was a lot of purulent drainage seen on the LEFT.   SO we doubled the use of the compound irrigations and that got things under control.  So overall in 2015 and 2016, he has maintained himself on really nothing more than saline NeilMed irrigations with a bit of baby shampoo.    At the 8/22/17 visit, he also had issues with a chronic tickling dry cough.  It seemed to be laryngopharyngeal reflux< so I started reflux medication and he is also here to follow up on that issue as well.  He tells me that the reflux medicaiton didn't seem to help, so he is trying dietary modifications.    At the 8//22/2017 exam, I did find a small early polyp on the LEFT side.  We continued medications, but he did end up having a full blown sinusitis in October.  Since that time, and also at the visit on 12/12/17, there was persistent polyp growth on the LEFT, and then again on the RIGHT.  Treatment with medicated irrigations, but there was still recurrent polyposis, and so at the end of the 12/12 visit I added Budesonide in NeilMed irrigations.      But at this point, he was only on Gent Dex irrigations daily, but his nose is still a problem on occasion with thick and occasionally bloody drainage.  But at the April 2021 visit, he also had a new issue of a submental neck mass.  CT was done and showed what was most consistent with reactive node, no other masses. But given the size, I did request a needle biopsy done, but as far as I can tell that was not done.    At the follow up on 1/27/22 he told me that about a month prior he started having congestion and bloody crusting, so he took an old prescription of Biaxin about 2-3 weeks ago.  He tells me that it helped, but not completely.  He has been on Gent Dex irrigations daily, as well as saline rinses with baby shampoo.    ON exam at that visit, there was hemalatha purulent crusts, LEFT more than RIGHT and I cultured.  Based on those results I changed him to a Clindamycin nasal irrigation and he is here for follow up.  I have not done a new CT  sinus since early 2021.    At the 3/28/2022 visit, endoscopic exam finally showed resolution.  I asked him to just use the clinda rinses for long term maintenance and he is here for follow up.    Since seeing me 6 months ago, he did have a sinus infection and was treated with oral antibiotics but it did not seem to clear up. He is still on his long term saline with baby shampoo, and the clindamycin irrigations.  Since he last saw me in 2022 he has overall done well but this last infection has been very persistent.    Past Medical History -   Patient Active Problem List   Diagnosis     Nasal polyposis     Chronic sinusitis     Advanced directives, counseling/discussion     SCOTT (obstructive sleep apnea)-Mild (AHI 6)     Nevus of multiple sites of trunk     Hypothyroidism due to Hashimoto's thyroiditis     Dupuytren's contracture of both hands     Personal history of gastric ulcer     Nocturia associated with benign prostatic hyperplasia     Hypertension, goal below 140/90     Family hx of prostate cancer     Diabetes mellitus, type 2 (H)     Hyperlipidemia LDL goal <100       Current Medications -   Current Outpatient Medications:      ALAVERT OR, once daily, Disp: , Rfl:      clindamycin (CLEOCIN T) 1 % external lotion, Apply once daily to chest, back, thighs, and buttocks., Disp: 60 mL, Rfl: 1     clindamycin (CLEOCIN) 300 MG capsule, Take 1 capsule (300 mg) by mouth 3 times daily, Disp: 63 capsule, Rfl: 1     COMPOUNDED NON-CONTROLLED SUBSTANCE (CMPD RX) - PHARMACY TO MIX COMPOUNDED MEDICATION, Apply 20 mLs into each nare 2 times daily Vancomycin 1000 micrograms/mL saline for nasal irrigation, Disp: 2000 mL, Rfl: 6     fluticasone (FLONASE) 50 MCG/ACT nasal spray, SHAKE LIQUID AND USE 2 SPRAYS IN EACH NOSTRIL DAILY (Patient not taking: Reported on 3/31/2023), Disp: 48 g, Rfl: 1     levothyroxine (SYNTHROID/LEVOTHROID) 100 MCG tablet, TAKE 1 TABLET(100 MCG) BY MOUTH DAILY, Disp: 90 tablet, Rfl: 3     metFORMIN  (GLUCOPHAGE-XR) 500 MG 24 hr tablet, Take 1 tablet (500 mg) by mouth daily (with dinner), Disp: 90 tablet, Rfl: 3     OCUVITE OR, once daily, Disp: , Rfl:      omeprazole (PRILOSEC) 40 MG DR capsule, Take 1 capsule (40 mg) by mouth daily, Disp: 90 capsule, Rfl: 3     pravastatin (PRAVACHOL) 40 MG tablet, Take 1 tablet (40 mg) by mouth daily Please follow up in clinic for next refill., Disp: 90 tablet, Rfl: 0     terazosin (HYTRIN) 5 MG capsule, Take 1 capsule (5 mg) by mouth At Bedtime, Disp: 90 capsule, Rfl: 1     TYLENOL 325 MG PO TABS, 4 time per week, Disp: , Rfl:     Allergies -   Allergies   Allergen Reactions     Ciprofloxacin Other (See Comments)     Leg pain     Carbaphen Nausea     Ceftin GI Disturbance     Cefuroxime Nausea     Other reaction(s): Stomach Upset  Other reaction(s): Gastrointestinal  Other reaction(s): Stomach Upset  Other reaction(s): Stomach Upset     No Clinical Screening - See Comments Rash     Oxycodone-Acetaminophen Rash       Social History -   Social History     Socioeconomic History     Marital status:      Spouse name: Not on file     Number of children: Not on file     Years of education: Not on file     Highest education level: Not on file   Occupational History     Not on file   Social Needs     Financial resource strain: Not on file     Food insecurity     Worry: Not on file     Inability: Not on file     Transportation needs     Medical: Not on file     Non-medical: Not on file   Tobacco Use     Smoking status: Never Smoker     Smokeless tobacco: Never Used   Substance and Sexual Activity     Alcohol use: Yes     Comment: 5 per week     Drug use: No     Sexual activity: Yes     Partners: Female     Birth control/protection: None   Lifestyle     Physical activity     Days per week: Not on file     Minutes per session: Not on file     Stress: Not on file   Relationships     Social connections     Talks on phone: Not on file     Gets together: Not on file     Attends  Christian service: Not on file     Active member of club or organization: Not on file     Attends meetings of clubs or organizations: Not on file     Relationship status: Not on file     Intimate partner violence     Fear of current or ex partner: Not on file     Emotionally abused: Not on file     Physically abused: Not on file     Forced sexual activity: Not on file   Other Topics Concern     Parent/sibling w/ CABG, MI or angioplasty before 65F 55M? Not Asked   Social History Narrative     Not on file       Family History -   Family History   Problem Relation Age of Onset     Diabetes Mother      Skin Cancer Father      Prostate Cancer Brother      Melanoma No family hx of        Review of Systems - As per HPI and PMHx, otherwise 10+ system review of the head and neck, and general constitution is negative.    Physical Exam  BP (!) 170/74   Pulse 61   Wt 73 kg (161 lb)   SpO2 98%   BMI 23.43 kg/m      General - The patient is well nourished and well developed, and appears to have good nutritional status.  Alert and oriented to person and place, answers questions and cooperates with examination appropriately.   Head and Face - Normocephalic and atraumatic, with no gross asymmetry noted of the contour of the facial features.  The facial nerve is intact, with strong symmetric movements.  Voice and Breathing - The patient was breathing comfortably without the use of accessory muscles. There was no wheezing, stridor, or stertor.  The patients voice was clear and strong, and had appropriate pitch and quality.  Ears - The tympanic membranes are normal in appearance, bony landmarks are intact.  No retraction, perforation, or masses.  No fluid or purulence was seen in the external canal or the middle ear. No evidence of infection of the middle ear or external canal, cerumen was normal in appearance.  Eyes - Extraocular movements intact, and the pupils were reactive to light.  Sclera were not icteric or injected,  conjunctiva were pink and moist.      PROCEDURE  To evaluate the nose in the postoperative state I performed rigid nasal endoscopy.  I first sprayed with lidocaine and neosynephrine.  I began with the LEFT side using a 2.7mm 30 degree rigid nasal endoscope, and color photographs were taken for the medical record.    The middle meatus was open, and I was able to pass the scope through.  The LEFT maxillary antrostomy is open and there was a clearly visible purulent stream of drainage that I cultured directly..  Going further back, the ethmoid roof is nicely re-mucosalized, and there is no abnormal secretions or polypoid degeneration.    The right side was then examined.  The middle meatus was open and I visualized the RIGHT antrostomy was open. Howveer today, the LEFT ethmoid roof was completely filled with small polyps.  The mucosa is healthy, and there were polyps or polypoid degenerations.              A/P - Robert Carolina is a 65 year old male  (J32.4) Chronic pansinusitis  (primary encounter diagnosis)  (J33.9) Nasal polyposis    I have cultured, and will base our treatment on the results. But if this fails, based on the visualized worsening of polyposis, I think we should consider revision surgery, and perhaps even allergy referral to see if he would benefit from biologicals    Until the cultures come back, I have started him on Biaxin      Again, thank you for allowing me to participate in the care of your patient.        Sincerely,        Colton Wood MD

## 2023-10-10 RX ORDER — OMEPRAZOLE 40 MG/1
40 CAPSULE, DELAYED RELEASE ORAL DAILY
Qty: 90 CAPSULE | Refills: 3 | Status: SHIPPED | OUTPATIENT
Start: 2023-10-10 | End: 2024-05-17

## 2023-10-11 NOTE — RESULT ENCOUNTER NOTE
Preliminary results noted, this would explain the persistence of the sinusitis.  I will await full antibiogram for treatment planning

## 2023-10-13 DIAGNOSIS — B49 NONINVASIVE FUNGAL SINUSITIS: Primary | ICD-10-CM

## 2023-10-13 DIAGNOSIS — J32.9 NONINVASIVE FUNGAL SINUSITIS: Primary | ICD-10-CM

## 2023-10-13 LAB
BACTERIA SPEC CULT: ABNORMAL

## 2023-10-13 NOTE — RESULT ENCOUNTER NOTE
Called Adriano.  I will add Amphotericin B rinses for a month, continue Vanco rinses twice daily, and finish Biaxin.  Update me on MyChart in one mnoth

## 2023-10-16 LAB — BACTERIA SPEC CULT: NORMAL

## 2023-10-20 ENCOUNTER — OFFICE VISIT (OUTPATIENT)
Dept: DERMATOLOGY | Facility: CLINIC | Age: 68
End: 2023-10-20
Payer: COMMERCIAL

## 2023-10-20 DIAGNOSIS — L57.0 AK (ACTINIC KERATOSIS): ICD-10-CM

## 2023-10-20 DIAGNOSIS — Z85.828 HISTORY OF NONMELANOMA SKIN CANCER: Primary | ICD-10-CM

## 2023-10-20 DIAGNOSIS — L82.1 SK (SEBORRHEIC KERATOSIS): ICD-10-CM

## 2023-10-20 DIAGNOSIS — J32.4 CHRONIC PANSINUSITIS: ICD-10-CM

## 2023-10-20 PROCEDURE — 17000 DESTRUCT PREMALG LESION: CPT | Performed by: DERMATOLOGY

## 2023-10-20 PROCEDURE — 99213 OFFICE O/P EST LOW 20 MIN: CPT | Mod: 25 | Performed by: DERMATOLOGY

## 2023-10-20 PROCEDURE — 17003 DESTRUCT PREMALG LES 2-14: CPT | Performed by: DERMATOLOGY

## 2023-10-20 RX ORDER — CLINDAMYCIN HCL 300 MG
300 CAPSULE ORAL 3 TIMES DAILY
Qty: 63 CAPSULE | Refills: 1 | Status: SHIPPED | OUTPATIENT
Start: 2023-10-20 | End: 2024-05-17

## 2023-10-20 NOTE — PATIENT INSTRUCTIONS
Checking for Skin Cancer  You can help find cancer early by checking your skin each month. There are 3 main kinds of skin cancer: melanoma, basal cell carcinoma, and squamous cell carcinoma. Doing monthly skin checks is the best way to find new marks, sores, or skin changes. Follow these instructions for checking your skin.   The ABCDEs of checking moles for melanoma   Check your moles or growths for signs of melanoma using ABCDE:   Asymmetry: The sides of the mole or growth don t match.  Border: The edges are ragged, notched, or blurred.  Color: The color within the mole or growth varies. It could be black, brown, tan, white, or shades of red, gray, or blue.  Diameter: The mole or growth is larger than   inch or 6 mm (size of a pencil eraser).  Evolving: The size, shape, texture, or color of the mole or growth is changing.     ABCDE's of moles on light skin.        ABCDE's of moles on dark skin may be harder to identify.     Checking for other types of skin cancer  Basal cell carcinoma or squamous cell carcinoma cause symptoms like:     A spot or mole that looks different from all other marks on your skin  Changes in how an area feels, such as itching, tenderness, or pain  Changes in the skin's surface, such as oozing, bleeding, or scaliness  A sore that doesn't heal  New swelling, redness, or spread of color beyond the border of a mole    Who s at risk?  Anyone of any skin color can get skin cancer. But you're at greater risk if you have:   Fair skin that freckles easily and burns instead of tanning  Light-colored or red hair  Light-colored eyes  Many moles or abnormal moles on your skin  A long history of unprotected exposure to sunlight or tanning beds  A history of many blistering sunburns as a child or teen  A family history of skin cancer  Been exposed to radiation or chemicals  A weakened immune system  Been exposed to arsenic  If you've had skin cancer in the past, you're at high risk of having it  again.   How to check your skin  Do your monthly skin checkups in front of a full-length mirror. Use a room with good lighting so it's easier to see. Use a hand mirror to look at hard-to-see places like your buttocks and back. You can also have a trusted friend or family member help you with these checks. Check every part of your body, including your:   Head (ears, face, neck, and scalp)  Torso (front, back, sides, and under breasts)  Arms (tops, undersides, and armpits)  Hands (palms, backs, and fingers, including under the nails)  Lower back, buttocks, and genitals  Legs (front, back, and sides)  Feet (tops, soles, toes, including under the nails, and between toes)  Watch for new spots on your skin or a spot that's changing in color, shape, size.   If you have a lot of moles, take digital photos of them each month. Make sure to take photos both up close and from a distance. These can help you see if any moles change over time.   Know your skin  Most skin changes aren't cancer. But if you see any changes in your skin, call your healthcare provider right away. Only they can tell you if a change is a problem. If you have skin cancer, seeing your provider can be the first step to getting the treatment that could save your life.   Skip last reviewed this educational content on 10/1/2021    0631-8579 [employer]. All rights reserved. This information is not intended as a substitute for professional medical care. Always follow your healthcare professional's instructions.    Cryotherapy    What is it?  Use of a very cold liquid, such as liquid nitrogen, to freeze and destroy abnormal skin cells that need to be removed    What should I expect?  Tenderness and redness  A small blister that might grow and fill with dark purple blood. There may be crusting.  More than one treatment may be needed if the lesions do not go away.    How do I care for the treated area?  Gently wash the area with your hands when  bathing.  Use a thin layer of Vaseline to help with healing. You may use a Band-Aid.   The area should heal within 7-10 days and may leave behind a pink or lighter color.   Do not use an antibiotic or Neosporin ointment.   You may take acetaminophen (Tylenol) for pain.     Call your doctor if you have:  Severe pain  Signs of infection (warmth, redness, cloudy yellow drainage, and or a bad smell)  Questions or concerns    Who should I call with questions?      [facility]: [phone]      [facility]: [phone]      For urgent needs outside of business hours call the [facility] at [phone] and ask for the dermatology resident on call

## 2023-10-20 NOTE — NURSING NOTE
Robert Carolina's goals for this visit include:   Chief Complaint   Patient presents with    Skin Check     Patient here for a skin check, areas of concern L wrist,        He requests these members of his care team be copied on today's visit information:     PCP: No Ref-Primary, Physician    Referring Provider:  No referring provider defined for this encounter.    There were no vitals taken for this visit.    Do you need any medication refills at today's visit?       Polina Ba EMT

## 2023-10-20 NOTE — LETTER
10/20/2023         RE: Robert Carolina  [id number] [place] [address]        Dear Colleague,    Thank you for referring your patient, Robert Carolina, to the [facility]. Please see a copy of my visit note below.    [facility] Dermatology Note  Encounter Date: Oct 20, 2023  Office Visit     Dermatology Problem List:  Last skin check 10/20/23, recommend yearly    1. 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 History of DN  - Compound dysplastic nevus with moderate atypia, R mid lateral back 5/27/22; Clinical observation.   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 AK   - scalp, s/p cryo 3/31/23  4. Folliculitis (resolved)    ____________________________________________    Assessment & Plan:    # H/O NMSC. No clinical evidence of recurrence.  # History of dysplastic nevi.   - ABCDEs: Counseled ABCDEs of melanoma: Asymmetry, Border (irregularity), Color (not uniform, changes in color), Diameter (greater than 6 mm which is about the size of a pencil eraser), and Evolving (any changes in preexisting moles).  - Sun protection: Counseled SPF30+ sunscreen, UPF clothing, sun avoidance, tanning bed avoidance.  - Recommended regular skin checks.      # SK, non-irritated, L wrist. recheck at 6 months  - Monitor: Patient to continue monitoring at home and will contact the clinic for any changes.       # Actinic keratosis x 5, crown.  - Cryotherapy performed today (see procedure note below).      Procedures Performed:   - Cryotherapy procedure note, location(s): crown. After verbal consent and discussion of risks and benefits including, but not limited to, dyspigmentation/scar, blister, and pain, 5 lesion(s) was(were) treated with 1-2 mm freeze border for 1-2 cycles with liquid  nitrogen. Post cryotherapy instructions were provided.    Follow-up: 6 months  skin check, or earlier for new or changing lesions    Staff and Scribe:     Scribe Disclosure:   I, Ana Palomino, am serving as a scribe to document services personally performed by Alisha Blackwood MD based on data collection and the provider's statements to me.     Scribe Disclosure:   I, Bonny Ortiz, am serving as a scribe to document services personally performed by Alisha Blackwood MD based on data collection and the provider's statements to me.     Provider Disclosure:   The documentation recorded by the scribe accurately reflects the services I personally performed and the decisions made by me.    Alisha Blackwood MD    Department of Dermatology  [facility]: Phone: [phone], Fax:[phone]  [facility]: Phone: [phone], Fax: [phone]   ____________________________________________    CC: Skin Check (Patient here for a skin check, areas of concern L wrist, )    HPI:  Mr. Robert Carolina is a(n) 68 year old male who presents today as a return patient for a skin check.     Today, he is concerned with a spot on his L wrist that has been present for > 30 years. Patient reports it has scabbed over.    Patient is otherwise feeling well, without additional skin concerns.    Labs Reviewed:  N/A    Physical Exam:  Vitals: There were no vitals taken for this visit.  SKIN: Total skin excluding the undergarment areas was performed. The exam included the head/face, neck, both arms, chest, back, abdomen, both legs, digits and/or nails.   - There are erythematous macules with overyling adherent scale on the crown.   - There is no erythema, telangectasias, nodularity, or pigmentation on the sites of previous NMSC and DN.  - There is a waxy stuck on tan to brown papule on the L wrist.    - No other lesions of concern on  areas examined.     Medications:  Current Outpatient Medications   Medication     ALAVERT OR     clindamycin (CLEOCIN T) 1 % external lotion     clindamycin (CLEOCIN) 300 MG capsule     COMPOUNDED NON-CONTROLLED SUBSTANCE (CMPD RX) - PHARMACY TO MIX COMPOUNDED MEDICATION     COMPOUNDED NON-CONTROLLED SUBSTANCE (CMPD RX) - PHARMACY TO MIX COMPOUNDED MEDICATION     fluticasone (FLONASE) 50 MCG/ACT nasal spray     levothyroxine (SYNTHROID/LEVOTHROID) 100 MCG tablet     metFORMIN (GLUCOPHAGE-XR) 500 MG 24 hr tablet     OCUVITE OR     omeprazole (PRILOSEC) 40 MG DR capsule     pravastatin (PRAVACHOL) 40 MG tablet     terazosin (HYTRIN) 5 MG capsule     TYLENOL 325 MG PO TABS     No current facility-administered medications for this visit.      Past Medical History:   Patient Active Problem List   Diagnosis     Nasal polyposis     Chronic sinusitis     Advanced directives, counseling/discussion     SCOTT (obstructive sleep apnea)-Mild (AHI 6)     Nevus of multiple sites of trunk     Hypothyroidism due to Hashimoto's thyroiditis     Dupuytren's contracture of both hands     Personal history of gastric ulcer     Nocturia associated with benign prostatic hyperplasia     Hypertension, goal below 140/90     Family hx of prostate cancer     Diabetes mellitus, type 2 (H)     Hyperlipidemia LDL goal <100     Past Medical History:   Diagnosis Date     Bleeding ulcer 2008    Duodenal      Chronic sinusitis      Seasonal allergies         CC No referring provider defined for this encounter. on close of this encounter.      Again, thank you for allowing me to participate in the care of your patient.        Sincerely,        Alisha Blackwood MD

## 2023-10-20 NOTE — TELEPHONE ENCOUNTER
Requested Prescriptions   Pending Prescriptions Disp Refills    clindamycin (CLEOCIN) 300 MG capsule 63 capsule 1     Sig: Take 1 capsule (300 mg) by mouth 3 times daily       There is no refill protocol information for this order

## 2023-10-20 NOTE — PROGRESS NOTES
[facility] Dermatology Note  Encounter Date: Oct 20, 2023  Office Visit     Dermatology Problem List:  Last skin check 10/20/23, recommend yearly    1. 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 History of DN  - Compound dysplastic nevus with moderate atypia, R mid lateral back 5/27/22; Clinical observation.   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 AK   - scalp, s/p cryo 3/31/23  4. Folliculitis (resolved)    ____________________________________________    Assessment & Plan:    # H/O NMSC. No clinical evidence of recurrence.  # History of dysplastic nevi.   - ABCDEs: Counseled ABCDEs of melanoma: Asymmetry, Border (irregularity), Color (not uniform, changes in color), Diameter (greater than 6 mm which is about the size of a pencil eraser), and Evolving (any changes in preexisting moles).  - Sun protection: Counseled SPF30+ sunscreen, UPF clothing, sun avoidance, tanning bed avoidance.  - Recommended regular skin checks.      # SK, non-irritated, L wrist. recheck at 6 months  - Monitor: Patient to continue monitoring at home and will contact the clinic for any changes.       # Actinic keratosis x 5, crown.  - Cryotherapy performed today (see procedure note below).      Procedures Performed:   - Cryotherapy procedure note, location(s): crown. After verbal consent and discussion of risks and benefits including, but not limited to, dyspigmentation/scar, blister, and pain, 5 lesion(s) was(were) treated with 1-2 mm freeze border for 1-2 cycles with liquid nitrogen. Post cryotherapy instructions were provided.    Follow-up: 6 months  skin check, or earlier for new or changing lesions    Staff and Scribe:     Scribe Disclosure:   Ana TRAN, am serving as a scribe to document services personally performed by Alisha  MD Merced based on data collection and the provider's statements to me.     Scribe Disclosure:   I, Bonny Ortiz, am serving as a scribe to document services personally performed by Alisha Blackwood MD based on data collection and the provider's statements to me.     Provider Disclosure:   The documentation recorded by the scribe accurately reflects the services I personally performed and the decisions made by me.    Alisha Blackwood MD    Department of Dermatology  [facility]: Phone: [phone], Fax:[phone]  [facility]: Phone: [phone], Fax: [phone]   ____________________________________________    CC: Skin Check (Patient here for a skin check, areas of concern L wrist, )    HPI:  Mr. Robert Carolina is a(n) 68 year old male who presents today as a return patient for a skin check.     Today, he is concerned with a spot on his L wrist that has been present for > 30 years. Patient reports it has scabbed over.    Patient is otherwise feeling well, without additional skin concerns.    Labs Reviewed:  N/A    Physical Exam:  Vitals: There were no vitals taken for this visit.  SKIN: Total skin excluding the undergarment areas was performed. The exam included the head/face, neck, both arms, chest, back, abdomen, both legs, digits and/or nails.   - There are erythematous macules with overyling adherent scale on the crown.   - There is no erythema, telangectasias, nodularity, or pigmentation on the sites of previous NMSC and DN.  - There is a waxy stuck on tan to brown papule on the L wrist.    - No other lesions of concern on areas examined.     Medications:  Current Outpatient Medications   Medication    ALAVERT OR    clindamycin (CLEOCIN T) 1 % external lotion    clindamycin (CLEOCIN) 300 MG capsule    COMPOUNDED NON-CONTROLLED SUBSTANCE (CMPD RX) - PHARMACY TO MIX COMPOUNDED MEDICATION     COMPOUNDED NON-CONTROLLED SUBSTANCE (CMPD RX) - PHARMACY TO MIX COMPOUNDED MEDICATION    fluticasone (FLONASE) 50 MCG/ACT nasal spray    levothyroxine (SYNTHROID/LEVOTHROID) 100 MCG tablet    metFORMIN (GLUCOPHAGE-XR) 500 MG 24 hr tablet    OCUVITE OR    omeprazole (PRILOSEC) 40 MG DR capsule    pravastatin (PRAVACHOL) 40 MG tablet    terazosin (HYTRIN) 5 MG capsule    TYLENOL 325 MG PO TABS     No current facility-administered medications for this visit.      Past Medical History:   Patient Active Problem List   Diagnosis    Nasal polyposis    Chronic sinusitis    Advanced directives, counseling/discussion    SCOTT (obstructive sleep apnea)-Mild (AHI 6)    Nevus of multiple sites of trunk    Hypothyroidism due to Hashimoto's thyroiditis    Dupuytren's contracture of both hands    Personal history of gastric ulcer    Nocturia associated with benign prostatic hyperplasia    Hypertension, goal below 140/90    Family hx of prostate cancer    Diabetes mellitus, type 2 (H)    Hyperlipidemia LDL goal <100     Past Medical History:   Diagnosis Date    Bleeding ulcer 2008    Duodenal     Chronic sinusitis     Seasonal allergies         CC No referring provider defined for this encounter. on close of this encounter.

## 2023-10-23 ENCOUNTER — MYC MEDICAL ADVICE (OUTPATIENT)
Dept: OTOLARYNGOLOGY | Facility: CLINIC | Age: 68
End: 2023-10-23
Payer: COMMERCIAL

## 2023-10-23 DIAGNOSIS — B49 NONINVASIVE FUNGAL SINUSITIS: ICD-10-CM

## 2023-10-23 DIAGNOSIS — J32.9 NONINVASIVE FUNGAL SINUSITIS: ICD-10-CM

## 2023-11-06 LAB — BACTERIA SPEC CULT: ABNORMAL

## 2024-02-11 ENCOUNTER — HEALTH MAINTENANCE LETTER (OUTPATIENT)
Age: 69
End: 2024-02-11

## 2024-05-01 NOTE — PROGRESS NOTES
History of Present Illness - Robert Carolina is a very pleasant 68 year old male last seen on 10/9/2023    He is status post functinoal endoscopic sinus surgery for polyposis on 7/21/2011.    To review the year 2013, on exam at the 6/10/2013 visit, things looked good, with some residual disease on the LEFT.  I had him continue the rinses and at the visit on 9/16/13 visit there was still some polypoid disease on the LEFT side.    At the 12/16/13 visit things looked the best I had seen in a year, and I asked him to continue the same irrigation regimen, and also discussed adding surfactant.  He did not try the surfactant at that point, and tells me that unfortunately things had taken a slight turn for the worse at the beginning of 2014.  He could tell because he feels more congested and has a bit of a sore throat from increased post nasal drainage.  And at the 3/17/2014 visit there was clearly endoscopic evidence of purulent disease on the RIGHT side.  I added itraconazole to his Gent and Dex regimen, and also treated with 14 days of bactrim at the 3/17/14 visit.    At the 12/15/14 visit he told me that the only change he had made was adding a drop of baby shampoo to his sinus saline irrigations, and he is happy to report that he thinks it is helping.  His nose is still feeling great, much more open and clear, and there has been no facial pressure.    At the 6/23/2014 visit endoscopy showed his nose looked the best it had in years.    At 12/15/14 visit and the 4/13/15 visit, no changes, still using the rinses with baby shampoo in a saline, as well as medicated rinses 4x/Week.  Unfortunately at the 10/12/15 visit, there was a lot of purulent drainage seen on the LEFT.  SO we doubled the use of the compound irrigations and that got things under control.  So overall in 2015 and 2016, he has maintained himself on really nothing more than saline NeilMed irrigations with a bit of baby shampoo.    At the 8/22/17 visit, he  also had issues with a chronic tickling dry cough.  It seemed to be laryngopharyngeal reflux< so I started reflux medication and he is also here to follow up on that issue as well.  He tells me that the reflux medicaiton didn't seem to help, so he is trying dietary modifications.    At the 8//22/2017 exam, I did find a small early polyp on the LEFT side.  We continued medications, but he did end up having a full blown sinusitis in October.  Since that time, and also at the visit on 12/12/17, there was persistent polyp growth on the LEFT, and then again on the RIGHT.  Treatment with medicated irrigations, but there was still recurrent polyposis, and so at the end of the 12/12 visit I added Budesonide in NeilMed irrigations.      But at this point, he was only on Gent Dex irrigations daily, but his nose is still a problem on occasion with thick and occasionally bloody drainage.  But at the April 2021 visit, he also had a new issue of a submental neck mass.  CT was done and showed what was most consistent with reactive node, no other masses. But given the size, I did request a needle biopsy done, but as far as I can tell that was not done.    At the follow up on 1/27/22 he told me that about a month prior he started having congestion and bloody crusting, so he took an old prescription of Biaxin about 2-3 weeks ago.  He tells me that it helped, but not completely.  He has been on Gent Dex irrigations daily, as well as saline rinses with baby shampoo.    ON exam at that visit, there was hemalatha purulent crusts, LEFT more than RIGHT and I cultured.  Based on those results I changed him to a Clindamycin nasal irrigation and he is here for follow up.  I have not done a new CT sinus since early 2021.    At the 3/28/2022 visit, endoscopic exam finally showed resolution.  I asked him to just use the clinda rinses for long term maintenance and he is here for follow up.    Since seeing me early 2023, he did have a sinus infection  and was treated with oral antibiotics but it did not seem to clear up. He is still on his long term saline with baby shampoo, and the clindamycin irrigations.      But at the end of 2023 he presented with very persistent sinusitis symptoms and cultures grew out Pseudomonas and Alternaria.  I treated with oral Biaxin and a month of Ampho B rinses.  He tells me that those medication helped somewhat.  He is not getting headaches, but there is still an increase in symptoms.  He is still doing fungal rinses at this point.    Past Medical History -   Patient Active Problem List   Diagnosis    Nasal polyposis    Chronic sinusitis    Advanced directives, counseling/discussion    SCOTT (obstructive sleep apnea)-Mild (AHI 6)    Nevus of multiple sites of trunk    Hypothyroidism due to Hashimoto's thyroiditis    Dupuytren's contracture of both hands    Personal history of gastric ulcer    Nocturia associated with benign prostatic hyperplasia    Hypertension, goal below 140/90    Family hx of prostate cancer    Diabetes mellitus, type 2 (H)    Hyperlipidemia LDL goal <100       Current Medications -   Current Outpatient Medications:     ALAVERT OR, once daily, Disp: , Rfl:     clindamycin (CLEOCIN T) 1 % external lotion, Apply once daily to chest, back, thighs, and buttocks., Disp: 60 mL, Rfl: 1    clindamycin (CLEOCIN) 300 MG capsule, Take 1 capsule (300 mg) by mouth 3 times daily, Disp: 63 capsule, Rfl: 1    COMPOUNDED NON-CONTROLLED SUBSTANCE (CMPD RX) - PHARMACY TO MIX COMPOUNDED MEDICATION, Apply 20 mLs into each nare 2 times daily Amphotericin B, 100 micrograms/mL saline, Disp: 2000 mL, Rfl: 6    fluticasone (FLONASE) 50 MCG/ACT nasal spray, SHAKE LIQUID AND USE 2 SPRAYS IN EACH NOSTRIL DAILY, Disp: 48 g, Rfl: 1    levothyroxine (SYNTHROID/LEVOTHROID) 100 MCG tablet, TAKE 1 TABLET(100 MCG) BY MOUTH DAILY, Disp: 90 tablet, Rfl: 3    metFORMIN (GLUCOPHAGE-XR) 500 MG 24 hr tablet, Take 1 tablet (500 mg) by mouth daily (with  dinner), Disp: 90 tablet, Rfl: 3    OCUVITE OR, once daily, Disp: , Rfl:     omeprazole (PRILOSEC) 40 MG DR capsule, Take 1 capsule (40 mg) by mouth daily, Disp: 90 capsule, Rfl: 3    pravastatin (PRAVACHOL) 40 MG tablet, Take 1 tablet (40 mg) by mouth daily Please follow up in clinic for next refill., Disp: 90 tablet, Rfl: 0    terazosin (HYTRIN) 5 MG capsule, Take 1 capsule (5 mg) by mouth At Bedtime, Disp: 90 capsule, Rfl: 1    TYLENOL 325 MG PO TABS, 4 time per week, Disp: , Rfl:     Allergies -   Allergies   Allergen Reactions    Ciprofloxacin Other (See Comments)     Leg pain    Carbaphen Nausea    Ceftin GI Disturbance    Cefuroxime Nausea     Other reaction(s): Stomach Upset  Other reaction(s): Gastrointestinal  Other reaction(s): Stomach Upset  Other reaction(s): Stomach Upset    No Clinical Screening - See Comments Rash    Oxycodone-Acetaminophen Rash       Social History -   Social History     Socioeconomic History    Marital status:      Spouse name: Not on file    Number of children: Not on file    Years of education: Not on file    Highest education level: Not on file   Occupational History    Not on file   Social Needs    Financial resource strain: Not on file    Food insecurity     Worry: Not on file     Inability: Not on file    Transportation needs     Medical: Not on file     Non-medical: Not on file   Tobacco Use    Smoking status: Never Smoker    Smokeless tobacco: Never Used   Substance and Sexual Activity    Alcohol use: Yes     Comment: 5 per week    Drug use: No    Sexual activity: Yes     Partners: Female     Birth control/protection: None   Lifestyle    Physical activity     Days per week: Not on file     Minutes per session: Not on file    Stress: Not on file   Relationships    Social connections     Talks on phone: Not on file     Gets together: Not on file     Attends Mu-[employer] service: Not on file     Active member of club or organization: Not on file     Attends meetings of  clubs or organizations: Not on file     Relationship status: Not on file    Intimate partner violence     Fear of current or ex partner: Not on file     Emotionally abused: Not on file     Physically abused: Not on file     Forced sexual activity: Not on file   Other Topics Concern    Parent/sibling w/ CABG, MI or angioplasty before 65F 55M? Not Asked   Social History Narrative    Not on file       Family History -   Family History   Problem Relation Age of Onset    Diabetes Mother     Skin Cancer Father     Prostate Cancer Brother     Melanoma No family hx of        Review of Systems - As per HPI and PMHx, otherwise 10+ system review of the head and neck, and general constitution is negative.    Physical Exam    /73   Pulse 74     General - The patient is well nourished and well developed, and appears to have good nutritional status.  Alert and oriented to person and place, answers questions and cooperates with examination appropriately.   Head and Face - Normocephalic and atraumatic, with no gross asymmetry noted of the contour of the facial features.  The facial nerve is intact, with strong symmetric movements.  Voice and Breathing - The patient was breathing comfortably without the use of accessory muscles. There was no wheezing, stridor, or stertor.  The patients voice was clear and strong, and had appropriate pitch and quality.  Ears - The tympanic membranes are normal in appearance, bony landmarks are intact.  No retraction, perforation, or masses.  No fluid or purulence was seen in the external canal or the middle ear. No evidence of infection of the middle ear or external canal, cerumen was normal in appearance.  Eyes - Extraocular movements intact, and the pupils were reactive to light.  Sclera were not icteric or injected, conjunctiva were pink and moist.      PROCEDURE  To evaluate the nose in the postoperative state I performed rigid nasal endoscopy.  I first sprayed with lidocaine and  neosynephrine.  I began with the LEFT side using a 2.7mm 30 degree rigid nasal endoscope, and color photographs were taken for the medical record.    The middle meatus was open, and I was able to pass the scope through.  The LEFT maxillary antrostomy is open and there was a clearly visible purulent stream of drainage that I cultured directly..  Going further back, the ethmoid roof is nicely re-mucosalized, and there is no abnormal secretions or polypoid degeneration.    The right side was then examined.  The middle meatus was open and I visualized the RIGHT antrostomy was open. Howveer today, the LEFT ethmoid roof was completely filled with small polyps.  The mucosa is healthy, and there were polyps or polypoid degenerations.          A/P - Robert Carolina is a 68 year old male  (J32.4) Chronic pansinusitis  (primary encounter diagnosis)  (J33.9) Nasal polyposis    There is a clearly visualized return of polyps on the LEFT more than RIGHT, but the RIGHT middle meatus is full and the RIGHT maxillary os is blocked.  I have cultured today and will adjust therapy as needed. But we will also likely need to start a budesonide component to the rinses.    I will contact him with culture results.    We will do everything possible to avoid surgery given the recent MI and blood thinners

## 2024-05-09 ENCOUNTER — OFFICE VISIT (OUTPATIENT)
Dept: OTOLARYNGOLOGY | Facility: CLINIC | Age: 69
End: 2024-05-09
Payer: COMMERCIAL

## 2024-05-09 VITALS — SYSTOLIC BLOOD PRESSURE: 121 MMHG | DIASTOLIC BLOOD PRESSURE: 73 MMHG | HEART RATE: 74 BPM

## 2024-05-09 DIAGNOSIS — J32.4 CHRONIC PANSINUSITIS: ICD-10-CM

## 2024-05-09 DIAGNOSIS — B49 NONINVASIVE FUNGAL SINUSITIS: Primary | ICD-10-CM

## 2024-05-09 DIAGNOSIS — J32.9 NONINVASIVE FUNGAL SINUSITIS: Primary | ICD-10-CM

## 2024-05-09 PROCEDURE — 87102 FUNGUS ISOLATION CULTURE: CPT | Performed by: OTOLARYNGOLOGY

## 2024-05-09 PROCEDURE — 87076 CULTURE ANAEROBE IDENT EACH: CPT | Performed by: OTOLARYNGOLOGY

## 2024-05-09 PROCEDURE — 99213 OFFICE O/P EST LOW 20 MIN: CPT | Mod: 25 | Performed by: OTOLARYNGOLOGY

## 2024-05-09 PROCEDURE — 87070 CULTURE OTHR SPECIMN AEROBIC: CPT | Performed by: OTOLARYNGOLOGY

## 2024-05-09 PROCEDURE — 87075 CULTR BACTERIA EXCEPT BLOOD: CPT | Performed by: OTOLARYNGOLOGY

## 2024-05-09 PROCEDURE — 31231 NASAL ENDOSCOPY DX: CPT | Performed by: OTOLARYNGOLOGY

## 2024-05-09 NOTE — LETTER
5/9/2024         RE: Robert Carolina  [address] [place] [address]        Dear Colleague,    Thank you for referring your patient, Robert Carolina, to the [facility]. Please see a copy of my visit note below.    History of Present Illness - Robert Carolina is a very pleasant 68 year old male last seen on 10/9/2023    He is status post functinoal endoscopic sinus surgery for polyposis on 7/21/2011.    To review the year 2013, on exam at the 6/10/2013 visit, things looked good, with some residual disease on the LEFT.  I had him continue the rinses and at the visit on 9/16/13 visit there was still some polypoid disease on the LEFT side.    At the 12/16/13 visit things looked the best I had seen in a year, and I asked him to continue the same irrigation regimen, and also discussed adding surfactant.  He did not try the surfactant at that point, and tells me that unfortunately things had taken a slight turn for the worse at the beginning of 2014.  He could tell because he feels more congested and has a bit of a sore throat from increased post nasal drainage.  And at the 3/17/2014 visit there was clearly endoscopic evidence of purulent disease on the RIGHT side.  I added itraconazole to his Gent and Dex regimen, and also treated with 14 days of bactrim at the 3/17/14 visit.    At the 12/15/14 visit he told me that the only change he had made was adding a drop of baby shampoo to his sinus saline irrigations, and he is happy to report that he thinks it is helping.  His nose is still feeling great, much more open and clear, and there has been no facial pressure.    At the 6/23/2014 visit endoscopy showed his nose looked the best it had in years.    At 12/15/14 visit and the 4/13/15 visit, no changes, still using the rinses with baby shampoo in a saline, as well as medicated rinses 4x/Week.  Unfortunately at the 10/12/15 visit, there was a lot of purulent drainage seen on the LEFT.   SO we doubled the use of the compound irrigations and that got things under control.  So overall in 2015 and 2016, he has maintained himself on really nothing more than saline NeilMed irrigations with a bit of baby shampoo.    At the 8/22/17 visit, he also had issues with a chronic tickling dry cough.  It seemed to be laryngopharyngeal reflux< so I started reflux medication and he is also here to follow up on that issue as well.  He tells me that the reflux medicaiton didn't seem to help, so he is trying dietary modifications.    At the 8//22/2017 exam, I did find a small early polyp on the LEFT side.  We continued medications, but he did end up having a full blown sinusitis in October.  Since that time, and also at the visit on 12/12/17, there was persistent polyp growth on the LEFT, and then again on the RIGHT.  Treatment with medicated irrigations, but there was still recurrent polyposis, and so at the end of the 12/12 visit I added Budesonide in NeilMed irrigations.      But at this point, he was only on Gent Dex irrigations daily, but his nose is still a problem on occasion with thick and occasionally bloody drainage.  But at the April 2021 visit, he also had a new issue of a submental neck mass.  CT was done and showed what was most consistent with reactive node, no other masses. But given the size, I did request a needle biopsy done, but as far as I can tell that was not done.    At the follow up on 1/27/22 he told me that about a month prior he started having congestion and bloody crusting, so he took an old prescription of Biaxin about 2-3 weeks ago.  He tells me that it helped, but not completely.  He has been on Gent Dex irrigations daily, as well as saline rinses with baby shampoo.    ON exam at that visit, there was hemalatha purulent crusts, LEFT more than RIGHT and I cultured.  Based on those results I changed him to a Clindamycin nasal irrigation and he is here for follow up.  I have not done a new CT  sinus since early 2021.    At the 3/28/2022 visit, endoscopic exam finally showed resolution.  I asked him to just use the clinda rinses for long term maintenance and he is here for follow up.    Since seeing me early 2023, he did have a sinus infection and was treated with oral antibiotics but it did not seem to clear up. He is still on his long term saline with baby shampoo, and the clindamycin irrigations.      But at the end of 2023 he presented with very persistent sinusitis symptoms and cultures grew out Pseudomonas and Alternaria.  I treated with oral Biaxin and a month of Ampho B rinses.  He tells me that those medication helped somewhat.  He is not getting headaches, but there is still an increase in symptoms.  He is still doing fungal rinses at this point.    Past Medical History -   Patient Active Problem List   Diagnosis     Nasal polyposis     Chronic sinusitis     Advanced directives, counseling/discussion     SCOTT (obstructive sleep apnea)-Mild (AHI 6)     Nevus of multiple sites of trunk     Hypothyroidism due to Hashimoto's thyroiditis     Dupuytren's contracture of both hands     Personal history of gastric ulcer     Nocturia associated with benign prostatic hyperplasia     Hypertension, goal below 140/90     Family hx of prostate cancer     Diabetes mellitus, type 2 (H)     Hyperlipidemia LDL goal <100       Current Medications -   Current Outpatient Medications:      ALAVERT OR, once daily, Disp: , Rfl:      clindamycin (CLEOCIN T) 1 % external lotion, Apply once daily to chest, back, thighs, and buttocks., Disp: 60 mL, Rfl: 1     clindamycin (CLEOCIN) 300 MG capsule, Take 1 capsule (300 mg) by mouth 3 times daily, Disp: 63 capsule, Rfl: 1     COMPOUNDED NON-CONTROLLED SUBSTANCE (CMPD RX) - PHARMACY TO MIX COMPOUNDED MEDICATION, Apply 20 mLs into each nare 2 times daily Amphotericin B, 100 micrograms/mL saline, Disp: 2000 mL, Rfl: 6     fluticasone (FLONASE) 50 MCG/ACT nasal spray, SHAKE LIQUID  AND USE 2 SPRAYS IN EACH NOSTRIL DAILY, Disp: 48 g, Rfl: 1     levothyroxine (SYNTHROID/LEVOTHROID) 100 MCG tablet, TAKE 1 TABLET(100 MCG) BY MOUTH DAILY, Disp: 90 tablet, Rfl: 3     metFORMIN (GLUCOPHAGE-XR) 500 MG 24 hr tablet, Take 1 tablet (500 mg) by mouth daily (with dinner), Disp: 90 tablet, Rfl: 3     OCUVITE OR, once daily, Disp: , Rfl:      omeprazole (PRILOSEC) 40 MG DR capsule, Take 1 capsule (40 mg) by mouth daily, Disp: 90 capsule, Rfl: 3     pravastatin (PRAVACHOL) 40 MG tablet, Take 1 tablet (40 mg) by mouth daily Please follow up in clinic for next refill., Disp: 90 tablet, Rfl: 0     terazosin (HYTRIN) 5 MG capsule, Take 1 capsule (5 mg) by mouth At Bedtime, Disp: 90 capsule, Rfl: 1     TYLENOL 325 MG PO TABS, 4 time per week, Disp: , Rfl:     Allergies -   Allergies   Allergen Reactions     Ciprofloxacin Other (See Comments)     Leg pain     Carbaphen Nausea     Ceftin GI Disturbance     Cefuroxime Nausea     Other reaction(s): Stomach Upset  Other reaction(s): Gastrointestinal  Other reaction(s): Stomach Upset  Other reaction(s): Stomach Upset     No Clinical Screening - See Comments Rash     Oxycodone-Acetaminophen Rash       Social History -   Social History     Socioeconomic History     Marital status:      Spouse name: Not on file     Number of children: Not on file     Years of education: Not on file     Highest education level: Not on file   Occupational History     Not on file   Social Needs     Financial resource strain: Not on file     Food insecurity     Worry: Not on file     Inability: Not on file     Transportation needs     Medical: Not on file     Non-medical: Not on file   Tobacco Use     Smoking status: Never Smoker     Smokeless tobacco: Never Used   Substance and Sexual Activity     Alcohol use: Yes     Comment: 5 per week     Drug use: No     Sexual activity: Yes     Partners: Female     Birth control/protection: None   Lifestyle     Physical activity     Days per week:  Not on file     Minutes per session: Not on file     Stress: Not on file   Relationships     Social connections     Talks on phone: Not on file     Gets together: Not on file     Attends [employer] service: Not on file     Active member of club or organization: Not on file     Attends meetings of clubs or organizations: Not on file     Relationship status: Not on file     Intimate partner violence     Fear of current or ex partner: Not on file     Emotionally abused: Not on file     Physically abused: Not on file     Forced sexual activity: Not on file   Other Topics Concern     Parent/sibling w/ CABG, MI or angioplasty before 65F 55M? Not Asked   Social History Narrative     Not on file       Family History -   Family History   Problem Relation Age of Onset     Diabetes Mother      Skin Cancer Father      Prostate Cancer Brother      Melanoma No family hx of        Review of Systems - As per HPI and PMHx, otherwise 10+ system review of the head and neck, and general constitution is negative.    Physical Exam    /73   Pulse 74     General - The patient is well nourished and well developed, and appears to have good nutritional status.  Alert and oriented to person and place, answers questions and cooperates with examination appropriately.   Head and Face - Normocephalic and atraumatic, with no gross asymmetry noted of the contour of the facial features.  The facial nerve is intact, with strong symmetric movements.  Voice and Breathing - The patient was breathing comfortably without the use of accessory muscles. There was no wheezing, stridor, or stertor.  The patients voice was clear and strong, and had appropriate pitch and quality.  Ears - The tympanic membranes are normal in appearance, bony landmarks are intact.  No retraction, perforation, or masses.  No fluid or purulence was seen in the external canal or the middle ear. No evidence of infection of the middle ear or external canal, cerumen was normal in  appearance.  Eyes - Extraocular movements intact, and the pupils were reactive to light.  Sclera were not icteric or injected, conjunctiva were pink and moist.      PROCEDURE  To evaluate the nose in the postoperative state I performed rigid nasal endoscopy.  I first sprayed with lidocaine and neosynephrine.  I began with the LEFT side using a 2.7mm 30 degree rigid nasal endoscope, and color photographs were taken for the medical record.    The middle meatus was open, and I was able to pass the scope through.  The LEFT maxillary antrostomy is open and there was a clearly visible purulent stream of drainage that I cultured directly..  Going further back, the ethmoid roof is nicely re-mucosalized, and there is no abnormal secretions or polypoid degeneration.    The right side was then examined.  The middle meatus was open and I visualized the RIGHT antrostomy was open. Howveer today, the LEFT ethmoid roof was completely filled with small polyps.  The mucosa is healthy, and there were polyps or polypoid degenerations.          A/P - Robert Carolina is a 68 year old male  (J32.4) Chronic pansinusitis  (primary encounter diagnosis)  (J33.9) Nasal polyposis    There is a clearly visualized return of polyps on the LEFT more than RIGHT, but the RIGHT middle meatus is full and the RIGHT maxillary os is blocked.  I have cultured today and will adjust therapy as needed. But we will also likely need to start a budesonide component to the rinses.    I will contact him with culture results.    We will do everything possible to avoid surgery given the recent MI and blood thinners      Again, thank you for allowing me to participate in the care of your patient.        Sincerely,        Colton Wood MD

## 2024-05-11 LAB — BACTERIA SPEC CULT: NORMAL

## 2024-05-16 DIAGNOSIS — J33.9 NASAL POLYPOSIS: Primary | ICD-10-CM

## 2024-05-16 LAB — BACTERIA SPEC CULT: ABNORMAL

## 2024-05-16 RX ORDER — BUDESONIDE 0.5 MG/2ML
INHALANT ORAL
Qty: 60 ML | Refills: 12 | Status: SHIPPED | OUTPATIENT
Start: 2024-05-16 | End: 2024-05-17

## 2024-05-16 NOTE — PATIENT INSTRUCTIONS
Checking for Skin Cancer  You can help find cancer early by checking your skin each month. There are 3 main kinds of skin cancer: melanoma, basal cell carcinoma, and squamous cell carcinoma. Doing monthly skin checks is the best way to find new marks, sores, or skin changes. Follow these instructions for checking your skin.   The ABCDEs of checking moles for melanoma   Check your moles or growths for signs of melanoma using ABCDE:   Asymmetry: The sides of the mole or growth don t match.  Border: The edges are ragged, notched, or blurred.  Color: The color within the mole or growth varies. It could be black, brown, tan, white, or shades of red, gray, or blue.  Diameter: The mole or growth is larger than   inch or 6 mm (size of a pencil eraser).  Evolving: The size, shape, texture, or color of the mole or growth is changing.     ABCDE's of moles on light skin.        ABCDE's of moles on dark skin may be harder to identify.     Checking for other types of skin cancer  Basal cell carcinoma or squamous cell carcinoma cause symptoms like:     A spot or mole that looks different from all other marks on your skin  Changes in how an area feels, such as itching, tenderness, or pain  Changes in the skin's surface, such as oozing, bleeding, or scaliness  A sore that doesn't heal  New swelling, redness, or spread of color beyond the border of a mole    Who s at risk?  Anyone of any skin color can get skin cancer. But you're at greater risk if you have:   Fair skin that freckles easily and burns instead of tanning  Light-colored or red hair  Light-colored eyes  Many moles or abnormal moles on your skin  A long history of unprotected exposure to sunlight or tanning beds  A history of many blistering sunburns as a child or teen  A family history of skin cancer  Been exposed to radiation or chemicals  A weakened immune system  Been exposed to arsenic  If you've had skin cancer in the past, you're at high risk of having it again.    How to check your skin  Do your monthly skin checkups in front of a full-length mirror. Use a room with good lighting so it's easier to see. Use a hand mirror to look at hard-to-see places like your buttocks and back. You can also have a trusted friend or family member help you with these checks. Check every part of your body, including your:   Head (ears, face, neck, and scalp)  Torso (front, back, sides, and under breasts)  Arms (tops, undersides, and armpits)  Hands (palms, backs, and fingers, including under the nails)  Lower back, buttocks, and genitals  Legs (front, back, and sides)  Feet (tops, soles, toes, including under the nails, and between toes)  Watch for new spots on your skin or a spot that's changing in color, shape, size.   If you have a lot of moles, take digital photos of them each month. Make sure to take photos both up close and from a distance. These can help you see if any moles change over time.   Know your skin  Most skin changes aren't cancer. But if you see any changes in your skin, call your healthcare provider right away. Only they can tell you if a change is a problem. If you have skin cancer, seeing your provider can be the first step to getting the treatment that could save your life.   Skip last reviewed this educational content on 10/1/2021    0046-6669 [employer]. All rights reserved. This information is not intended as a substitute for professional medical care. Always follow your healthcare professional's instructions.     Cryotherapy    What is it?  Use of a very cold liquid, such as liquid nitrogen, to freeze and destroy abnormal skin cells that need to be removed    What should I expect?  Tenderness and redness  A small blister that might grow and fill with dark purple blood. There may be crusting.  More than one treatment may be needed if the lesions do not go away.    How do I care for the treated area?  Gently wash the area with your hands when  bathing.  Use a thin layer of Vaseline to help with healing. You may use a Band-Aid.   The area should heal within 7-10 days and may leave behind a pink or lighter color.   Do not use an antibiotic or Neosporin ointment.   You may take acetaminophen (Tylenol) for pain.     Call your doctor if you have:  Severe pain  Signs of infection (warmth, redness, cloudy yellow drainage, and or a bad smell)  Questions or concerns    Who should I call with questions?      [facility]: [phone]      [facility]: [phone]      For urgent needs outside of business hours call the [facility] at [phone] and ask for the dermatology resident on call

## 2024-05-16 NOTE — PROGRESS NOTES
[facility] Dermatology Note  Encounter Date: May 17, 2024  Office Visit     Dermatology Problem List:  Last skin check 5/17/24, recommend yearly     1. 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 History of DN  - Compound dysplastic nevus with moderate atypia, R mid lateral back 5/27/22; Clinical observation.   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 AK   - scalp, s/p cryo 3/31/23  4. Folliculitis (resolved)    ____________________________________________    Assessment & Plan:    # History of  NMSC. No clinical evidence of recurrence today  - ABCDEs: Counseled ABCDEs of melanoma: Asymmetry, Border (irregularity), Color (not uniform, changes in color), Diameter (greater than 6 mm which is about the size of a pencil eraser), and Evolving (any changes in preexisting moles).  - Sun protection: Counseled SPF30+ sunscreen, UPF clothing, sun avoidance, tanning bed avoidance.  - Recommended regular skin exams     # History of dysplastic nevi.   - Plan as above.      # Seborrheic keratosis, non irritated.   - No treatment necessary unless lesion(s) become symptomatic.      # Actinic keratosis.Scalp X5  - Cryotherapy performed today. See procedures section.        Procedures Performed:   - Cryotherapy procedure note, location(s): scalp. After verbal consent and discussion of risks and benefits including, but not limited to, dyspigmentation/scar, blister, and pain, 5 lesion(s) was(were) treated with 1-2 mm freeze border for 1-2 cycles with liquid nitrogen. Post cryotherapy instructions were provided.      Follow-up: 1 year(s) in-person, or earlier for new or changing lesions    Staff and Scribe:     Scribe Disclosure:   Ana TRAN, am serving as a scribe to document services personally performed by Alisha Blackwood MD  based on data collection and the provider's statements to me.       Provider Disclosure:   The documentation recorded by the scribe accurately reflects the services I personally performed and the decisions made by me.    Alisha Blackwood MD    Department of Dermatology  [facility]: Phone: [phone], Fax:[phone]  [facility]: Phone: [phone], Fax: [phone]   ____________________________________________    CC: Skin Check (No areas of concern )    HPI:  Mr. Robert Carolina is a(n) 68 year old male who presents today as a return patient for skin check.    Today, patient reports no concerning areas. Nothing bleeding, crusting, or changing.         Patient is otherwise feeling well, without additional skin concerns.    Labs Reviewed:  N/A    Physical Exam:  Vitals: There were no vitals taken for this visit.  SKIN: Total skin including buttock areas was performed. The exam included the head/face, neck, both arms, chest, back, abdomen, both legs, digits and/or nails.   - There are erythematous macules with overyling adherent scale on the scalp. .   - There is a waxy stuck on tan to brown papule on the trunk and extremties..   - There is no erythema, telangectasias, nodularity, or pigmentation on the site of prior NMSC and DN..   - No other lesions of concern on areas examined.     Medications:  Current Outpatient Medications   Medication Sig Dispense Refill    ALAVERT OR once daily      aspirin 81 MG EC tablet Take 1 tablet by mouth daily      budesonide (PULMICORT) 0.5 MG/2ML neb solution Add one ampule into [employer] sinus rinse bottle with saline mixture and rinse nose daily 180 mL 3    COMPOUNDED NON-CONTROLLED SUBSTANCE (CMPD RX) - PHARMACY TO MIX COMPOUNDED MEDICATION Apply 20 mLs into each nare 2 times daily Amphotericin B, 100 micrograms/mL saline 2000 mL 6    cyanocobalamin (VITAMIN  B-12) 1000 MCG tablet Take 1,000 mcg by mouth once a week      Fiber-Vitamins-Minerals ([employer] FIBER GUMMIES) CHEW Take 2 each by mouth daily      levothyroxine (SYNTHROID/LEVOTHROID) 100 MCG tablet TAKE 1 TABLET(100 MCG) BY MOUTH DAILY 90 tablet 3    losartan (COZAAR) 25 MG tablet Take 0.5 tablets by mouth daily      metFORMIN (GLUCOPHAGE-XR) 500 MG 24 hr tablet Take 1 tablet (500 mg) by mouth daily (with dinner) 90 tablet 3    metoprolol succinate ER (TOPROL XL) 25 MG 24 hr tablet Take 0.5 tablets by mouth daily      nitroGLYcerin (NITROSTAT) 0.4 MG sublingual tablet Place 0.4 mg under the tongue every 2 hours as needed for chest pain      prasugrel (EFFIENT) 10 MG TABS tablet Take 1 tablet by mouth every morning      rosuvastatin (CRESTOR) 40 MG tablet Take 40 mg by mouth daily      sterile water, bottle, irrigation Irrigate with 20 mLs as directed 2 times daily      terazosin (HYTRIN) 5 MG capsule Take 1 capsule (5 mg) by mouth At Bedtime 90 capsule 1    TYLENOL 325 MG PO TABS 4 time per week      Vitamin D3 50 mcg (2000 units) tablet Take 1 tablet by mouth daily      clindamycin (CLEOCIN T) 1 % external lotion Apply once daily to chest, back, thighs, and buttocks. (Patient not taking: Reported on 5/17/2024) 60 mL 1     No current facility-administered medications for this visit.      Past Medical History:   Patient Active Problem List   Diagnosis    Nasal polyposis    Chronic sinusitis    Advanced directives, counseling/discussion    SCOTT (obstructive sleep apnea)-Mild (AHI 6)    Nevus of multiple sites of trunk    Hypothyroidism due to Hashimoto's thyroiditis    Dupuytren's contracture of both hands    Personal history of gastric ulcer    Nocturia associated with benign prostatic hyperplasia    Hypertension, goal below 140/90    Family hx of prostate cancer    Diabetes mellitus, type 2 (H)    Hyperlipidemia LDL goal <100     Past Medical History:   Diagnosis Date    Bleeding ulcer 2008    Duodenal     Chronic  sinusitis     Seasonal allergies         CC Alisha Blackwood MD  [address] [facility] [address]  [place] [address] on close of this encounter.

## 2024-05-17 ENCOUNTER — TELEPHONE (OUTPATIENT)
Dept: OTOLARYNGOLOGY | Facility: CLINIC | Age: 69
End: 2024-05-17

## 2024-05-17 ENCOUNTER — OFFICE VISIT (OUTPATIENT)
Dept: DERMATOLOGY | Facility: CLINIC | Age: 69
End: 2024-05-17
Attending: DERMATOLOGY
Payer: COMMERCIAL

## 2024-05-17 DIAGNOSIS — J33.9 NASAL POLYPOSIS: ICD-10-CM

## 2024-05-17 DIAGNOSIS — L82.1 SK (SEBORRHEIC KERATOSIS): Primary | ICD-10-CM

## 2024-05-17 DIAGNOSIS — Z85.828 HISTORY OF NONMELANOMA SKIN CANCER: ICD-10-CM

## 2024-05-17 DIAGNOSIS — L57.0 ACTINIC KERATOSES: ICD-10-CM

## 2024-05-17 PROCEDURE — 17000 DESTRUCT PREMALG LESION: CPT | Performed by: DERMATOLOGY

## 2024-05-17 PROCEDURE — 17003 DESTRUCT PREMALG LES 2-14: CPT | Performed by: DERMATOLOGY

## 2024-05-17 PROCEDURE — 99213 OFFICE O/P EST LOW 20 MIN: CPT | Mod: 25 | Performed by: DERMATOLOGY

## 2024-05-17 RX ORDER — LOSARTAN POTASSIUM 25 MG/1
0.5 TABLET ORAL DAILY
COMMUNITY
Start: 2024-04-16

## 2024-05-17 RX ORDER — ROSUVASTATIN CALCIUM 40 MG/1
40 TABLET, COATED ORAL DAILY
COMMUNITY
Start: 2024-04-11 | End: 2025-04-06

## 2024-05-17 RX ORDER — PRASUGREL 10 MG/1
1 TABLET, FILM COATED ORAL EVERY MORNING
COMMUNITY
Start: 2024-04-11

## 2024-05-17 RX ORDER — BUDESONIDE 0.5 MG/2ML
INHALANT ORAL
Qty: 180 ML | Refills: 3 | Status: SHIPPED | OUTPATIENT
Start: 2024-05-17

## 2024-05-17 RX ORDER — CHOLECALCIFEROL (VITAMIN D3) 50 MCG
1 TABLET ORAL DAILY
COMMUNITY

## 2024-05-17 RX ORDER — METOPROLOL SUCCINATE 25 MG/1
0.5 TABLET, EXTENDED RELEASE ORAL DAILY
COMMUNITY
Start: 2024-04-12 | End: 2025-04-07

## 2024-05-17 RX ORDER — NITROGLYCERIN 0.4 MG/1
0.4 TABLET SUBLINGUAL
COMMUNITY
Start: 2024-04-12

## 2024-05-17 RX ORDER — LANOLIN ALCOHOL/MO/W.PET/CERES
1000 CREAM (GRAM) TOPICAL WEEKLY
COMMUNITY

## 2024-05-17 RX ORDER — MENTHOL 5.8 MG
2 LOZENGE MUCOUS MEMBRANE DAILY
COMMUNITY

## 2024-05-17 RX ORDER — ASPIRIN 81 MG/1
1 TABLET ORAL DAILY
COMMUNITY
Start: 2023-05-26

## 2024-05-17 NOTE — TELEPHONE ENCOUNTER
Signed Prescriptions:                        Disp   Refills    budesonide (PULMICORT) 0.5 MG/2ML neb solu*180 mL 3        Sig: Add one ampule into NeilMed sinus rinse bottle with           saline mixture and rinse nose daily  Authorizing Provider: PAT HOLDER  Ordering User: JORGE LUIS ARENAS RN Care Coordinator, [facility] 05/17/24 11:24 AM

## 2024-05-17 NOTE — LETTER
5/17/2024         RE: Robert Carolina  [address] [place] [address]        Dear Colleague,    Thank you for referring your patient, Robert Carolina, to the [facility]. Please see a copy of my visit note below.      [facility] Dermatology Note  Encounter Date: May 17, 2024  Office Visit     Dermatology Problem List:  Last skin check 5/17/24, recommend yearly     1. 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 History of DN  - Compound dysplastic nevus with moderate atypia, R mid lateral back 5/27/22; Clinical observation.   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 AK   - scalp, s/p cryo 3/31/23  4. Folliculitis (resolved)    ____________________________________________    Assessment & Plan:    # History of  NMSC. No clinical evidence of recurrence today  - ABCDEs: Counseled ABCDEs of melanoma: Asymmetry, Border (irregularity), Color (not uniform, changes in color), Diameter (greater than 6 mm which is about the size of a pencil eraser), and Evolving (any changes in preexisting moles).  - Sun protection: Counseled SPF30+ sunscreen, UPF clothing, sun avoidance, tanning bed avoidance.  - Recommended regular skin exams     # History of dysplastic nevi.   - Plan as above.      # Seborrheic keratosis, non irritated.   - No treatment necessary unless lesion(s) become symptomatic.      # Actinic keratosis.Scalp X5  - Cryotherapy performed today. See procedures section.        Procedures Performed:   - Cryotherapy procedure note, location(s): scalp. After verbal consent and discussion of risks and benefits including, but not limited to, dyspigmentation/scar, blister, and pain, 5 lesion(s) was(were) treated with 1-2 mm freeze border for 1-2 cycles with liquid nitrogen. Post  cryotherapy instructions were provided.      Follow-up: 1 year(s) in-person, or earlier for new or changing lesions    Staff and Scribe:     Scribe Disclosure:   I, Ana Palomino, am serving as a scribe to document services personally performed by Alisha Blackwood MD based on data collection and the provider's statements to me.       Provider Disclosure:   The documentation recorded by the scribe accurately reflects the services I personally performed and the decisions made by me.    Alisha Blackwood MD    Department of Dermatology  [facility]: Phone: [phone], Fax:[phone]  [facility]: Phone: [phone], Fax: [phone]   ____________________________________________    CC: Skin Check (No areas of concern )    HPI:  Mr. Robert Carolina is a(n) 68 year old male who presents today as a return patient for skin check.    Today, patient reports no concerning areas. Nothing bleeding, crusting, or changing.         Patient is otherwise feeling well, without additional skin concerns.    Labs Reviewed:  N/A    Physical Exam:  Vitals: There were no vitals taken for this visit.  SKIN: Total skin including buttock areas was performed. The exam included the head/face, neck, both arms, chest, back, abdomen, both legs, digits and/or nails.   - There are erythematous macules with overyling adherent scale on the scalp. .   - There is a waxy stuck on tan to brown papule on the trunk and extremties..   - There is no erythema, telangectasias, nodularity, or pigmentation on the site of prior NMSC and DN..   - No other lesions of concern on areas examined.     Medications:  Current Outpatient Medications   Medication Sig Dispense Refill     ALAVERT OR once daily       aspirin 81 MG EC tablet Take 1 tablet by mouth daily       budesonide (PULMICORT) 0.5 MG/2ML neb solution Add one ampule into NeilMed sinus rinse  bottle with saline mixture and rinse nose daily 180 mL 3     COMPOUNDED NON-CONTROLLED SUBSTANCE (CMPD RX) - PHARMACY TO MIX COMPOUNDED MEDICATION Apply 20 mLs into each nare 2 times daily Amphotericin B, 100 micrograms/mL saline 2000 mL 6     cyanocobalamin (VITAMIN B-12) 1000 MCG tablet Take 1,000 mcg by mouth once a week       Fiber-Vitamins-Minerals ([employer] FIBER GUMMIES) CHEW Take 2 each by mouth daily       levothyroxine (SYNTHROID/LEVOTHROID) 100 MCG tablet TAKE 1 TABLET(100 MCG) BY MOUTH DAILY 90 tablet 3     losartan (COZAAR) 25 MG tablet Take 0.5 tablets by mouth daily       metFORMIN (GLUCOPHAGE-XR) 500 MG 24 hr tablet Take 1 tablet (500 mg) by mouth daily (with dinner) 90 tablet 3     metoprolol succinate ER (TOPROL XL) 25 MG 24 hr tablet Take 0.5 tablets by mouth daily       nitroGLYcerin (NITROSTAT) 0.4 MG sublingual tablet Place 0.4 mg under the tongue every 2 hours as needed for chest pain       prasugrel (EFFIENT) 10 MG TABS tablet Take 1 tablet by mouth every morning       rosuvastatin (CRESTOR) 40 MG tablet Take 40 mg by mouth daily       sterile water, bottle, irrigation Irrigate with 20 mLs as directed 2 times daily       terazosin (HYTRIN) 5 MG capsule Take 1 capsule (5 mg) by mouth At Bedtime 90 capsule 1     TYLENOL 325 MG PO TABS 4 time per week       Vitamin D3 50 mcg (2000 units) tablet Take 1 tablet by mouth daily       clindamycin (CLEOCIN T) 1 % external lotion Apply once daily to chest, back, thighs, and buttocks. (Patient not taking: Reported on 5/17/2024) 60 mL 1     No current facility-administered medications for this visit.      Past Medical History:   Patient Active Problem List   Diagnosis     Nasal polyposis     Chronic sinusitis     Advanced directives, counseling/discussion     SCOTT (obstructive sleep apnea)-Mild (AHI 6)     Nevus of multiple sites of trunk     Hypothyroidism due to Hashimoto's thyroiditis     Dupuytren's contracture of both hands     Personal history of gastric  ulcer     Nocturia associated with benign prostatic hyperplasia     Hypertension, goal below 140/90     Family hx of prostate cancer     Diabetes mellitus, type 2 (H)     Hyperlipidemia LDL goal <100     Past Medical History:   Diagnosis Date     Bleeding ulcer 2008    Duodenal      Chronic sinusitis      Seasonal allergies         CC Alisha Blackwood MD  [address] [facility] [address]  [place] [address] on close of this encounter.    Again, thank you for allowing me to participate in the care of your patient.        Sincerely,        Alisha Blackwood MD

## 2024-05-17 NOTE — NURSING NOTE
Robert Carolina's chief complaint for this visit includes:  Chief Complaint   Patient presents with    Skin Check     No areas of concern      PCP: No Ref-Primary, Physician    Referring Provider:  Alisha Blackwood MD  [address] [facility] [address]  [place] [address]    There were no vitals taken for this visit.  Data Unavailable        Allergies   Allergen Reactions    Ciprofloxacin Other (See Comments)     Leg pain    Carbaphen Nausea    Ceftin GI Disturbance    Cefuroxime Nausea     Other reaction(s): Stomach Upset  Other reaction(s): Gastrointestinal  Other reaction(s): Stomach Upset  Other reaction(s): Stomach Upset    No Clinical Screening - See Comments Rash    Oxycodone-Acetaminophen Rash         Do you need any medication refills at today's visit? No

## 2024-06-06 DIAGNOSIS — J32.9 NONINVASIVE FUNGAL SINUSITIS: ICD-10-CM

## 2024-06-06 DIAGNOSIS — B49 NONINVASIVE FUNGAL SINUSITIS: ICD-10-CM

## 2024-06-06 LAB — BACTERIA SPEC CULT: NO GROWTH

## 2024-06-30 ENCOUNTER — HEALTH MAINTENANCE LETTER (OUTPATIENT)
Age: 69
End: 2024-06-30

## 2024-11-17 ENCOUNTER — HEALTH MAINTENANCE LETTER (OUTPATIENT)
Age: 69
End: 2024-11-17

## 2024-12-23 DIAGNOSIS — B49 NONINVASIVE FUNGAL SINUSITIS: ICD-10-CM

## 2024-12-23 DIAGNOSIS — J32.9 NONINVASIVE FUNGAL SINUSITIS: ICD-10-CM

## 2025-01-02 ENCOUNTER — TELEPHONE (OUTPATIENT)
Dept: OTOLARYNGOLOGY | Facility: CLINIC | Age: 70
End: 2025-01-02
Payer: COMMERCIAL

## 2025-01-02 NOTE — TELEPHONE ENCOUNTER
A prior authorization is needed for the following compounded medications prescribed.  Please complete a prior authorization with the information included below.    Medication: Amphotericin b 100mg/l nasal irr (COMPOUNDED)    Ingredients                                                                  NDCs                                                Quantities                         Amphotericin b powd     [id number]   0.100 gram   Propylene glycol liqd     [id number]   10 mls  Sterile water for irrigation soln   [id number]   1000 mls        RX #:[id number]  Reason for Rejection: prod/service not covere. Blan/benefit exclusion    Pharmacy Insurance plan:[facility]  BIN #:[id number]  ID #:[id number]  PCN #:[id number]  Phone #:[phone]      Pharmacy NPI:[id number]      Please advise the Compounding Pharmacy @ [phone] when the prior authorization is approved or denied.     Thank you for your time.    Lida Nava CPhT, HEIDY    [facility]  [address] [place] [id number]   [email or web address]  [email or web address]   Phone: [phone]  Fax: [phone]

## 2025-03-08 ENCOUNTER — HEALTH MAINTENANCE LETTER (OUTPATIENT)
Age: 70
End: 2025-03-08

## 2025-06-01 ENCOUNTER — HEALTH MAINTENANCE LETTER (OUTPATIENT)
Age: 70
End: 2025-06-01

## 2025-06-02 NOTE — PROGRESS NOTES
[facility] Dermatology Note    Encounter Date: Jun 4, 2025    Dermatology Problem List:  Last skin check 6/4/25, recommend yearly     1. 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 History of DN  - Compound dysplastic nevus with moderate atypia, R mid lateral back 5/27/22; Clinical observation.   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 AK   - scalp, s/p cryo 3/31/23, 6/4/25  4. Folliculitis (resolved)    Patient is hard of hearing    ______________________________________    Impression/Plan:    Actinic keratosis, scalp x 1  - Cryotherapy performed today. See procedure section.    2. History of nonmelanoma skin cancer, no clincial evidence of recurrence    3. History of Dysplastic Nevi. No clinical evidence recurrence.    4. Reassurance provided for benign lesions not treated today including cherry angiomata, solar lentigines, seborrheic keratoses, and banal-appearing melanocytic nevi.    Procedures:  Cryotherapy procedure note: After verbal consent and discussion of risks and benefits including, but not limited to, dyspigmentation/scar, blister, and pain, 1 was(were) treated with 1-2 mm freeze border for 1-2 cycles with liquid nitrogen. Post cryotherapy instructions were provided.         Follow-up in 1 year.       Staff Involved:  Staff/Scribe  Scribe Disclosure:   I, Ana Palomino, am serving as a scribe to document services personally performed by Ashish Vila MD based on data collection and the provider's statements to me.     Provider Disclosure:   The documentation recorded by the scribe accurately reflects the services I personally performed and the decisions made by me.    Ashish Vila MD   of Dermatology  Department of Dermatology  [facility]  Medicine        CC:   Chief Complaint   Patient presents with    Derm Problem     FBS, spot on back scalp       History of Present Illness:  Mr. Robert Carolina is a 69 year old male who presents as a return patient.    Today, patient reports he is here for a skin check. Area of concern on the back of scalp that will scab and fall off but then return.     Labs:  N/a     Physical exam:  Vitals: There were no vitals taken for this visit.  GEN: This is a well developed, well-nourished male in no acute distress, in a pleasant mood.    SKIN: [place]idan phototype II  - Full skin, which includes the head/face, both arms, chest, back, abdomen,both legs, genitalia and/or groin buttocks, digits and/or nails, was examined.  - Erythematous scaly macule on the scalp x 1.   - There are dome shaped bright red papules on the head/neck, trunk, extremities.   - Multiple regular brown pigmented macules and papules are identified on the head/neck, trunk, extremities.   - Scattered brown macules on sun exposed areas.  - There are waxy stuck on tan to brown papules on the head/neck, trunk, extremities.  - No other lesions of concern on areas examined.     Past Medical History:   Past Medical History:   Diagnosis Date    Bleeding ulcer 2008    Duodenal     Chronic sinusitis     Seasonal allergies      Past Surgical History:   Procedure Laterality Date    ARTHROSCOPY KNEE RT/LT      LT - Meniscus injury, Dr. Rivera    OPTICAL TRACKING SYSTEM ENDOSCOPIC SINUS SURGERY  7/21/2011    Procedure:OPTICAL TRACKING SYSTEM ENDOSCOPIC SINUS SURGERY; image guided endoscopic septoplasty, bilateral ethmoidectomy, bilateral maxillary antrostomy, polypectomy,bilateral sphenoid sinusotomies, bilateral frontal sinusotomies; Surgeon:PAT HOLDER; Location:[facility] OR    SINUS SURGERY  2009    [facility] NONSPECIFIC PROCEDURE      Stapled Stomach - due to ulcer    [facility] ORAL SURGERY PROCEDURE      [place] teeth       Social History:   reports that he has never  smoked. He has never used smokeless tobacco. He reports current alcohol use. He reports that he does not use drugs.    Family History:  Family History   Problem Relation Age of Onset    Diabetes Mother     Skin Cancer Father     Prostate Cancer Brother     Melanoma No family hx of        Medications:  Current Outpatient Medications   Medication Sig Dispense Refill    ALAVERT OR once daily      aspirin 81 MG EC tablet Take 1 tablet by mouth daily      budesonide (PULMICORT) 0.5 MG/2ML neb solution Add one ampule into [employer] sinus rinse bottle with saline mixture and rinse nose daily 180 mL 3    clindamycin (CLEOCIN T) 1 % external lotion Apply once daily to chest, back, thighs, and buttocks. 60 mL 1    cyanocobalamin (VITAMIN B-12) 1000 MCG tablet Take 1,000 mcg by mouth once a week      Fiber-Vitamins-Minerals ([employer] FIBER GUMMIES) CHEW Take 2 each by mouth daily      levothyroxine (SYNTHROID/LEVOTHROID) 100 MCG tablet TAKE 1 TABLET(100 MCG) BY MOUTH DAILY 90 tablet 3    losartan (COZAAR) 25 MG tablet Take 0.5 tablets by mouth daily      metFORMIN (GLUCOPHAGE-XR) 500 MG 24 hr tablet Take 1 tablet (500 mg) by mouth daily (with dinner) 90 tablet 3    metoprolol succinate ER (TOPROL XL) 25 MG 24 hr tablet Take 0.5 tablets by mouth daily      nitroGLYcerin (NITROSTAT) 0.4 MG sublingual tablet Place 0.4 mg under the tongue every 2 hours as needed for chest pain      prasugrel (EFFIENT) 10 MG TABS tablet Take 1 tablet by mouth every morning      rosuvastatin (CRESTOR) 40 MG tablet Take 40 mg by mouth daily      sterile water, bottle, irrigation Irrigate with 20 mLs as directed 2 times daily      terazosin (HYTRIN) 5 MG capsule Take 1 capsule (5 mg) by mouth At Bedtime 90 capsule 1    TYLENOL 325 MG PO TABS 4 time per week      Vitamin D3 50 mcg (2000 units) tablet Take 1 tablet by mouth daily      COMPOUNDED NON-CONTROLLED SUBSTANCE (CMPD RX) - PHARMACY TO MIX COMPOUNDED MEDICATION Apply 20 mLs into each nare 2 times  daily. Amphotericin B, 100 micrograms/mL saline 2000 mL 6     Allergies   Allergen Reactions    Ciprofloxacin Other (See Comments)     Leg pain    Carbaphen Nausea    Ceftin GI Disturbance    Cefuroxime Nausea     Other reaction(s): Stomach Upset  Other reaction(s): Gastrointestinal  Other reaction(s): Stomach Upset  Other reaction(s): Stomach Upset    No Clinical Screening - See Comments Rash    Oxycodone-Acetaminophen Rash

## 2025-06-04 ENCOUNTER — OFFICE VISIT (OUTPATIENT)
Dept: DERMATOLOGY | Facility: CLINIC | Age: 70
End: 2025-06-04
Payer: COMMERCIAL

## 2025-06-04 DIAGNOSIS — L57.0 ACTINIC KERATOSIS: ICD-10-CM

## 2025-06-04 DIAGNOSIS — Z12.83 SKIN CANCER SCREENING: ICD-10-CM

## 2025-06-04 DIAGNOSIS — D18.01 CHERRY ANGIOMA: ICD-10-CM

## 2025-06-04 DIAGNOSIS — D22.9 MULTIPLE MELANOCYTIC NEVI: ICD-10-CM

## 2025-06-04 DIAGNOSIS — L82.1 SEBORRHEIC KERATOSIS: ICD-10-CM

## 2025-06-04 DIAGNOSIS — Z85.828 HISTORY OF SKIN CANCER: Primary | ICD-10-CM

## 2025-06-04 DIAGNOSIS — L81.4 SOLAR LENTIGO: ICD-10-CM

## 2025-06-04 NOTE — NURSING NOTE
Robert Carolina's goals for this visit include:   Chief Complaint   Patient presents with    Derm Problem     FBSC, spot on back scalp       He requests these members of his care team be copied on today's visit information: no    PCP: No Ref-Primary, Physician    Referring Provider:  Referred Self, MD  No address on file    There were no vitals taken for this visit.    Do you need any medication refills at today's visit? No    Anastasia Anthony LPN

## 2025-06-04 NOTE — LETTER
6/4/2025      Robert PATEL Carolina  [address] [place] [address]      Dear Colleague,    Thank you for referring your patient, Robert Carolina, to the [facility]. Please see a copy of my visit note below.    [facility] Dermatology Note    Encounter Date: Jun 4, 2025    Dermatology Problem List:  Last skin check 6/4/25, recommend yearly     1. 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 History of DN  - Compound dysplastic nevus with moderate atypia, R mid lateral back 5/27/22; Clinical observation.   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 AK   - scalp, s/p cryo 3/31/23, 6/4/25  4. Folliculitis (resolved)    Patient is hard of hearing    ______________________________________    Impression/Plan:    Actinic keratosis, scalp x 1  - Cryotherapy performed today. See procedure section.    2. History of nonmelanoma skin cancer, no clincial evidence of recurrence    3. History of Dysplastic Nevi. No clinical evidence recurrence.    4. Reassurance provided for benign lesions not treated today including cherry angiomata, solar lentigines, seborrheic keratoses, and banal-appearing melanocytic nevi.    Procedures:  Cryotherapy procedure note: After verbal consent and discussion of risks and benefits including, but not limited to, dyspigmentation/scar, blister, and pain, 1 was(were) treated with 1-2 mm freeze border for 1-2 cycles with liquid nitrogen. Post cryotherapy instructions were provided.         Follow-up in 1 year.       Staff Involved:  Staff/Scribe  Scribe Disclosure:   I, Ana Palomino, am serving as a scribe to document services personally performed by Ashish Vila MD based on data collection and the provider's statements to me.     Provider Disclosure:   The  documentation recorded by the scribe accurately reflects the services I personally performed and the decisions made by me.    Ashish Vila MD   of Dermatology  Department of Dermatology  [facility]        CC:   Chief Complaint   Patient presents with     Derm Problem     FBSC, spot on back scalp       History of Present Illness:  Mr. Robert Carolina is a 69 year old male who presents as a return patient.    Today, patient reports he is here for a skin check. Area of concern on the back of scalp that will scab and fall off but then return.     Labs:  N/a     Physical exam:  Vitals: There were no vitals taken for this visit.  GEN: This is a well developed, well-nourished male in no acute distress, in a pleasant mood.    SKIN: [place] phototype II  - Full skin, which includes the head/face, both arms, chest, back, abdomen,both legs, genitalia and/or groin buttocks, digits and/or nails, was examined.  - Erythematous scaly macule on the scalp x 1.   - There are dome shaped bright red papules on the head/neck, trunk, extremities.   - Multiple regular brown pigmented macules and papules are identified on the head/neck, trunk, extremities.   - Scattered brown macules on sun exposed areas.  - There are waxy stuck on tan to brown papules on the head/neck, trunk, extremities.  - No other lesions of concern on areas examined.     Past Medical History:   Past Medical History:   Diagnosis Date     Bleeding ulcer 2008    Duodenal      Chronic sinusitis      Seasonal allergies      Past Surgical History:   Procedure Laterality Date     ARTHROSCOPY KNEE RT/LT      LT - Meniscus injury, Dr. Rivera     OPTICAL TRACKING SYSTEM ENDOSCOPIC SINUS SURGERY  7/21/2011    Procedure:OPTICAL TRACKING SYSTEM ENDOSCOPIC SINUS SURGERY; image guided endoscopic septoplasty, bilateral ethmoidectomy, bilateral maxillary antrostomy, polypectomy,bilateral sphenoid sinusotomies, bilateral  frontal sinusotomies; Surgeon:PAT HOLDER; Location:[facility] OR     SINUS SURGERY  2009     [facility] NONSPECIFIC PROCEDURE      Stapled Stomach - due to ulcer     [facility] ORAL SURGERY PROCEDURE      [place] teeth       Social History:   reports that he has never smoked. He has never used smokeless tobacco. He reports current alcohol use. He reports that he does not use drugs.    Family History:  Family History   Problem Relation Age of Onset     Diabetes Mother      Skin Cancer Father      Prostate Cancer Brother      Melanoma No family hx of        Medications:  Current Outpatient Medications   Medication Sig Dispense Refill     ALAVERT OR once daily       aspirin 81 MG EC tablet Take 1 tablet by mouth daily       budesonide (PULMICORT) 0.5 MG/2ML neb solution Add one ampule into [employer] sinus rinse bottle with saline mixture and rinse nose daily 180 mL 3     clindamycin (CLEOCIN T) 1 % external lotion Apply once daily to chest, back, thighs, and buttocks. 60 mL 1     cyanocobalamin (VITAMIN B-12) 1000 MCG tablet Take 1,000 mcg by mouth once a week       Fiber-Vitamins-Minerals ([employer] FIBER GUMMIES) CHEW Take 2 each by mouth daily       levothyroxine (SYNTHROID/LEVOTHROID) 100 MCG tablet TAKE 1 TABLET(100 MCG) BY MOUTH DAILY 90 tablet 3     losartan (COZAAR) 25 MG tablet Take 0.5 tablets by mouth daily       metFORMIN (GLUCOPHAGE-XR) 500 MG 24 hr tablet Take 1 tablet (500 mg) by mouth daily (with dinner) 90 tablet 3     metoprolol succinate ER (TOPROL XL) 25 MG 24 hr tablet Take 0.5 tablets by mouth daily       nitroGLYcerin (NITROSTAT) 0.4 MG sublingual tablet Place 0.4 mg under the tongue every 2 hours as needed for chest pain       prasugrel (EFFIENT) 10 MG TABS tablet Take 1 tablet by mouth every morning       rosuvastatin (CRESTOR) 40 MG tablet Take 40 mg by mouth daily       sterile water, bottle, irrigation Irrigate with 20 mLs as directed 2 times daily       terazosin (HYTRIN) 5 MG capsule Take 1 capsule (5 mg) by  mouth At Bedtime 90 capsule 1     TYLENOL 325 MG PO TABS 4 time per week       Vitamin D3 50 mcg (2000 units) tablet Take 1 tablet by mouth daily       COMPOUNDED NON-CONTROLLED SUBSTANCE (CMPD RX) - PHARMACY TO MIX COMPOUNDED MEDICATION Apply 20 mLs into each nare 2 times daily. Amphotericin B, 100 micrograms/mL saline 2000 mL 6     Allergies   Allergen Reactions     Ciprofloxacin Other (See Comments)     Leg pain     Carbaphen Nausea     Ceftin GI Disturbance     Cefuroxime Nausea     Other reaction(s): Stomach Upset  Other reaction(s): Gastrointestinal  Other reaction(s): Stomach Upset  Other reaction(s): Stomach Upset     No Clinical Screening - See Comments Rash     Oxycodone-Acetaminophen Rash                 Again, thank you for allowing me to participate in the care of your patient.        Sincerely,        Ashish Vila MD    Electronically signed

## 2025-06-06 NOTE — PROGRESS NOTES
History of Present Illness - Robert Carolina is a very pleasant 69 year old male last seen on 5/9/2024    He is status post functinoal endoscopic sinus surgery for polyposis on 7/21/2011.    To review the year 2013, on exam at the 6/10/2013 visit, things looked good, with some residual disease on the LEFT.  I had him continue the rinses and at the visit on 9/16/13 visit there was still some polypoid disease on the LEFT side.    At the 12/16/13 visit things looked the best I had seen in a year, and I asked him to continue the same irrigation regimen, and also discussed adding surfactant.  He did not try the surfactant at that point, and tells me that unfortunately things had taken a slight turn for the worse at the beginning of 2014.  He could tell because he feels more congested and has a bit of a sore throat from increased post nasal drainage.  And at the 3/17/2014 visit there was clearly endoscopic evidence of purulent disease on the RIGHT side.  I added itraconazole to his Gent and Dex regimen, and also treated with 14 days of bactrim at the 3/17/14 visit.    At the 12/15/14 visit he told me that the only change he had made was adding a drop of baby shampoo to his sinus saline irrigations, and he is happy to report that he thinks it is helping.  His nose is still feeling great, much more open and clear, and there has been no facial pressure.    At the 6/23/2014 visit endoscopy showed his nose looked the best it had in years.    At 12/15/14 visit and the 4/13/15 visit, no changes, still using the rinses with baby shampoo in a saline, as well as medicated rinses 4x/Week.  Unfortunately at the 10/12/15 visit, there was a lot of purulent drainage seen on the LEFT.  SO we doubled the use of the compound irrigations and that got things under control.  So overall in 2015 and 2016, he has maintained himself on really nothing more than saline NeilMed irrigations with a bit of baby shampoo.    At the 8/22/17 visit, he also  had issues with a chronic tickling dry cough.  It seemed to be laryngopharyngeal reflux< so I started reflux medication and he is also here to follow up on that issue as well.  He tells me that the reflux medicaiton didn't seem to help, so he is trying dietary modifications.    At the 8//22/2017 exam, I did find a small early polyp on the LEFT side.  We continued medications, but he did end up having a full blown sinusitis in October.  Since that time, and also at the visit on 12/12/17, there was persistent polyp growth on the LEFT, and then again on the RIGHT.  Treatment with medicated irrigations, but there was still recurrent polyposis, and so at the end of the 12/12 visit I added Budesonide in NeilMed irrigations.      But at this point, he was only on Gent Dex irrigations daily, but his nose is still a problem on occasion with thick and occasionally bloody drainage.  But at the April 2021 visit, he also had a new issue of a submental neck mass.  CT was done and showed what was most consistent with reactive node, no other masses. But given the size, I did request a needle biopsy done, but as far as I can tell that was not done.    At the follow up on 1/27/22 he told me that about a month prior he started having congestion and bloody crusting, so he took an old prescription of Biaxin about 2-3 weeks ago.  He tells me that it helped, but not completely.  He has been on Gent Dex irrigations daily, as well as saline rinses with baby shampoo.    ON exam at that visit, there was hemalatha purulent crusts, LEFT more than RIGHT and I cultured.  Based on those results I changed him to a Clindamycin nasal irrigation and he is here for follow up.  I have not done a new CT sinus since early 2021.    At the 3/28/2022 visit, endoscopic exam finally showed resolution.  I asked him to just use the clinda rinses for long term maintenance and he is here for follow up.    Since seeing me early 2023, he did have a sinus infection and  was treated with oral antibiotics but it did not seem to clear up. He is still on his long term saline with baby shampoo, and the clindamycin irrigations.      But at the end of 2023 he presented with very persistent sinusitis symptoms and cultures grew out Pseudomonas and Alternaria.  I treated with oral Biaxin and a month of Ampho B rinses.  He tells me that those medication helped somewhat.  He is not getting headaches, but there is still an increase in symptoms.  He is still doing fungal rinses at this point.  On endoscopy in 2024 there was clearly some polyp regrowth so we adjusted his nasal irrigations.  We are trying to avoid revision surgery as he has had to go on long term anticoagulation.    He tells me that since seeing me in 2024 he pretty much always feels like he has a sinus infections, and it just gets worse at times.  He is no longer on Amphotericin B rinses due to insurance coverage.    Past Medical History -   Patient Active Problem List   Diagnosis    Nasal polyposis    Chronic sinusitis    SCOTT (obstructive sleep apnea)-Mild (AHI 6)    Nevus of multiple sites of trunk    Hypothyroidism due to Hashimoto's thyroiditis    Dupuytren's contracture of both hands    Personal history of gastric ulcer    Nocturia associated with benign prostatic hyperplasia    Hypertension, goal below 140/90    Family hx of prostate cancer    Diabetes mellitus, type 2 (H)    Hyperlipidemia LDL goal <100       Current Medications -   Current Outpatient Medications:     ALAVERT OR, once daily, Disp: , Rfl:     aspirin 81 MG EC tablet, Take 1 tablet by mouth daily, Disp: , Rfl:     budesonide (PULMICORT) 0.5 MG/2ML neb solution, Add one ampule into NeilMed sinus rinse bottle with saline mixture and rinse nose daily, Disp: 180 mL, Rfl: 3    clindamycin (CLEOCIN T) 1 % external lotion, Apply once daily to chest, back, thighs, and buttocks., Disp: 60 mL, Rfl: 1    COMPOUNDED NON-CONTROLLED SUBSTANCE (CMPD RX) - PHARMACY TO MIX  COMPOUNDED MEDICATION, Apply 20 mLs into each nare 2 times daily. Amphotericin B, 100 micrograms/mL saline, Disp: 2000 mL, Rfl: 6    cyanocobalamin (VITAMIN B-12) 1000 MCG tablet, Take 1,000 mcg by mouth once a week, Disp: , Rfl:     Fiber-Vitamins-Minerals ([employer] FIBER GUMMIES) CHEW, Take 2 each by mouth daily, Disp: , Rfl:     levothyroxine (SYNTHROID/LEVOTHROID) 100 MCG tablet, TAKE 1 TABLET(100 MCG) BY MOUTH DAILY, Disp: 90 tablet, Rfl: 3    losartan (COZAAR) 25 MG tablet, Take 0.5 tablets by mouth daily, Disp: , Rfl:     metFORMIN (GLUCOPHAGE-XR) 500 MG 24 hr tablet, Take 1 tablet (500 mg) by mouth daily (with dinner), Disp: 90 tablet, Rfl: 3    metoprolol succinate ER (TOPROL XL) 25 MG 24 hr tablet, Take 0.5 tablets by mouth daily, Disp: , Rfl:     nitroGLYcerin (NITROSTAT) 0.4 MG sublingual tablet, Place 0.4 mg under the tongue every 2 hours as needed for chest pain, Disp: , Rfl:     prasugrel (EFFIENT) 10 MG TABS tablet, Take 1 tablet by mouth every morning, Disp: , Rfl:     rosuvastatin (CRESTOR) 40 MG tablet, Take 40 mg by mouth daily, Disp: , Rfl:     sterile water, bottle, irrigation, Irrigate with 20 mLs as directed 2 times daily, Disp: , Rfl:     terazosin (HYTRIN) 5 MG capsule, Take 1 capsule (5 mg) by mouth At Bedtime, Disp: 90 capsule, Rfl: 1    TYLENOL 325 MG PO TABS, 4 time per week, Disp: , Rfl:     Vitamin D3 50 mcg (2000 units) tablet, Take 1 tablet by mouth daily, Disp: , Rfl:     Allergies -   Allergies   Allergen Reactions    Ciprofloxacin Other (See Comments)     Leg pain    Carbaphen Nausea    Ceftin GI Disturbance    Cefuroxime Nausea     Other reaction(s): Stomach Upset  Other reaction(s): Gastrointestinal  Other reaction(s): Stomach Upset  Other reaction(s): Stomach Upset    No Clinical Screening - See Comments Rash    Oxycodone-Acetaminophen Rash       Social History -   Social History     Socioeconomic History    Marital status:      Spouse name: Not on file    Number of  "children: Not on file    Years of education: Not on file    Highest education level: Not on file   Occupational History    Not on file   Social Needs    Financial resource strain: Not on file    Food insecurity     Worry: Not on file     Inability: Not on file    Transportation needs     Medical: Not on file     Non-medical: Not on file   Tobacco Use    Smoking status: Never Smoker    Smokeless tobacco: Never Used   Substance and Sexual Activity    Alcohol use: Yes     Comment: 5 per week    Drug use: No    Sexual activity: Yes     Partners: Female     Birth control/protection: None   Lifestyle    Physical activity     Days per week: Not on file     Minutes per session: Not on file    Stress: Not on file   Relationships    Social connections     Talks on phone: Not on file     Gets together: Not on file     Attends Jewish service: Not on file     Active member of club or organization: Not on file     Attends meetings of clubs or organizations: Not on file     Relationship status: Not on file    Intimate partner violence     Fear of current or ex partner: Not on file     Emotionally abused: Not on file     Physically abused: Not on file     Forced sexual activity: Not on file   Other Topics Concern    Parent/sibling w/ CABG, MI or angioplasty before 65F 55M? Not Asked   Social History Narrative    Not on file       Family History -   Family History   Problem Relation Age of Onset    Diabetes Mother     Skin Cancer Father     Prostate Cancer Brother     Melanoma No family hx of        Review of Systems - As per HPI and PMHx, otherwise 10+ system review of the head and neck, and general constitution is negative.    Physical Exam  /75   Pulse 72   Ht 1.753 m (5' 9\")   SpO2 98%   BMI 23.78 kg/m        General - The patient is well nourished and well developed, and appears to have good nutritional status.  Alert and oriented to person and place, answers questions and cooperates with examination appropriately. "   Head and Face - Normocephalic and atraumatic, with no gross asymmetry noted of the contour of the facial features.  The facial nerve is intact, with strong symmetric movements.  Voice and Breathing - The patient was breathing comfortably without the use of accessory muscles. There was no wheezing, stridor, or stertor.  The patients voice was clear and strong, and had appropriate pitch and quality.  Ears - The tympanic membranes are normal in appearance, bony landmarks are intact.  No retraction, perforation, or masses.  No fluid or purulence was seen in the external canal or the middle ear. No evidence of infection of the middle ear or external canal, cerumen was normal in appearance.  Eyes - Extraocular movements intact, and the pupils were reactive to light.  Sclera were not icteric or injected, conjunctiva were pink and moist.      PROCEDURE  To evaluate the nose in the postoperative state I performed rigid nasal endoscopy.  I first sprayed with lidocaine and neosynephrine.  I began with the LEFT side using a 2.7mm 30 degree rigid nasal endoscope, and color photographs were taken for the medical record.    The middle meatus was open, and I was able to pass the scope through.  The LEFT maxillary antrostomy is open but there ws some drainage that I cultured directly..  Going further back, the ethmoid roof is nicely re-mucosalized, and there is no abnormal secretions or polypoid degeneration.    The right side was then examined.  The middle meatus was open and I visualized the RIGHT antrostomy was open. The previously noted small polyps were actually gone today.  The mucosa is healthy, and there were polyps or polypoid degenerations.                            A/P - Robert Carolina is a 68 year old male  (J32.4) Chronic pansinusitis  (primary encounter diagnosis)  (J33.9) Nasal polyposis    Overall, things looked better than I expected.    I have done cultures today for bacterial and fungal growth.  However, based  on direct endoscopic examination of the sinuses, everything actually looks quite open and there are not any sites that immediately call for revision surgery.  Therefore I will wait on ordering a new CT scan, and hopefully we can get this under control based on what the cultures tell us.

## 2025-06-22 ENCOUNTER — HEALTH MAINTENANCE LETTER (OUTPATIENT)
Age: 70
End: 2025-06-22

## 2025-06-23 ENCOUNTER — OFFICE VISIT (OUTPATIENT)
Dept: OTOLARYNGOLOGY | Facility: CLINIC | Age: 70
End: 2025-06-23
Payer: COMMERCIAL

## 2025-06-23 VITALS
HEART RATE: 72 BPM | SYSTOLIC BLOOD PRESSURE: 125 MMHG | DIASTOLIC BLOOD PRESSURE: 75 MMHG | OXYGEN SATURATION: 98 % | HEIGHT: 69 IN | BODY MASS INDEX: 23.78 KG/M2

## 2025-06-23 DIAGNOSIS — J32.4 CHRONIC PANSINUSITIS: ICD-10-CM

## 2025-06-23 DIAGNOSIS — B49 NONINVASIVE FUNGAL SINUSITIS: Primary | ICD-10-CM

## 2025-06-23 DIAGNOSIS — J33.9 NASAL POLYPOSIS: ICD-10-CM

## 2025-06-23 DIAGNOSIS — J32.9 NONINVASIVE FUNGAL SINUSITIS: Primary | ICD-10-CM

## 2025-06-23 PROCEDURE — 87070 CULTURE OTHR SPECIMN AEROBIC: CPT | Performed by: OTOLARYNGOLOGY

## 2025-06-23 PROCEDURE — 31231 NASAL ENDOSCOPY DX: CPT | Mod: 52 | Performed by: OTOLARYNGOLOGY

## 2025-06-23 PROCEDURE — 99213 OFFICE O/P EST LOW 20 MIN: CPT | Mod: 25 | Performed by: OTOLARYNGOLOGY

## 2025-06-23 PROCEDURE — 87102 FUNGUS ISOLATION CULTURE: CPT | Performed by: OTOLARYNGOLOGY

## 2025-06-23 PROCEDURE — 3078F DIAST BP <80 MM HG: CPT | Performed by: OTOLARYNGOLOGY

## 2025-06-23 PROCEDURE — 3074F SYST BP LT 130 MM HG: CPT | Performed by: OTOLARYNGOLOGY

## 2025-06-23 RX ORDER — LEVOTHYROXINE SODIUM 88 UG/1
88 TABLET ORAL DAILY
COMMUNITY
Start: 2025-05-10

## 2025-06-23 RX ORDER — CLINDAMYCIN HYDROCHLORIDE 300 MG/1
300 CAPSULE ORAL 4 TIMES DAILY
Qty: 40 CAPSULE | Refills: 5 | Status: SHIPPED | OUTPATIENT
Start: 2025-06-23 | End: 2025-07-03

## 2025-06-23 RX ORDER — BUDESONIDE 0.5 MG/2ML
INHALANT ORAL
Qty: 180 ML | Refills: 3 | Status: SHIPPED | OUTPATIENT
Start: 2025-06-23

## 2025-06-25 ENCOUNTER — RESULTS FOLLOW-UP (OUTPATIENT)
Dept: OTOLARYNGOLOGY | Facility: CLINIC | Age: 70
End: 2025-06-25

## 2025-06-25 LAB — BACTERIA SPEC CULT: NORMAL

## 2025-06-26 LAB — BACTERIA SPEC CULT: NORMAL

## 2025-07-03 LAB — BACTERIA SPEC CULT: NORMAL

## 2025-07-07 LAB — BACTERIA SPEC CULT: NO GROWTH
